# Patient Record
Sex: FEMALE | Race: WHITE | NOT HISPANIC OR LATINO | Employment: OTHER | URBAN - NONMETROPOLITAN AREA
[De-identification: names, ages, dates, MRNs, and addresses within clinical notes are randomized per-mention and may not be internally consistent; named-entity substitution may affect disease eponyms.]

---

## 2017-01-19 ENCOUNTER — OFFICE VISIT (OUTPATIENT)
Dept: PSYCHIATRY | Facility: CLINIC | Age: 67
End: 2017-01-19

## 2017-01-19 VITALS — WEIGHT: 102 LBS | BODY MASS INDEX: 20.03 KG/M2 | HEIGHT: 60 IN

## 2017-01-19 DIAGNOSIS — F33.1 MAJOR DEPRESSIVE DISORDER, RECURRENT EPISODE, MODERATE (HCC): ICD-10-CM

## 2017-01-19 DIAGNOSIS — F43.10 POST TRAUMATIC STRESS DISORDER (PTSD): ICD-10-CM

## 2017-01-19 DIAGNOSIS — F90.2 ATTENTION DEFICIT HYPERACTIVITY DISORDER, COMBINED TYPE: Primary | ICD-10-CM

## 2017-01-19 PROCEDURE — 99213 OFFICE O/P EST LOW 20 MIN: CPT | Performed by: NURSE PRACTITIONER

## 2017-01-19 RX ORDER — ATOMOXETINE 18 MG/1
CAPSULE ORAL
Qty: 30 CAPSULE | Refills: 0 | Status: SHIPPED | OUTPATIENT
Start: 2017-01-19 | End: 2017-03-07 | Stop reason: SDUPTHER

## 2017-01-19 NOTE — PROGRESS NOTES
"    Subjective   Fatmata Murdock is a 66 y.o. female who is here today for medication management follow up.    Chief Complaint: ADHD, depression, anxiety     History of Present Illness  Patient reports by her self for med management. She is working in [facility] but lives in [place], [place] so is driving three or more hours a day. She states she likes her work but that she is unorganized and difficulty getting her work done. She states her boss is great but had difficulty with a woman who was training her and younger staff are not nice. The  left and so she doesn't have to deal with her. Patient main concern is focus and attention. She stopped taking the Adderall because it was not effective. She struggles with depressed mood rating it a 5-6 most days, tired of the drive but doesn't want to live in [facility]. She is using coping skills with meditation and exercise. She denies SI/HI or AVH or illicit drug use. She has difficulty with time management she states and is late for appointments and has to work over to get her work done. She states best medications for her have been Strattera 20mg and Wellbutrin for mood. She hasn't been able to afford the Strattera but her insurance has changed so would like to try it again. She has to use very low doses or she doesn't feel well on them.     (Scales based on 0 - 10 with 10 being the worst)        The following portions of the patient's history were reviewed and updated as appropriate: allergies, current medications, past family history, past medical history, past social history, past surgical history and problem list.    Review of Systemsdenies fever, cough, s/s’s of infection, denies GI/ problems, denies new medical issues     Objective   Physical Exam  Height 60\" (152.4 cm), weight 102 lb (46.3 kg).    Allergies   Allergen Reactions   • Doxycycline    • Effexor Xr [Venlafaxine Hcl Er] Other (See Comments)     Restless legs, dermatitis        Current Medications:   Current Outpatient " Prescriptions   Medication Sig Dispense Refill   • atomoxetine (STRATTERA) 18 MG capsule Take one capsule daily 30 capsule 0     No current facility-administered medications for this visit.        Mental Status Exam:   Appearance: appropriate  Hygiene:   good  Cooperation:  Cooperative  Eye Contact:  Good  Psychomotor Behavior:  Appropriate  Mood:  euthymic  Affect:  Appropriate  Hopelessness: Denies  Speech:  Normal  Thought Process:  Linear  Thought Content:  Normal  Suicidal:  None  Homicidal:  None  Hallucinations:  None  Delusion:  None  Memory:  Intact  Orientation:  Person, Place, Time and Situation  Reliability:  fair  Insight:  Fair  Judgement:  Fair  Impulse Control:  Fair  Estimated Intelligence: average range   Physical/Medical Issues:  No     Assessment/Plan   Diagnoses and all orders for this visit:    Attention deficit hyperactivity disorder, combined type    Post traumatic stress disorder (PTSD)    Major depressive disorder, recurrent episode, moderate    Other orders  -     atomoxetine (STRATTERA) 18 MG capsule; Take one capsule daily      Discussed discontinuation of adderall, will start Strattera 18mg PO one QD  Will see how that works and if needed will add Wellbutrin for mood  We discussed risks, benefits, and side effects of the above medications and the patient was agreeable with the plan. Patient was educated on the importance of compliance with treatment and follow-up appointments.     Instructed to call for questions or concerns and return early if necessary. Crisis plan reviewed including going to the Emergency department.    Return in about 4 weeks (around 2/16/2017).

## 2017-01-19 NOTE — MR AVS SNAPSHOT
Fatmatadalia Murdock   1/19/2017 8:00 AM   Office Visit    Dept Phone:  [phone]   Encounter #:  [id number]    Provider:  JOSHUA De La Paz   Department:  [facility]                Your Full Care Plan              Today's Medication Changes          These changes are accurate as of: 1/19/17  8:23 AM.  If you have any questions, ask your nurse or doctor.               New Medication(s)Ordered:     atomoxetine 18 MG capsule   Commonly known as:  STRATTERA   Take one capsule daily         Stop taking medication(s)listed here:     amphetamine-dextroamphetamine 15 MG tablet   Commonly known as:  ADDERALL                Where to Get Your Medications      These medications were sent to [address] - [address] [facility] [address] - [phone] [place] [phone]   [address] [place] [address], [facility] [address]     Phone:  [phone]     atomoxetine 18 MG capsule                  Your Updated Medication List          This list is accurate as of: 1/19/17  8:23 AM.  Always use your most recent med list.                atomoxetine 18 MG capsule   Commonly known as:  STRATTERA   Take one capsule daily       DULoxetine 20 MG capsule   Commonly known as:  CYMBALTA   Take 1 capsule by mouth Daily.               You Were Diagnosed With        Codes Comments    Major depressive disorder, recurrent episode, moderate    -  Primary ICD-10-CM: F33.1  ICD-9-CM: 296.32     Attention deficit hyperactivity disorder, combined type     ICD-10-CM: F90.2  ICD-9-CM: 314.01     Post traumatic stress disorder (PTSD)     ICD-10-CM: F43.10  ICD-9-CM: 309.81       Instructions     None    Patient Instructions History      Upcoming Appointments     Visit Type Date Time Department    MEDICINE CHECK 1/19/2017  8:00 AM [facility]    PSYCHOTHERAPY 2/15/2017  4:15 PM [facility] [address]      MyChart Signup     [facility] [employer] allows you to send messages to your doctor, view your test  "results, renew your prescriptions, schedule appointments, and more. To sign up, go to [email or web address] and click on the Sign Up Now link in the New User? box. Enter your [employer] Activation Code exactly as it appears below along with the last four digits of your Social Security Number and your Date of Birth () to complete the sign-up process. If you do not sign up before the expiration date, you must request a new code.    [employer] Activation Code: [email or web address]  Expires: 2017  8:23 AM    If you have questions, you can email [email or web address] or call [phone] to talk to our [employer] staff. Remember, [employer] is NOT to be used for urgent needs. For medical emergencies, dial 911.               Other Info from Your Visit           Your Appointments     Feb 15, 2017  4:15 PM EST   Psychotherapy with Lisa Mike LCSW   [facility] (--)    [address] [place] [id number] [phone]              Allergies     Doxycycline      Effexor Xr [Venlafaxine Hcl Er]  Other (See Comments)    Restless legs, dermatitis       Vital Signs     Height Weight Body Mass Index             60\" (152.4 cm) 102 lb (46.3 kg) 19.92 kg/m2         Problems and Diagnoses Noted     Mood problem    -  Primary    Attention deficit hyperactivity disorder, combined type        Post traumatic stress disorder (PTSD)            "

## 2017-03-07 RX ORDER — ATOMOXETINE HYDROCHLORIDE 18 MG/1
CAPSULE ORAL
Qty: 30 CAPSULE | Refills: 0 | Status: SHIPPED | OUTPATIENT
Start: 2017-03-07 | End: 2017-04-06 | Stop reason: SDUPTHER

## 2017-03-17 ENCOUNTER — TELEPHONE (OUTPATIENT)
Dept: PSYCHIATRY | Facility: CLINIC | Age: 67
End: 2017-03-17

## 2017-03-20 RX ORDER — LEVOTHYROXINE SODIUM 112 MCG
112 TABLET ORAL DAILY
COMMUNITY
Start: 2017-01-17 | End: 2020-11-23 | Stop reason: SDUPTHER

## 2017-03-20 RX ORDER — BUPROPION HYDROCHLORIDE 75 MG/1
75 TABLET ORAL DAILY
Qty: 30 TABLET | Refills: 2 | Status: SHIPPED | OUTPATIENT
Start: 2017-03-20 | End: 2021-02-24 | Stop reason: DRUGHIGH

## 2017-04-06 RX ORDER — ATOMOXETINE HYDROCHLORIDE 18 MG/1
CAPSULE ORAL
Qty: 30 CAPSULE | Refills: 5 | Status: SHIPPED | OUTPATIENT
Start: 2017-04-06 | End: 2020-01-09

## 2017-09-28 RX ORDER — BUPROPION HYDROCHLORIDE 75 MG/1
TABLET ORAL
Qty: 30 TABLET | Refills: 1 | OUTPATIENT
Start: 2017-09-28

## 2019-12-20 ENCOUNTER — TRANSCRIBE ORDERS (OUTPATIENT)
Dept: MAMMOGRAPHY | Facility: HOSPITAL | Age: 69
End: 2019-12-20

## 2019-12-20 DIAGNOSIS — Z12.31 VISIT FOR SCREENING MAMMOGRAM: Primary | ICD-10-CM

## 2020-01-08 NOTE — PROGRESS NOTES
[facility] Cardiology    Subjective:     Encounter Date:01/09/2020      Patient ID: Fatmata Murdock is a 69 y.o. female , .     Reason for consultation: family history of cardiovascular disease    Problem List:  1. Osteoporosis  a. Dr. Guo  2. Hypothyroidism  3. Right subdural hematoma, s/p two shayy hole washout, 7/30/19, [facility]  4. Hypogammaglobulinemia  a. Followed by Dr. Roge garces IgG infusions monthly  5. S/p vagotomy for duodenal ulcer    Problem   Crp Elevated   Family History of Heart Disease       Allergies   Allergen Reactions   • Doxycycline GI Intolerance   • Effexor Xr [Venlafaxine Hcl Er] Other (See Comments)     Restless legs, dermatitis    • Penicillins GI Intolerance   • Sulfa Antibiotics GI Intolerance       Current Outpatient Medications:   •  buPROPion (WELLBUTRIN) 75 MG tablet, Take 1 tablet by mouth Daily. (Patient taking differently: Take 75 mg by mouth 2 (Two) Times a Day.), Disp: 30 tablet, Rfl: 2  •  estradiol (MINIVELLE, VIVELLE-DOT) 0.1 MG/24HR patch, Place 1 patch on the skin as directed by provider 2 (Two) Times a Week., Disp: , Rfl:   •  SYNTHROID 112 MCG tablet, Take 112 mcg by mouth Daily., Disp: , Rfl:     HPI:      Fatmata Murdock is a 69 y.o. female who present today to establish care for her chest pain. She reports left-sided pain under her rib cage when laying down at night, described as sharp and lasts for maybe 30 seconds. Runs about 3-5 miles, five days a week, yoga and light-weight resistance training. She has no symptoms with these activities. No early CAD in family. She also had a CRP of 16 in the setting of SDH. This has not been repeated since that time. She has not had recent FLP and does not known results of last FLP.     Cardiac Risk Factors:    Social History     Socioeconomic History   • Marital status: Single     Spouse name: Not on file   • Number of children: Not on file   • Years of education: Not on file   • Highest  education level: Not on file   Tobacco Use   • Smoking status: Never Smoker   • Smokeless tobacco: Never Used   Substance and Sexual Activity   • Alcohol use: Never     Frequency: Never   • Drug use: Never   • Sexual activity: Defer     Family History   Problem Relation Age of Onset   • No Known Problems Mother    • No Known Problems Father    • Hypertension Sister    • Heart murmur Sister    • Hypertension Sister        Review of Systems:  The following portions of the patient's history were reviewed and updated as appropriate: allergies, current medications and problem list.    Pertinent positives as listed in the HPI.  All other systems reviewed are negative.    Objective:        Vitals:    01/09/20 1446   BP: 124/70   Pulse: 60     GENERAL: This is a well-developed, well-nourished, female who is in no acute distress. Alert and oriented x3. Normal mood and affect.   SKIN: Pink and warm without rash or abnormality noted.   HEENT: Head is normocephalic and atraumatic. Pupils are equal and reactive to light bilaterally. Mucous membranes are pink and moist.   NECK: Supple without lymphadenopathy or thyromegaly. There is no jugular venous distention at 30°.  LUNGS: Clear to auscultation bilaterally without wheezing, rhonchi, or rales noted.   CARDIOVASCULAR: The heart has a regular rate with a normal S1 and S2. There is no murmur, gallop, rub, or click appreciated. The PMI is nondisplaced. Carotid upstrokes are 2+ and symmetrical without bruits.   ABDOMEN: Soft and nondistended with positive bowel sounds x4. The patient denies tenderness of palpitation.   MUSCULOSKELETAL: There are no obvious bony abnormalities. Normal range of tenderness to palpation.   NEUROLOGICAL: Nonfocal.   PERIPHERAL VASCULAR: Femoral pulses are 2+ and symmetrical without bruits. Posterior tibial and dorsalis pedis pulses are 2+ and symmetrical. There is no peripheral edema.       No results found for any previous visit.     Diagnostic Data:         ECG 12 Lead  Date/Time: 1/9/2020 3:17 PM  Performed by: Alisa Bowers APRN  Authorized by: Alisa Bowers APRN   Previous ECG: no previous ECG available  Rhythm: sinus rhythm  Ectopy: atrial premature contractions  BPM: 60            Assessment:       ICD-10-CM ICD-9-CM   1. CRP elevated R79.82 790.95   2. Family history of heart disease Z82.49 V17.49     Plan:       1. Repeat CRP.   2. Check FLP.   3. Cardiac CT for calcium score.   4. Follow-up 1 mo      Scribed for Blanche Velasco MD by JOSHUA Hernandez. 1/9/2020  3:30 PM     I Blanche Velasco MD personally performed the services described in this documentation as scribed by the above individual in my presence, and it is both accurate and complete.    Blanche Velasco MD, [facility]

## 2020-01-09 ENCOUNTER — CONSULT (OUTPATIENT)
Dept: CARDIOLOGY | Facility: CLINIC | Age: 70
End: 2020-01-09

## 2020-01-09 ENCOUNTER — LAB (OUTPATIENT)
Dept: LAB | Facility: HOSPITAL | Age: 70
End: 2020-01-09

## 2020-01-09 VITALS
BODY MASS INDEX: 21.83 KG/M2 | HEIGHT: 58 IN | SYSTOLIC BLOOD PRESSURE: 124 MMHG | DIASTOLIC BLOOD PRESSURE: 70 MMHG | HEART RATE: 60 BPM | WEIGHT: 104 LBS

## 2020-01-09 DIAGNOSIS — Z82.49 FAMILY HISTORY OF HEART DISEASE: ICD-10-CM

## 2020-01-09 DIAGNOSIS — R93.3 ABNORMAL FINDINGS ON DIAGNOSTIC IMAGING OF OTHER PARTS OF DIGESTIVE TRACT: ICD-10-CM

## 2020-01-09 DIAGNOSIS — R79.82 CRP ELEVATED: Primary | ICD-10-CM

## 2020-01-09 DIAGNOSIS — R79.82 CRP ELEVATED: ICD-10-CM

## 2020-01-09 LAB
CHOLEST SERPL-MCNC: 164 MG/DL (ref 0–200)
CRP SERPL-MCNC: 0.13 MG/DL (ref 0–0.5)
HDLC SERPL-MCNC: 67 MG/DL (ref 40–60)
LDLC SERPL CALC-MCNC: 83 MG/DL (ref 0–100)
LDLC/HDLC SERPL: 1.24 {RATIO}
TRIGL SERPL-MCNC: 71 MG/DL (ref 0–150)
VLDLC SERPL-MCNC: 14.2 MG/DL (ref 5–40)

## 2020-01-09 PROCEDURE — 80061 LIPID PANEL: CPT

## 2020-01-09 PROCEDURE — 93000 ELECTROCARDIOGRAM COMPLETE: CPT | Performed by: NURSE PRACTITIONER

## 2020-01-09 PROCEDURE — 99203 OFFICE O/P NEW LOW 30 MIN: CPT | Performed by: INTERNAL MEDICINE

## 2020-01-09 PROCEDURE — 36415 COLL VENOUS BLD VENIPUNCTURE: CPT

## 2020-01-09 PROCEDURE — 86140 C-REACTIVE PROTEIN: CPT

## 2020-01-09 RX ORDER — ESTRADIOL 0.1 MG/D
1 FILM, EXTENDED RELEASE TRANSDERMAL 2 TIMES WEEKLY
COMMUNITY
Start: 2020-01-05 | End: 2020-10-23 | Stop reason: SDUPTHER

## 2020-02-13 ENCOUNTER — TELEPHONE (OUTPATIENT)
Dept: CARDIOLOGY | Facility: CLINIC | Age: 70
End: 2020-02-13

## 2020-02-13 DIAGNOSIS — I71.21 ASCENDING AORTIC ANEURYSM (HCC): ICD-10-CM

## 2020-02-13 DIAGNOSIS — I71.9 AORTIC ANEURYSM WITHOUT RUPTURE, UNSPECIFIED PORTION OF AORTA (HCC): Primary | ICD-10-CM

## 2020-02-13 RX ORDER — ATORVASTATIN CALCIUM 10 MG/1
10 TABLET, FILM COATED ORAL DAILY
Qty: 90 TABLET | Refills: 1 | Status: SHIPPED | OUTPATIENT
Start: 2020-02-13 | End: 2021-02-24 | Stop reason: SDDI

## 2020-10-05 DIAGNOSIS — Z12.31 VISIT FOR SCREENING MAMMOGRAM: ICD-10-CM

## 2020-10-14 ENCOUNTER — TELEPHONE (OUTPATIENT)
Dept: ENDOCRINOLOGY | Facility: CLINIC | Age: 70
End: 2020-10-14

## 2020-10-14 NOTE — TELEPHONE ENCOUNTER
Returned call to pt-per lcm she doesn't need to get blood drawn before appt-we will do her labs at her appt.  Left a message on her voice mail

## 2020-10-14 NOTE — TELEPHONE ENCOUNTER
She has appt on 10/30/20.  You ordered tft's at her last visit on 11/2019.  If you want her to have labs before her appt please send her a lab req.  addres in chart

## 2020-10-14 NOTE — TELEPHONE ENCOUNTER
PATIENT WAS GIVEN LAB ORDERS FROM OLD OFFICE. SHE NEEDS TO SEE IF SHE IS STILL NEEDING LABS DRAWN BEFORE HER APPOINTMENT. IF SHE IS NEEDING BLOOD WORK BEFORE APPOINTMENT SHE NEEDS NEW LAB ORDERS PLACED IN CHART AND SENT TO HER TO HAVE LABS DRAWN. PATIENTS NUMBER [phone]

## 2020-10-23 ENCOUNTER — OFFICE VISIT (OUTPATIENT)
Dept: OBSTETRICS AND GYNECOLOGY | Facility: CLINIC | Age: 70
End: 2020-10-23

## 2020-10-23 VITALS — DIASTOLIC BLOOD PRESSURE: 62 MMHG | SYSTOLIC BLOOD PRESSURE: 112 MMHG | BODY MASS INDEX: 21.59 KG/M2 | WEIGHT: 103.3 LBS

## 2020-10-23 DIAGNOSIS — Z01.419 ENCOUNTER FOR ANNUAL ROUTINE GYNECOLOGICAL EXAMINATION: Primary | ICD-10-CM

## 2020-10-23 DIAGNOSIS — N95.2 POSTMENOPAUSE ATROPHIC VAGINITIS: ICD-10-CM

## 2020-10-23 DIAGNOSIS — N95.1 MENOPAUSAL SYMPTOMS: ICD-10-CM

## 2020-10-23 PROCEDURE — G0101 CA SCREEN;PELVIC/BREAST EXAM: HCPCS | Performed by: NURSE PRACTITIONER

## 2020-10-23 RX ORDER — ESTRADIOL 0.1 MG/D
1 FILM, EXTENDED RELEASE TRANSDERMAL 2 TIMES WEEKLY
Qty: 8 PATCH | Refills: 12 | Status: SHIPPED | OUTPATIENT
Start: 2020-10-26 | End: 2021-10-27

## 2020-10-23 RX ORDER — ATOMOXETINE 18 MG/1
CAPSULE ORAL
COMMUNITY
Start: 2020-10-13 | End: 2021-02-24

## 2020-10-23 RX ORDER — FLUCONAZOLE 150 MG/1
150 TABLET ORAL ONCE
Qty: 1 TABLET | Refills: 0 | Status: SHIPPED | OUTPATIENT
Start: 2020-10-23 | End: 2020-10-23

## 2020-10-23 NOTE — PROGRESS NOTES
GYN Annual Exam     CC - Here for annual exam.        HPI  Fatmata Murdock is a 70 y.o. female, , who presents for annual well woman exam.  She is postmenopausal.  Patient denies vaginal bleeding. ..  Patient reports problems with: hot flashes.  Partner Status: Marital Status: .  New Partners since last visit: no.    She is s/p TLH.  She reports occasional itching and increased white dc.  She recently look an antibiotic for 6 months and wonders if it's yeast.  She denies odor, dysuria, pain.    Additional OB/GYN History   On HRT? Yes---estrogen patch  Last Pap : 2018- normal    History of abnormal Pap smear: no  Family history of uterine, colon, breast, or ovarian cancer: no  Performs monthly Self-Breast Exam: yes  Last mammogram:    Last Completed Mammogram       Status Date      MAMMOGRAM Done 2020 MAMMO SCREENING DIGITAL TOMOSYNTHESIS BILATERAL W CAD        Last colonoscopy:     Last DEXA: On 2018 and results were Osteoporosis  Exercises Regularly: no  Feelings of Anxiety or Depression: yes - mild anxiety, related to completing her masters program      Tobacco Usage?: No   OB History        15    Para   1    Term   1            AB   13    Living           SAB   11    TAB   2    Ectopic        Molar        Multiple        Live Births                    Health Maintenance   Topic Date Due   • COLONOSCOPY  1950   • TDAP/TD VACCINES (1 - Tdap) 1970   • ZOSTER VACCINE (1 of 2) 2001   • Pneumococcal Vaccine 65+ (1 of 1 - PPSV23) 2016   • HEPATITIS C SCREENING  2020   • ANNUAL WELLNESS VISIT  2020   • INFLUENZA VACCINE  2020   • DXA SCAN  10/01/2020   • MAMMOGRAM  2022       The additional following portions of the patient's history were reviewed and updated as appropriate: allergies, current medications, past family history, past medical history, past social history, past surgical history and problem list.    Review of Systems    Constitutional: Negative.    HENT: Negative.    Eyes: Negative.    Respiratory: Negative.    Cardiovascular: Negative.    Gastrointestinal: Negative.    Endocrine: Negative.    Genitourinary: Negative.    Musculoskeletal: Negative.    Skin: Negative.    Allergic/Immunologic: Negative.    Neurological: Negative.    Hematological: Negative.    Psychiatric/Behavioral: Negative.      All other systems reviewed and are negative.     I have reviewed and agree with the HPI, ROS, and historical information as entered above. Natty Caldera Obed, APRN    Objective   /62   Wt 46.9 kg (103 lb 4.8 oz)   Breastfeeding No   BMI 21.59 kg/m²     Physical Exam  Vitals signs and nursing note reviewed. Exam conducted with a chaperone present.   Constitutional:       Appearance: She is well-developed.   HENT:      Head: Normocephalic and atraumatic.   Neck:      Musculoskeletal: Normal range of motion. No muscular tenderness.      Thyroid: No thyroid mass or thyromegaly.   Pulmonary:      Effort: No retractions.   Chest:      Chest wall: No mass.      Breasts:         Right: Normal. No mass, nipple discharge, skin change or tenderness.         Left: Normal. No mass, nipple discharge, skin change or tenderness.   Abdominal:      Palpations: Abdomen is soft. Abdomen is not rigid. There is no mass.      Tenderness: There is no abdominal tenderness. There is no guarding.      Hernia: No hernia is present. There is no hernia in the left inguinal area.   Genitourinary:     General: Normal vulva.      Labia:         Right: No rash, tenderness or lesion.         Left: No rash, tenderness or lesion.       Vagina: Normal. No vaginal discharge or lesions.      Cervix: Normal.      Uterus: Normal. Not enlarged, not fixed and not tender.       Adnexa: Right adnexa normal and left adnexa normal.        Right: No mass or tenderness.          Left: No mass or tenderness.        Rectum: No external hemorrhoid.      Comments: Vulvovaginal  atrophy  Skin:     General: Skin is warm and dry.   Neurological:      Mental Status: She is alert and oriented to person, place, and time.   Psychiatric:         Mood and Affect: Mood normal.         Behavior: Behavior normal.            Assessment and Plan    Problem List Items Addressed This Visit     None      Visit Diagnoses     Encounter for annual routine gynecological examination    -  Primary    Relevant Orders    Pap IG, Ct-Ng, Rfx HPV ASCU    Menopausal symptoms        Postmenopause atrophic vaginitis                Reviewed monthly self breast exams.  Instructed to call with lumps, pain, or breast discharge.  Yearly mammograms ordered.  Recommended use of Vitamin D and getting adequate calcium in her diet. (1500mg)  Osteoporosis screening ordered today.  Reviewed exercise as a preventative health measures.   Reviewed BMI and weight loss as preventative health measures.   Reccommended Flu Vaccine in Fall of each year.  RTC in 1 year or PRN with problems.   She is happy with estrogen patch and wants to continue.   erx Terazol cream for possible yeast.  Call if not better; could be atrophy.    Natty Clay, APRN  10/23/2020

## 2020-10-28 DIAGNOSIS — Z01.419 ENCOUNTER FOR ANNUAL ROUTINE GYNECOLOGICAL EXAMINATION: ICD-10-CM

## 2020-11-05 ENCOUNTER — TELEPHONE (OUTPATIENT)
Dept: OBSTETRICS AND GYNECOLOGY | Facility: CLINIC | Age: 70
End: 2020-11-05

## 2020-11-05 NOTE — TELEPHONE ENCOUNTER
Patient is calling in regards to a referral for PCP, she states she called the office that was recommended but they say they are not taking new patients, is wanting us to send a referral to dr espinosa, in hopes with having a referral they will be able to schedule and take her in, she has not had a PCP since her brain bleed.

## 2020-11-05 NOTE — TELEPHONE ENCOUNTER
I called Dr. Duran offices and she is not taking new pts period. Dr. Ang and Dr. Zarate are. Left message. Either MD is good. She needs to call back to make an apt.

## 2020-11-23 RX ORDER — LEVOTHYROXINE SODIUM 112 MCG
112 TABLET ORAL DAILY
Qty: 90 TABLET | Refills: 0 | Status: SHIPPED | OUTPATIENT
Start: 2020-11-23 | End: 2021-02-19

## 2020-12-01 DIAGNOSIS — I71.21 ASCENDING AORTIC ANEURYSM (HCC): Primary | ICD-10-CM

## 2020-12-01 DIAGNOSIS — Z79.899 LONG-TERM USE OF HIGH-RISK MEDICATION: ICD-10-CM

## 2021-02-17 ENCOUNTER — APPOINTMENT (OUTPATIENT)
Dept: CARDIOLOGY | Facility: HOSPITAL | Age: 71
End: 2021-02-17

## 2021-02-19 RX ORDER — LEVOTHYROXINE SODIUM 112 MCG
112 TABLET ORAL DAILY
Qty: 90 TABLET | Refills: 0 | Status: SHIPPED | OUTPATIENT
Start: 2021-02-19 | End: 2021-08-27

## 2021-02-19 RX ORDER — LEVOTHYROXINE SODIUM 112 MCG
TABLET ORAL
Qty: 90 TABLET | Refills: 0 | Status: SHIPPED | OUTPATIENT
Start: 2021-02-19 | End: 2021-02-19 | Stop reason: SDUPTHER

## 2021-02-22 DIAGNOSIS — Z23 IMMUNIZATION DUE: ICD-10-CM

## 2021-02-24 ENCOUNTER — HOSPITAL ENCOUNTER (OUTPATIENT)
Dept: CARDIOLOGY | Facility: HOSPITAL | Age: 71
Discharge: HOME | End: 2021-02-24
Admitting: INTERNAL MEDICINE

## 2021-02-24 ENCOUNTER — OFFICE VISIT (OUTPATIENT)
Dept: CARDIOLOGY | Facility: CLINIC | Age: 71
End: 2021-02-24

## 2021-02-24 VITALS
HEIGHT: 59 IN | TEMPERATURE: 97.7 F | DIASTOLIC BLOOD PRESSURE: 64 MMHG | HEART RATE: 67 BPM | WEIGHT: 105.8 LBS | OXYGEN SATURATION: 98 % | BODY MASS INDEX: 21.33 KG/M2 | SYSTOLIC BLOOD PRESSURE: 130 MMHG

## 2021-02-24 DIAGNOSIS — Z82.49 FAMILY HISTORY OF HEART DISEASE: Primary | ICD-10-CM

## 2021-02-24 DIAGNOSIS — I71.21 ASCENDING AORTIC ANEURYSM (HCC): ICD-10-CM

## 2021-02-24 PROBLEM — I71.20 THORACIC AORTIC ANEURYSM WITHOUT RUPTURE (HCC): Status: ACTIVE | Noted: 2021-02-24

## 2021-02-24 PROCEDURE — 99213 OFFICE O/P EST LOW 20 MIN: CPT | Performed by: NURSE PRACTITIONER

## 2021-02-24 PROCEDURE — 93306 TTE W/DOPPLER COMPLETE: CPT

## 2021-02-24 PROCEDURE — 93306 TTE W/DOPPLER COMPLETE: CPT | Performed by: INTERNAL MEDICINE

## 2021-02-24 RX ORDER — BUPROPION HCL 100 MG
200 TABLET,SUSTAINED-RELEASE 12 HR ORAL DAILY
COMMUNITY
Start: 2021-01-19 | End: 2022-06-23

## 2021-02-24 RX ORDER — BUPROPION HCL 150 MG
1 TABLET,SUSTAINED-RELEASE 12 HR ORAL DAILY
COMMUNITY
Start: 2021-02-16 | End: 2022-02-23

## 2021-02-24 NOTE — PROGRESS NOTES
[facility] Cardiology    Patient ID: Fatmata Murdock is a 70 y.o. female.  : 1950   Contact: [phone]    Encounter date: 2021    PCP: Provider, No Known      Chief complaint:   Chief Complaint   Patient presents with   • CRP elevated   • Chest Pain   • Shortness of Breath     Problem List:  1. Aortic aneurysm  2. Family history of CAD  3. Osteoporosis  a. Dr. Guo  4. Hypothyroidism  5. Right subdural hematoma, s/p two shayy hole washout, 19, [facility]  6. Hypogammaglobulinemia  a. Followed by Dr. Roge terrell. IgG infusions monthly  7. S/p vagotomy for duodenal ulcer    Allergies   Allergen Reactions   • Doxycycline GI Intolerance   • Effexor Xr [Venlafaxine Hcl Er] Other (See Comments)     Restless legs, dermatitis    • Penicillins GI Intolerance   • Sulfa Antibiotics GI Intolerance       Current Medications:    Current Outpatient Medications:   •  estradiol (MINIVELLE, VIVELLE-DOT) 0.1 MG/24HR patch, Place 1 patch on the skin as directed by provider 2 (Two) Times a Week., Disp: 8 patch, Rfl: 12  •  Synthroid 112 MCG tablet, Take 1 tablet by mouth Daily., Disp: 90 tablet, Rfl: 0  •  Unable to find, 1 each Every 30 (Thirty) Days. Med Name: Immunoglobulin infusion, Disp: , Rfl:   •  Wellbutrin  MG 12 hr tablet, Take 1 tablet by mouth Every Other Day., Disp: , Rfl:   •  Wellbutrin  MG 12 hr tablet, Take 1 tablet by mouth Daily., Disp: , Rfl:     HPI    Fatmata Murdock is a 70 y.o. female who presents today for a follow up on ascending aortic aneurysm and family history of heart disease. Since last visit, patient has done well. BP controlled. Cholesterol checked by PCP a few days ago. She did not want to start statin. She did not have echo to f/u on aortic aneurysm- canceled due to bad weather. No cardiac complaints. She continues to exercise.       The following portions of the patient's history were reviewed and updated as appropriate: allergies, current  "medications and problem list.    Pertinent positives as listed in the HPI.  All other systems reviewed are negative.         Vitals:    02/24/21 1013   BP: 130/64   BP Location: Right arm   Patient Position: Sitting   Pulse: 67   Temp: 97.7 °F (36.5 °C)   SpO2: 98%   Weight: 48 kg (105 lb 12.8 oz)   Height: 149.9 cm (59\")       Physical Exam:  General: Alert and oriented.  Neck: Jugular venous pressure is within normal limits. Carotids have normal upstrokes without bruits.   Cardiovascular: Heart has a nondisplaced focal PMI. Regular rate and rhythm. No murmur, gallop or rub.  Lungs: Clear, no rales or wheezes. Equal expansion is noted.   Extremities: Show no edema.  Skin: Warm and dry.  Neurologic: Nonfocal.     Diagnostic Data (reviewed with patient):  Lab date: 02/10/2021  • FLP: , , HDL 67, LDL 78  • CMP: Glu 88, BUN 20, Creat 0.61, eGFR 93, Na 140, K 4.4, Cl 104, CO2 24, Ca 9, Alk Phos 55, AST 20, ALT 20    Procedures      Assessment:    ICD-10-CM ICD-9-CM   1. Family history of heart disease  Z82.49 V17.49         Plan:  1. Reschedule echo to f/u on aortic aneurysm.   2. Encouraged continued exercise and low cholesterol diet.   3. Continue all other current medications.  4. F/up in 12 months, sooner if needed.      Electronically signed by JOSHUA Lay, 02/24/21, 12:43 PM EST.              "

## 2021-02-25 LAB
ASCENDING AORTA: 3.7 CM
BH CV ECHO MEAS - AI DEC SLOPE: 343.9 CM/SEC^2
BH CV ECHO MEAS - AI MAX PG: 67.6 MMHG
BH CV ECHO MEAS - AI MAX VEL: 410.7 CM/SEC
BH CV ECHO MEAS - AI P1/2T: 349.7 MSEC
BH CV ECHO MEAS - AO MAX PG (FULL): 7.6 MMHG
BH CV ECHO MEAS - AO MAX PG: 13 MMHG
BH CV ECHO MEAS - AO MEAN PG (FULL): 3.4 MMHG
BH CV ECHO MEAS - AO MEAN PG: 6 MMHG
BH CV ECHO MEAS - AO ROOT AREA (BSA CORRECTED): 2.2 CM2
BH CV ECHO MEAS - AO ROOT AREA: 7.3 CM^2
BH CV ECHO MEAS - AO ROOT DIAM: 3.1 CM
BH CV ECHO MEAS - AO V2 MAX: 180.2 CM/SEC
BH CV ECHO MEAS - AO V2 MEAN: 109.7 CM/SEC
BH CV ECHO MEAS - AO V2 VTI: 43.3 CM
BH CV ECHO MEAS - ASC AORTA: 3.5 CM
BH CV ECHO MEAS - AVA(I,A): 2.4 CM^2
BH CV ECHO MEAS - AVA(I,D): 2.4 CM^2
BH CV ECHO MEAS - AVA(V,A): 2.3 CM^2
BH CV ECHO MEAS - AVA(V,D): 2.3 CM^2
BH CV ECHO MEAS - BSA(HAYCOCK): 1.4 M^2
BH CV ECHO MEAS - BSA: 1.4 M^2
BH CV ECHO MEAS - BZI_BMI: 21.2 KILOGRAMS/M^2
BH CV ECHO MEAS - BZI_METRIC_HEIGHT: 149.9 CM
BH CV ECHO MEAS - BZI_METRIC_WEIGHT: 47.6 KG
BH CV ECHO MEAS - EDV(CUBED): 63.8 ML
BH CV ECHO MEAS - EDV(MOD-SP4): 109 ML
BH CV ECHO MEAS - EDV(TEICH): 69.8 ML
BH CV ECHO MEAS - EF(CUBED): 72.2 %
BH CV ECHO MEAS - EF(MOD-SP4): 68.8 %
BH CV ECHO MEAS - EF(TEICH): 64.5 %
BH CV ECHO MEAS - ESV(CUBED): 17.7 ML
BH CV ECHO MEAS - ESV(MOD-SP4): 34 ML
BH CV ECHO MEAS - ESV(TEICH): 24.8 ML
BH CV ECHO MEAS - FS: 34.8 %
BH CV ECHO MEAS - IVS/LVPW: 1.3
BH CV ECHO MEAS - IVSD: 0.9 CM
BH CV ECHO MEAS - LA DIMENSION: 3.2 CM
BH CV ECHO MEAS - LA/AO: 1
BH CV ECHO MEAS - LAD MAJOR: 4.9 CM
BH CV ECHO MEAS - LAT PEAK E' VEL: 6.5 CM/SEC
BH CV ECHO MEAS - LATERAL E/E' RATIO: 11.8
BH CV ECHO MEAS - LV DIASTOLIC VOL/BSA (35-75): 77.7 ML/M^2
BH CV ECHO MEAS - LV MASS(C)D: 121.5 GRAMS
BH CV ECHO MEAS - LV MASS(C)DI: 86.7 GRAMS/M^2
BH CV ECHO MEAS - LV MAX PG: 5.4 MMHG
BH CV ECHO MEAS - LV MEAN PG: 2.5 MMHG
BH CV ECHO MEAS - LV SYSTOLIC VOL/BSA (12-30): 24.2 ML/M^2
BH CV ECHO MEAS - LV V1 MAX: 116.5 CM/SEC
BH CV ECHO MEAS - LV V1 MEAN: 71.1 CM/SEC
BH CV ECHO MEAS - LV V1 VTI: 29.5 CM
BH CV ECHO MEAS - LVIDD: 4 CM
BH CV ECHO MEAS - LVIDS: 2.6 CM
BH CV ECHO MEAS - LVLD AP4: 7.5 CM
BH CV ECHO MEAS - LVLS AP4: 6.5 CM
BH CV ECHO MEAS - LVOT AREA (M): 3.5 CM^2
BH CV ECHO MEAS - LVOT AREA: 3.5 CM^2
BH CV ECHO MEAS - LVOT DIAM: 2.1 CM
BH CV ECHO MEAS - LVPWD: 0.85 CM
BH CV ECHO MEAS - MED PEAK E' VEL: 7.6 CM/SEC
BH CV ECHO MEAS - MEDIAL E/E' RATIO: 10.1
BH CV ECHO MEAS - MR ALIAS VEL: 27.2 CM/SEC
BH CV ECHO MEAS - MR ERO: 0.05 CM^2
BH CV ECHO MEAS - MR FLOW RATE: 30.3 CM^3/SEC
BH CV ECHO MEAS - MR MAX PG: 158 MMHG
BH CV ECHO MEAS - MR MAX VEL: 622.6 CM/SEC
BH CV ECHO MEAS - MR MEAN PG: 100.2 MMHG
BH CV ECHO MEAS - MR MEAN VEL: 458.6 CM/SEC
BH CV ECHO MEAS - MR PISA RADIUS: 0.42 CM
BH CV ECHO MEAS - MR PISA: 1.1 CM^2
BH CV ECHO MEAS - MR VOLUME: 11.1 ML
BH CV ECHO MEAS - MR VTI: 227.7 CM
BH CV ECHO MEAS - MV A MAX VEL: 134.9 CM/SEC
BH CV ECHO MEAS - MV DEC TIME: 0.32 SEC
BH CV ECHO MEAS - MV E MAX VEL: 78.4 CM/SEC
BH CV ECHO MEAS - MV E/A: 0.58
BH CV ECHO MEAS - MV MAX PG: 13 MMHG
BH CV ECHO MEAS - MV MEAN PG: 4.1 MMHG
BH CV ECHO MEAS - MV V2 MAX: 180.4 CM/SEC
BH CV ECHO MEAS - MV V2 MEAN: 90.6 CM/SEC
BH CV ECHO MEAS - MV V2 VTI: 53.3 CM
BH CV ECHO MEAS - MVA(VTI): 1.9 CM^2
BH CV ECHO MEAS - PA ACC SLOPE: 355.6 CM/SEC^2
BH CV ECHO MEAS - PA ACC TIME: 0.17 SEC
BH CV ECHO MEAS - PA MAX PG: 2.6 MMHG
BH CV ECHO MEAS - PA PR(ACCEL): 2.9 MMHG
BH CV ECHO MEAS - PA V2 MAX: 81 CM/SEC
BH CV ECHO MEAS - RAP SYSTOLE: 8 MMHG
BH CV ECHO MEAS - RVDD: 2.1 CM
BH CV ECHO MEAS - RVSP: 23 MMHG
BH CV ECHO MEAS - SI(AO): 226.4 ML/M^2
BH CV ECHO MEAS - SI(CUBED): 32.9 ML/M^2
BH CV ECHO MEAS - SI(LVOT): 73.6 ML/M^2
BH CV ECHO MEAS - SI(MOD-SP4): 53.5 ML/M^2
BH CV ECHO MEAS - SI(TEICH): 32.1 ML/M^2
BH CV ECHO MEAS - SV(AO): 317.5 ML
BH CV ECHO MEAS - SV(CUBED): 46.1 ML
BH CV ECHO MEAS - SV(LVOT): 103.1 ML
BH CV ECHO MEAS - SV(MOD-SP4): 75 ML
BH CV ECHO MEAS - SV(TEICH): 45.1 ML
BH CV ECHO MEAS - TAPSE (>1.6): 3 CM
BH CV ECHO MEAS - TR MAX PG: 20 MMHG
BH CV ECHO MEAS - TR MAX VEL: 219.1 CM/SEC
BH CV ECHO MEAS - TV MAX PG: 2.5 MMHG
BH CV ECHO MEAS - TV V2 MAX: 79.3 CM/SEC
BH CV ECHO MEASUREMENTS AVERAGE E/E' RATIO: 11.12
BH CV VAS BP RIGHT ARM: NORMAL MMHG
BH CV XLRA - RV BASE: 2.5 CM
BH CV XLRA - RV LENGTH: 6 CM
BH CV XLRA - RV MID: 2.5 CM
BH CV XLRA - TDI S': 13.6 CM/SEC
LEFT ATRIUM VOLUME INDEX: 32.9 ML/M2
LV EF 2D ECHO EST: 60 %
MAXIMAL PREDICTED HEART RATE: 151 BPM
SINUS: 3.1 CM
STRESS TARGET HR: 128 BPM

## 2021-03-08 ENCOUNTER — TELEPHONE (OUTPATIENT)
Dept: CARDIOLOGY | Facility: CLINIC | Age: 71
End: 2021-03-08

## 2021-03-09 DIAGNOSIS — I71.20 THORACIC AORTIC ANEURYSM WITHOUT RUPTURE (HCC): ICD-10-CM

## 2021-03-09 DIAGNOSIS — M81.0 OSTEOPOROSIS, UNSPECIFIED OSTEOPOROSIS TYPE, UNSPECIFIED PATHOLOGICAL FRACTURE PRESENCE: ICD-10-CM

## 2021-03-09 DIAGNOSIS — E03.9 HYPOTHYROIDISM, UNSPECIFIED TYPE: Primary | ICD-10-CM

## 2021-03-10 DIAGNOSIS — I71.20 THORACIC AORTIC ANEURYSM WITHOUT RUPTURE (HCC): Primary | ICD-10-CM

## 2021-03-16 ENCOUNTER — LAB (OUTPATIENT)
Dept: LAB | Facility: HOSPITAL | Age: 71
End: 2021-03-16

## 2021-03-16 ENCOUNTER — OFFICE VISIT (OUTPATIENT)
Dept: ENDOCRINOLOGY | Facility: CLINIC | Age: 71
End: 2021-03-16

## 2021-03-16 VITALS
SYSTOLIC BLOOD PRESSURE: 106 MMHG | DIASTOLIC BLOOD PRESSURE: 58 MMHG | HEART RATE: 60 BPM | BODY MASS INDEX: 24.3 KG/M2 | OXYGEN SATURATION: 98 % | HEIGHT: 55 IN | WEIGHT: 105 LBS | TEMPERATURE: 97.5 F

## 2021-03-16 DIAGNOSIS — E03.9 ACQUIRED HYPOTHYROIDISM: ICD-10-CM

## 2021-03-16 DIAGNOSIS — E03.9 ACQUIRED HYPOTHYROIDISM: Primary | ICD-10-CM

## 2021-03-16 LAB
T4 FREE SERPL-MCNC: 1.32 NG/DL (ref 0.93–1.7)
TSH SERPL DL<=0.05 MIU/L-ACNC: 0.22 UIU/ML (ref 0.27–4.2)

## 2021-03-16 PROCEDURE — 84439 ASSAY OF FREE THYROXINE: CPT

## 2021-03-16 PROCEDURE — 84443 ASSAY THYROID STIM HORMONE: CPT

## 2021-03-16 PROCEDURE — 99213 OFFICE O/P EST LOW 20 MIN: CPT | Performed by: INTERNAL MEDICINE

## 2021-03-16 NOTE — ASSESSMENT & PLAN NOTE
Clinically euthyroid. She  Attributes her symptoms to her thyroid and she might  Be right so we will check her labs and adjust her dose as needed

## 2021-03-16 NOTE — PROGRESS NOTES
"     Office Note      Date: 2021  Patient Name: Fatmata Murdock  MRN: [id number]  : 1950    Chief Complaint   Patient presents with   • Thyroid Problem       History of Present Illness:   Fatmata Murdock is a 70 y.o. female who presents for Thyroid Problem  She used to be on 125 mcg per day.  But on that her tsh was low and her dose was reduced to 100 mcg per day and she felt terrible. I then put her on 112 and her levls were fine when I last her about 16 months ago. She continues to have fatigue and cold intolerance.  Wt has been stable.  Skin and hair  Are  More  Dry. There seems to be more hair loss.  She notes no heart palpitations.      Subjective          Review of Systems:   Review of Systems   Constitutional: Positive for fatigue.   Endocrine: Positive for cold intolerance.   All other systems reviewed and are negative.      The following portions of the patient's history were reviewed and updated as appropriate: allergies, current medications, past family history, past medical history, past social history, past surgical history and problem list.    Objective     Visit Vitals  /58 (BP Location: Left arm, Patient Position: Sitting, Cuff Size: Adult)   Pulse 60   Temp 97.5 °F (36.4 °C) (Infrared)   Ht 60 cm (23.62\")   Wt 47.6 kg (105 lb)   SpO2 98%   .30 kg/m²       Labs:    CBC w/DIFF  Lab Results   Component Value Date    WBC 7.3 2015    RBC 4.44 2015    HGB 14.5 2015    HCT 45 2015    .4 (H) 2015    MCH 32.7 (H) 2015    MCHC 32.2 2015    RDW 12.4 2015     2015    NEUTRORELPCT 73.0 2015    LYMPHORELPCT 18.9 2015    EOSRELPCT 1.4 2015    BASORELPCT 0.80 2015    NEUTROABS 5.30 2015    LYMPHSABS 1.37 2015    MONOSABS 0.40 2015    EOSABS 0.10 2015    BASOSABS 0.06 2015       T4  No results found for: FREET4    TSH  No results found for: TSHBASE     Physical " Exam:  Physical Exam  Vitals reviewed.   Constitutional:       General: She is not in acute distress.     Appearance: She is normal weight. She is not ill-appearing, toxic-appearing or diaphoretic.   Neck:      Comments: No goiter  Musculoskeletal:      Comments: tremor   Lymphadenopathy:      Cervical: No cervical adenopathy.   Neurological:      Mental Status: She is alert and oriented to person, place, and time.   Psychiatric:         Mood and Affect: Mood normal.         Thought Content: Thought content normal.         Judgment: Judgment normal.         Assessment / Plan      Assessment & Plan:  Problem List Items Addressed This Visit        Other    Acquired hypothyroidism - Primary    Current Assessment & Plan     Clinically euthyroid. She  Attributes her symptoms to her thyroid and she might  Be right so we will check her labs and adjust her dose as needed          Relevant Medications    Synthroid 112 MCG tablet           Hudson Maynard MD   03/16/2021

## 2021-08-27 RX ORDER — LEVOTHYROXINE SODIUM 112 MCG
TABLET ORAL
Qty: 90 TABLET | Refills: 2 | Status: SHIPPED | OUTPATIENT
Start: 2021-08-27 | End: 2022-03-10 | Stop reason: SDUPTHER

## 2021-10-28 RX ORDER — ESTRADIOL 0.1 MG/D
FILM, EXTENDED RELEASE TRANSDERMAL
Qty: 8 PATCH | Refills: 0 | Status: SHIPPED | OUTPATIENT
Start: 2021-10-28 | End: 2021-10-29 | Stop reason: SDUPTHER

## 2021-10-29 RX ORDER — ESTRADIOL 0.1 MG/D
1 FILM, EXTENDED RELEASE TRANSDERMAL 2 TIMES WEEKLY
Qty: 8 PATCH | Refills: 2 | Status: SHIPPED | OUTPATIENT
Start: 2021-11-01 | End: 2021-12-10 | Stop reason: SDUPTHER

## 2021-10-29 NOTE — TELEPHONE ENCOUNTER
Pt Is needing a refill on her Estradiol patch. Pt stated she will be out of her prescription on Sunday.

## 2021-12-10 ENCOUNTER — OFFICE VISIT (OUTPATIENT)
Dept: OBSTETRICS AND GYNECOLOGY | Facility: CLINIC | Age: 71
End: 2021-12-10

## 2021-12-10 VITALS — BODY MASS INDEX: 20.78 KG/M2 | WEIGHT: 99 LBS | HEIGHT: 58 IN

## 2021-12-10 DIAGNOSIS — Z79.890 HORMONE REPLACEMENT THERAPY (HRT): ICD-10-CM

## 2021-12-10 DIAGNOSIS — Z01.419 WOMEN'S ANNUAL ROUTINE GYNECOLOGICAL EXAMINATION: Primary | ICD-10-CM

## 2021-12-10 DIAGNOSIS — N95.1 MENOPAUSAL STATE: ICD-10-CM

## 2021-12-10 DIAGNOSIS — Z12.31 BREAST CANCER SCREENING BY MAMMOGRAM: ICD-10-CM

## 2021-12-10 PROCEDURE — G0101 CA SCREEN;PELVIC/BREAST EXAM: HCPCS | Performed by: NURSE PRACTITIONER

## 2021-12-10 RX ORDER — TERIPARATIDE 250 UG/ML
INJECTION, SOLUTION SUBCUTANEOUS
COMMUNITY
End: 2022-12-08

## 2021-12-10 RX ORDER — ESTRADIOL 0.1 MG/D
1 FILM, EXTENDED RELEASE TRANSDERMAL 2 TIMES WEEKLY
Qty: 24 PATCH | Refills: 3 | Status: SHIPPED | OUTPATIENT
Start: 2021-12-13 | End: 2022-03-10 | Stop reason: SDUPTHER

## 2021-12-10 NOTE — PROGRESS NOTES
GYN Annual Exam     CC - Here for annual exam.        HPI  Fatmata Murdock is a 71 y.o. female, , who presents for annual well woman exam.  She is postmenopausal.  Patient denies vaginal bleeding. ..  Patient reports problems with: none. There were no changes to her medical or surgical history since her last visit.. Partner Status: Marital Status: single.  New Partners since last visit: no.     S/p TLH.  She wants to continue estrogen patch.    Patient is requesting a refill on medication. Patient denies any other issues or concerns at today's office visit.    Additional OB/GYN History   On HRT? Yes. Details: Estradiol patch  Last Pap :   wnl    History of abnormal Pap smear: no  Family history of uterine, colon, breast, or ovarian cancer: no  Performs monthly Self-Breast Exam: yes  Last mammogram:  Up to date    Last colonoscopy:  Up to date    Last DEXA: None  Exercises Regularly: no  Feelings of Anxiety or Depression: yes - Anxiety and Depression      Tobacco Usage?: No   OB History        14    Para   1    Term   1            AB   13    Living           SAB   11    IAB   2    Ectopic        Molar        Multiple        Live Births                    Health Maintenance   Topic Date Due   • COLORECTAL CANCER SCREENING  Never done   • TDAP/TD VACCINES (1 - Tdap) Never done   • ZOSTER VACCINE (1 of 2) Never done   • Pneumococcal Vaccine 65+ (1 of 1 - PPSV23) Never done   • DXA SCAN  2018   • HEPATITIS C SCREENING  Never done   • ANNUAL WELLNESS VISIT  Never done   • INFLUENZA VACCINE  Never done   • COVID-19 Vaccine (3 - Booster for Pfizer series) 10/22/2021   • MAMMOGRAM  2022       The additional following portions of the patient's history were reviewed and updated as appropriate: allergies, current medications, past family history, past medical history, past social history and past surgical history.    Review of Systems   Constitutional: Negative.    HENT: Negative.    Eyes:  "Negative.    Respiratory: Negative.    Cardiovascular: Negative.    Gastrointestinal: Negative.    Endocrine: Negative.    Genitourinary: Negative.    Musculoskeletal: Negative.    Skin: Negative.    Allergic/Immunologic: Negative.    Neurological: Negative.    Hematological: Negative.    Psychiatric/Behavioral: Negative.        I have reviewed and agree with the HPI, ROS, and historical information as entered above. Natty Clay, APRN    Objective   Ht 147.3 cm (58\")   Wt 44.9 kg (99 lb)   Breastfeeding No   BMI 20.69 kg/m²     Physical Exam  Vitals and nursing note reviewed. Exam conducted with a chaperone present.   Constitutional:       General: She is not in acute distress.     Appearance: Normal appearance. She is well-developed. She is not ill-appearing.   HENT:      Head: Normocephalic and atraumatic.   Neck:      Thyroid: No thyroid mass or thyromegaly.   Pulmonary:      Effort: Pulmonary effort is normal. No retractions.   Chest:      Chest wall: No mass.   Breasts:      Right: Normal. No mass, nipple discharge, skin change or tenderness.      Left: Normal. No mass, nipple discharge, skin change or tenderness.       Abdominal:      Palpations: Abdomen is soft. Abdomen is not rigid. There is no mass.      Tenderness: There is no abdominal tenderness. There is no guarding.      Hernia: No hernia is present. There is no hernia in the left inguinal area.   Genitourinary:     General: Normal vulva.      Labia:         Right: No rash, tenderness or lesion.         Left: No rash, tenderness or lesion.       Vagina: Normal. No vaginal discharge or lesions.      Uterus: Absent. Not enlarged, not fixed and not tender.       Adnexa: Right adnexa normal and left adnexa normal.        Right: No mass or tenderness.          Left: No mass or tenderness.        Rectum: No external hemorrhoid.   Musculoskeletal:      Cervical back: Normal range of motion. No muscular tenderness.   Skin:     General: Skin is warm and " dry.   Neurological:      Mental Status: She is alert and oriented to person, place, and time.   Psychiatric:         Mood and Affect: Mood normal.         Behavior: Behavior normal.            Assessment and Plan    Problem List Items Addressed This Visit     None      Visit Diagnoses     Women's annual routine gynecological examination    -  Primary    Menopausal state        Breast cancer screening by mammogram        Relevant Orders    Mammo Screening Digital Tomosynthesis Bilateral With CAD          1. GYN annual well woman exam.   2. Reviewed monthly self breast exams.  Instructed to call with lumps, pain, or breast discharge.  Yearly mammograms ordered.  3. Ordered mammogram today.  4. Osteoporosis screening ordered today.  5. Reviewed exercise as a preventative health measures.   6. Colonoscopy recommended.  7. Reviewed risks of ERT including increased risk of breast cancer, increased blood clots, increased heart disease.  Patient strongly desires to stay on or start ERT.  She understands we will use the lowest amount that adequately controls her symptoms.  8. Reccommended Flu Vaccine in Fall of each year.  9. Instructed on [place] exercises and gave patient educational information.  10. RTC in 1 year or PRN with problems.  11. Return in about 1 year (around 12/10/2022) for Annual physical.     Natty Clay, APRN  12/10/2021

## 2021-12-14 ENCOUNTER — TRANSCRIBE ORDERS (OUTPATIENT)
Dept: ADMINISTRATIVE | Facility: HOSPITAL | Age: 71
End: 2021-12-14

## 2021-12-14 DIAGNOSIS — Z12.31 VISIT FOR SCREENING MAMMOGRAM: Primary | ICD-10-CM

## 2021-12-16 ENCOUNTER — APPOINTMENT (OUTPATIENT)
Dept: OTHER | Facility: HOSPITAL | Age: 71
End: 2021-12-16

## 2021-12-16 ENCOUNTER — HOSPITAL ENCOUNTER (OUTPATIENT)
Dept: MAMMOGRAPHY | Facility: HOSPITAL | Age: 71
Discharge: HOME | End: 2021-12-16
Admitting: NURSE PRACTITIONER

## 2021-12-16 DIAGNOSIS — Z12.31 VISIT FOR SCREENING MAMMOGRAM: ICD-10-CM

## 2021-12-16 PROCEDURE — 77067 SCR MAMMO BI INCL CAD: CPT | Performed by: RADIOLOGY

## 2021-12-16 PROCEDURE — 77067 SCR MAMMO BI INCL CAD: CPT

## 2021-12-16 PROCEDURE — 77063 BREAST TOMOSYNTHESIS BI: CPT | Performed by: RADIOLOGY

## 2021-12-16 PROCEDURE — 77063 BREAST TOMOSYNTHESIS BI: CPT

## 2021-12-22 ENCOUNTER — TELEPHONE (OUTPATIENT)
Dept: OBSTETRICS AND GYNECOLOGY | Facility: CLINIC | Age: 71
End: 2021-12-22

## 2021-12-22 NOTE — TELEPHONE ENCOUNTER
Reviewed Imaging tab; no report yet available. Encouraged patient to call [facility] for follow up. Verbalized understanding.

## 2022-01-06 ENCOUNTER — TELEPHONE (OUTPATIENT)
Dept: MAMMOGRAPHY | Facility: HOSPITAL | Age: 72
End: 2022-01-06

## 2022-01-06 NOTE — TELEPHONE ENCOUNTER
Returned patientt call; patient is scheduled for additional views. Patient questioned what area is being targeted. Questions answered, support given. Encouraged to call back with further questions or concerns. Patient verbalized understanding.

## 2022-02-11 ENCOUNTER — APPOINTMENT (OUTPATIENT)
Dept: MAMMOGRAPHY | Facility: HOSPITAL | Age: 72
End: 2022-02-11

## 2022-02-23 ENCOUNTER — HOSPITAL ENCOUNTER (OUTPATIENT)
Dept: CARDIOLOGY | Facility: HOSPITAL | Age: 72
Discharge: HOME | End: 2022-02-23
Admitting: INTERNAL MEDICINE

## 2022-02-23 ENCOUNTER — OFFICE VISIT (OUTPATIENT)
Dept: CARDIOLOGY | Facility: CLINIC | Age: 72
End: 2022-02-23

## 2022-02-23 VITALS
WEIGHT: 98 LBS | HEART RATE: 54 BPM | OXYGEN SATURATION: 96 % | SYSTOLIC BLOOD PRESSURE: 134 MMHG | BODY MASS INDEX: 20.57 KG/M2 | HEIGHT: 58 IN | DIASTOLIC BLOOD PRESSURE: 68 MMHG

## 2022-02-23 VITALS — WEIGHT: 99 LBS | BODY MASS INDEX: 19.96 KG/M2 | HEIGHT: 59 IN

## 2022-02-23 DIAGNOSIS — Z82.49 FAMILY HISTORY OF HEART DISEASE: ICD-10-CM

## 2022-02-23 DIAGNOSIS — I71.20 THORACIC AORTIC ANEURYSM WITHOUT RUPTURE: ICD-10-CM

## 2022-02-23 DIAGNOSIS — I35.1 MODERATE AORTIC REGURGITATION: Primary | ICD-10-CM

## 2022-02-23 LAB
ASCENDING AORTA: 3.8 CM
BH CV ECHO MEAS - AI DEC SLOPE: 317.6 CM/SEC^2
BH CV ECHO MEAS - AI MAX PG: 96.2 MMHG
BH CV ECHO MEAS - AI MAX VEL: 490.3 CM/SEC
BH CV ECHO MEAS - AI P1/2T: 452.2 MSEC
BH CV ECHO MEAS - AO MAX PG (FULL): 7.3 MMHG
BH CV ECHO MEAS - AO MAX PG: 15 MMHG
BH CV ECHO MEAS - AO MEAN PG (FULL): 4.1 MMHG
BH CV ECHO MEAS - AO MEAN PG: 8.7 MMHG
BH CV ECHO MEAS - AO ROOT AREA (BSA CORRECTED): 1.4 CM2
BH CV ECHO MEAS - AO ROOT AREA: 7.5 CM^2
BH CV ECHO MEAS - AO ROOT DIAM: 3.1 CM
BH CV ECHO MEAS - AO V2 MAX: 191.1 CM/SEC
BH CV ECHO MEAS - AO V2 MEAN: 140.8 CM/SEC
BH CV ECHO MEAS - AO V2 VTI: 41.6 CM
BH CV ECHO MEAS - ASC AORTA: 4.2 CM
BH CV ECHO MEAS - AVA(I,A): 2.6 CM^2
BH CV ECHO MEAS - AVA(I,D): 2.6 CM^2
BH CV ECHO MEAS - AVA(V,A): 2.5 CM^2
BH CV ECHO MEAS - AVA(V,D): 2.5 CM^2
BH CV ECHO MEAS - BZI_METRIC_HEIGHT: 147.3 CM
BH CV ECHO MEAS - EDV(CUBED): 129.4 ML
BH CV ECHO MEAS - EDV(MOD-SP2): 99.5 ML
BH CV ECHO MEAS - EDV(MOD-SP4): 113 ML
BH CV ECHO MEAS - EDV(TEICH): 121.5 ML
BH CV ECHO MEAS - EF(CUBED): 79.5 %
BH CV ECHO MEAS - EF(MOD-BP): 66 %
BH CV ECHO MEAS - EF(MOD-SP2): 61 %
BH CV ECHO MEAS - EF(MOD-SP4): 70.9 %
BH CV ECHO MEAS - EF(TEICH): 71.6 %
BH CV ECHO MEAS - ESV(CUBED): 26.5 ML
BH CV ECHO MEAS - ESV(MOD-SP2): 38.8 ML
BH CV ECHO MEAS - ESV(MOD-SP4): 32.9 ML
BH CV ECHO MEAS - ESV(TEICH): 34.5 ML
BH CV ECHO MEAS - FS: 41 %
BH CV ECHO MEAS - IVS/LVPW: 0.79
BH CV ECHO MEAS - IVSD: 0.58 CM
BH CV ECHO MEAS - LA DIMENSION: 3.7 CM
BH CV ECHO MEAS - LA/AO: 1.2
BH CV ECHO MEAS - LAD MAJOR: 4.2 CM
BH CV ECHO MEAS - LAT PEAK E' VEL: 5.1 CM/SEC
BH CV ECHO MEAS - LATERAL E/E' RATIO: 7.7
BH CV ECHO MEAS - LV IVRT: 0.12 SEC
BH CV ECHO MEAS - LV MASS(C)D: 109 GRAMS
BH CV ECHO MEAS - LV MAX PG: 7.7 MMHG
BH CV ECHO MEAS - LV MEAN PG: 4.6 MMHG
BH CV ECHO MEAS - LV V1 MAX: 139 CM/SEC
BH CV ECHO MEAS - LV V1 MEAN: 100.2 CM/SEC
BH CV ECHO MEAS - LV V1 VTI: 31.4 CM
BH CV ECHO MEAS - LVIDD: 5.1 CM
BH CV ECHO MEAS - LVIDS: 3 CM
BH CV ECHO MEAS - LVLD AP2: 7.9 CM
BH CV ECHO MEAS - LVLD AP4: 8 CM
BH CV ECHO MEAS - LVLS AP2: 6.6 CM
BH CV ECHO MEAS - LVLS AP4: 6.5 CM
BH CV ECHO MEAS - LVOT AREA (M): 3.5 CM^2
BH CV ECHO MEAS - LVOT AREA: 3.5 CM^2
BH CV ECHO MEAS - LVOT DIAM: 2.1 CM
BH CV ECHO MEAS - LVPWD: 0.74 CM
BH CV ECHO MEAS - MED PEAK E' VEL: 7.3 CM/SEC
BH CV ECHO MEAS - MEDIAL E/E' RATIO: 5.4
BH CV ECHO MEAS - MV A MAX VEL: 63.7 CM/SEC
BH CV ECHO MEAS - MV DEC SLOPE: 318.6 CM/SEC^2
BH CV ECHO MEAS - MV DEC TIME: 0.12 SEC
BH CV ECHO MEAS - MV E MAX VEL: 39.3 CM/SEC
BH CV ECHO MEAS - MV E/A: 0.62
BH CV ECHO MEAS - MV MAX PG: 2.4 MMHG
BH CV ECHO MEAS - MV MEAN PG: 1.1 MMHG
BH CV ECHO MEAS - MV P1/2T MAX VEL: 65.5 CM/SEC
BH CV ECHO MEAS - MV P1/2T: 60.3 MSEC
BH CV ECHO MEAS - MV V2 MAX: 77.6 CM/SEC
BH CV ECHO MEAS - MV V2 MEAN: 49.2 CM/SEC
BH CV ECHO MEAS - MV V2 VTI: 21.4 CM
BH CV ECHO MEAS - MVA P1/2T LCG: 3.4 CM^2
BH CV ECHO MEAS - MVA(P1/2T): 3.7 CM^2
BH CV ECHO MEAS - MVA(VTI): 5.1 CM^2
BH CV ECHO MEAS - PA ACC TIME: 0.14 SEC
BH CV ECHO MEAS - PA MAX PG: 2.7 MMHG
BH CV ECHO MEAS - PA PR(ACCEL): 16.4 MMHG
BH CV ECHO MEAS - PA V2 MAX: 81.6 CM/SEC
BH CV ECHO MEAS - PI END-D VEL: 82.4 CM/SEC
BH CV ECHO MEAS - RAP SYSTOLE: 3 MMHG
BH CV ECHO MEAS - RVSP: 22 MMHG
BH CV ECHO MEAS - SV(AO): 314.1 ML
BH CV ECHO MEAS - SV(CUBED): 102.9 ML
BH CV ECHO MEAS - SV(LVOT): 109.8 ML
BH CV ECHO MEAS - SV(MOD-SP2): 60.7 ML
BH CV ECHO MEAS - SV(MOD-SP4): 80.1 ML
BH CV ECHO MEAS - SV(TEICH): 87 ML
BH CV ECHO MEAS - TAPSE (>1.6): 1.6 CM
BH CV ECHO MEAS - TR MAX PG: 18 MMHG
BH CV ECHO MEAS - TR MAX VEL: 215 CM/SEC
BH CV ECHO MEASUREMENTS AVERAGE E/E' RATIO: 6.34
BH CV VAS BP LEFT ARM: NORMAL MMHG
BH CV XLRA - RV BASE: 2.8 CM
BH CV XLRA - RV LENGTH: 6.7 CM
BH CV XLRA - RV MID: 2.2 CM
BH CV XLRA - TDI S': 11.8 CM/SEC
IVRT: 121 MSEC
LEFT ATRIUM VOLUME INDEX: 39 ML/M2
LEFT ATRIUM VOLUME: 53.3 ML
LV EF 2D ECHO EST: 60 %
MAXIMAL PREDICTED HEART RATE: 150 BPM
STRESS TARGET HR: 128 BPM

## 2022-02-23 PROCEDURE — 99213 OFFICE O/P EST LOW 20 MIN: CPT | Performed by: INTERNAL MEDICINE

## 2022-02-23 PROCEDURE — 93306 TTE W/DOPPLER COMPLETE: CPT

## 2022-02-23 PROCEDURE — 93306 TTE W/DOPPLER COMPLETE: CPT | Performed by: INTERNAL MEDICINE

## 2022-02-23 NOTE — PROGRESS NOTES
[facility] Cardiology    Patient ID: Fatmata Murdock is a 71 y.o. female.  : 1950   Contact: [phone]    Encounter date: 2022    PCP: Ruth Caceres MD      Chief complaint: No chief complaint on file.      Problem List:  1. Aortic aneurysm  a. CT scan for coronary artery calcification 2020 revealed an enlargement of the ascending aorta up to 4.1 cm.  The coronary artery calcium score was 4.  b. Echocardiogram, 2021: EF 60%. Moderate dilation on ascending aorta. Aortic size index 2.64. Moderate AR.   c. Echocardiogram, 2022: EF 60%. Aortic root dilation 3.8 cm. Moderate AR.  Aortic size index 2.8.  d. CT scan of the chest with contrast 3/15/2022 revealed a 4 cm a sending aorta at the level of the main pulmonary artery.  (Also noted is a 12 x 9 x 5 mm calcified right paracentral structure at the T4-5 level which could be a calcified chronic disc excreted extrusion or meningioma.)  2. Aortic regurgitation   3. Family history of CAD  4. Osteoporosis  a. Dr. Guo  5. Hypothyroidism  6. Right subdural hematoma, s/p two shayy hole washout, 19, [facility]  7. Hypogammaglobulinemia  a. Followed by Dr. Guan  b. IgG infusions monthly  8. S/p vagotomy for duodenal ulcer    Allergies   Allergen Reactions   • Rocephin [Ceftriaxone] Nausea And Vomiting   • Doxycycline GI Intolerance   • Effexor Xr [Venlafaxine Hcl Er] Other (See Comments)     Restless legs, dermatitis    • Penicillins GI Intolerance   • Sulfa Antibiotics GI Intolerance       Current Medications:    Current Outpatient Medications:   •  estradiol (VIVELLE-DOT) 0.1 MG/24HR patch, Place 1 patch on the skin as directed by provider 2 (Two) Times a Week., Disp: 24 patch, Rfl: 3  •  Synthroid 112 MCG tablet, TAKE ONE TABLET BY MOUTH DAILY, Disp: 90 tablet, Rfl: 2  •  Teriparatide, Recombinant, (Forteo) 620 MCG/2.48ML injection, Inject  under the skin into the appropriate area as directed., Disp: ,  "Rfl:   •  Unable to find, 1 each Every 30 (Thirty) Days. Med Name: Immunoglobulin infusion, Disp: , Rfl:   •  Wellbutrin  MG 12 hr tablet, Take 200 mg by mouth Every Other Day., Disp: , Rfl:     HPI    Fatmata Murdock is a 71 y.o. female who presents today for an annual follow up of family history of heart disease and aortic aneurysm. Since last visit, the patient has been doing well overall from a cardiovascular standpoint. Her systolic blood pressure at home remains around 120's. Sometimes she has shallow breathing when she lays down. She used to run routinely but does not currently exercise. Patient denies chest pain, palpitations, edema, dizziness, and syncope.      The following portions of the patient's history were reviewed and updated as appropriate: allergies, current medications and problem list.    Pertinent positives as listed in the HPI.  All other systems reviewed are negative.         Vitals:    02/23/22 1257 02/23/22 1320   BP: 130/64 134/68   BP Location: Left arm Left arm   Patient Position: Sitting Sitting   Pulse: 54    SpO2: 96%    Weight: 44.5 kg (98 lb)    Height: 147.3 cm (58\")        Physical Exam:  General: Alert and oriented.  Neck: Jugular venous pressure is within normal limits. Carotids have normal upstrokes without bruits.   Cardiovascular: Heart has a nondisplaced focal PMI. Regular rate and rhythm. 2/6 high-pitched DM RUSB.  Lungs: Clear, no rales or wheezes. Equal expansion is noted.   Extremities: Show no edema.  Skin: Warm and dry.  Neurologic: Nonfocal.     Diagnostic Data (reviewed with patient):    Lab date: 11/5/2021  • CMP: Glu 79, BUN 21, Creat 0.52, eGFR >60, Na 138, K 4.2, Cl 103, CO2 26, Ca 9.2, Alk Phos 80, AST 21, ALT 19  Lab date: 02/10/2021  · FLP: , , HDL 67, LDL 78    Lab Results   Component Value Date    TSH 0.221 (L) 03/16/2021        Procedures      Assessment:    ICD-10-CM ICD-9-CM   1. Moderate aortic regurgitation  I35.1 424.1   2. Thoracic " aortic aneurysm without rupture (HCC)  I71.2 441.2   3. Family history of heart disease  Z82.49 V17.49         Plan:  1. Continue monitoring blood pressure at home with goal for systolic of 100-110 mmHg. If >120 mmHg she is to call our office for further recommendations.   2. Repeat echocardiogram in 2 years.   3. Begin routine aerobic exercise for at least 30 minutes 5 days per week. If she experiences new onset of dyspnea on exertion with routine exercise she should report this to our office.   4. Continue all other current medications.  5. F/up in 12 months, sooner if needed.    Scribed for Blanche Velasco MD by Ania Gomez. 2/23/2022 13:16 EST      I Blanche Velasco MD personally performed the services described in this documentation as scribed by the above individual in my presence, and it is both accurate and complete.    Blanche Velasco MD, [facility]    Addendum: The problem list above was modified on 3/24/2022 to reflect the most recent CT scan.  I spoke with Dr. Toledo regarding the patient's small BSA at 1.34.  As we know that small women have a significant complications with procedures in general and her aorta has not changed any in 2 years we will closely follow her ascending aortic dimension.  This information was relayed to the patient.  I will curbside a neurosurgeon regarding the need for follow-up of her L4-L5 abnormality.    Blanche Velasco MD, [facility]

## 2022-03-02 ENCOUNTER — TELEPHONE (OUTPATIENT)
Dept: CARDIOLOGY | Facility: CLINIC | Age: 72
End: 2022-03-02

## 2022-03-02 NOTE — TELEPHONE ENCOUNTER
Pt had to order a new smaller bp cuff and waiting for that to arrive. She will call or send bp readings through [employer] after she receives the cuff

## 2022-03-08 ENCOUNTER — TELEPHONE (OUTPATIENT)
Dept: CARDIOLOGY | Facility: CLINIC | Age: 72
End: 2022-03-08

## 2022-03-08 NOTE — TELEPHONE ENCOUNTER
PT calls today requesting more time to get her BP down on her own. Explained again the importance of getting SBP between 100-110. She verbalized understanding.

## 2022-03-10 ENCOUNTER — TELEPHONE (OUTPATIENT)
Dept: ENDOCRINOLOGY | Facility: CLINIC | Age: 72
End: 2022-03-10

## 2022-03-10 DIAGNOSIS — I77.810 AORTIC ROOT DILATION: Primary | ICD-10-CM

## 2022-03-10 DIAGNOSIS — Z79.890 HORMONE REPLACEMENT THERAPY (HRT): ICD-10-CM

## 2022-03-10 RX ORDER — ESTRADIOL 0.1 MG/D
1 FILM, EXTENDED RELEASE TRANSDERMAL 2 TIMES WEEKLY
Qty: 24 PATCH | Refills: 3 | Status: SHIPPED | OUTPATIENT
Start: 2022-03-10 | End: 2022-03-11

## 2022-03-10 RX ORDER — LEVOTHYROXINE SODIUM 112 MCG
112 TABLET ORAL DAILY
Qty: 90 TABLET | Refills: 2 | Status: SHIPPED | OUTPATIENT
Start: 2022-03-10 | End: 2022-04-05 | Stop reason: SDUPTHER

## 2022-03-10 NOTE — TELEPHONE ENCOUNTER
Patient called and stated that she would like her medications to be called into [facility].

## 2022-03-10 NOTE — PROGRESS NOTES
Dr. Velasco discussed with pt via phone the need for ct scan with contrast to further evaluate aortic root dilation. PT agreeable to proceed.

## 2022-03-10 NOTE — TELEPHONE ENCOUNTER
"Called and spoke with Fatmata.  She switched to [facility] home delivery pharmacy, as it is cheaper than her previous pharmacy.  She is requesting refills for her estradiol patches to be sent in.        She is requesting them to be sent in as 3 month supply, with \"SHYAM\"- (dispense as written) on the prescription in order for her to get brand name only.      Pended Rx to Leon[facility] to sign and send in.   "

## 2022-03-11 ENCOUNTER — APPOINTMENT (OUTPATIENT)
Dept: MAMMOGRAPHY | Facility: HOSPITAL | Age: 72
End: 2022-03-11

## 2022-03-11 RX ORDER — ESTRADIOL 0.1 MG/D
1 FILM, EXTENDED RELEASE TRANSDERMAL 2 TIMES WEEKLY
Qty: 24 PATCH | Refills: 3 | Status: SHIPPED | OUTPATIENT
Start: 2022-03-14 | End: 2023-03-28

## 2022-03-15 ENCOUNTER — HOSPITAL ENCOUNTER (OUTPATIENT)
Dept: CT IMAGING | Facility: HOSPITAL | Age: 72
Discharge: HOME | End: 2022-03-15
Admitting: INTERNAL MEDICINE

## 2022-03-15 DIAGNOSIS — I77.810 AORTIC ROOT DILATION: ICD-10-CM

## 2022-03-15 LAB — CREAT BLDA-MCNC: 0.5 MG/DL (ref 0.6–1.3)

## 2022-03-15 PROCEDURE — 82565 ASSAY OF CREATININE: CPT

## 2022-03-15 PROCEDURE — 25010000002 IOPAMIDOL 61 % SOLUTION: Performed by: INTERNAL MEDICINE

## 2022-03-15 PROCEDURE — 71260 CT THORAX DX C+: CPT

## 2022-03-15 RX ADMIN — IOPAMIDOL 75 ML: 612 INJECTION, SOLUTION INTRAVENOUS at 11:19

## 2022-03-23 ENCOUNTER — TELEPHONE (OUTPATIENT)
Dept: OBSTETRICS AND GYNECOLOGY | Facility: CLINIC | Age: 72
End: 2022-03-23

## 2022-03-23 NOTE — TELEPHONE ENCOUNTER
Patient reports that [facility] sent her a letter saying that PA was needed. Informed patient of Carolyn's encounter. It appears patient recently switched to mail order; perhaps they did not see the preference noted. Patient plans to follow up appropriately.

## 2022-03-23 NOTE — TELEPHONE ENCOUNTER
Pt. Was requesting [employer] brand which was put on the request. I do not show a PA was needed for this.

## 2022-03-28 ENCOUNTER — PATIENT MESSAGE (OUTPATIENT)
Dept: CARDIOLOGY | Facility: CLINIC | Age: 72
End: 2022-03-28

## 2022-03-30 DIAGNOSIS — D32.9 MENINGIOMA: Primary | ICD-10-CM

## 2022-03-30 DIAGNOSIS — Z98.890 H/O VAGOTOMY: ICD-10-CM

## 2022-03-30 DIAGNOSIS — R93.89 ABNORMAL CT SCAN: ICD-10-CM

## 2022-03-30 NOTE — PROGRESS NOTES
Need to clarify recommendation- Dr. Smith recommends evaluation by neurosurgeon. Dr. Velasco only discussed pt with Dr. Smith who recommends evaluation with neurosurgical surgeon

## 2022-03-30 NOTE — PROGRESS NOTES
Dr. Velasco discussed this pt's recent CT scan with Dr. Smith. He would like to see her in the office, referral placed. PT aware that his office will reach with appt.     Pt has also requested a referral to GI. Once she obtains the name of her previous GI provider, I will place a referral for GI here.

## 2022-04-05 RX ORDER — LEVOTHYROXINE SODIUM 112 UG/1
112 TABLET ORAL DAILY
Qty: 90 TABLET | Refills: 0 | Status: SHIPPED | OUTPATIENT
Start: 2022-04-05 | End: 2022-06-24 | Stop reason: SDUPTHER

## 2022-04-14 ENCOUNTER — HOSPITAL ENCOUNTER (OUTPATIENT)
Dept: MAMMOGRAPHY | Facility: HOSPITAL | Age: 72
Discharge: HOME | End: 2022-04-14
Admitting: RADIOLOGY

## 2022-04-14 DIAGNOSIS — R92.8 ABNORMAL MAMMOGRAM: ICD-10-CM

## 2022-04-14 PROCEDURE — 77065 DX MAMMO INCL CAD UNI: CPT

## 2022-04-14 PROCEDURE — 77065 DX MAMMO INCL CAD UNI: CPT | Performed by: RADIOLOGY

## 2022-05-02 ENCOUNTER — OFFICE VISIT (OUTPATIENT)
Dept: NEUROSURGERY | Facility: CLINIC | Age: 72
End: 2022-05-02

## 2022-05-02 VITALS
BODY MASS INDEX: 20.82 KG/M2 | SYSTOLIC BLOOD PRESSURE: 138 MMHG | HEIGHT: 58 IN | TEMPERATURE: 97.5 F | DIASTOLIC BLOOD PRESSURE: 66 MMHG | WEIGHT: 99.2 LBS

## 2022-05-02 DIAGNOSIS — D49.2 THORACIC SPINE TUMOR: Primary | ICD-10-CM

## 2022-05-02 PROCEDURE — 99204 OFFICE O/P NEW MOD 45 MIN: CPT | Performed by: STUDENT IN AN ORGANIZED HEALTH CARE EDUCATION/TRAINING PROGRAM

## 2022-05-02 NOTE — PROGRESS NOTES
Patient: Fatmata Murdock  : 1950    Primary Care Provider: Ruth Caceres MD    Requesting Provider: As above      Chief Complaint: Thoracic spinal lesion    History of Present Illness: This is a 72 y.o. female who presents for evaluation of a thoracic spinal lesion at T4-5.  The patient underwent a CT scan of her chest for reasons unknown to her.  From this they found a what appears to be calcified lesion around T4-5.  In talking the patient, she does not appear to of any symptoms related to this or spinal cord compression.  She is an avid runner and still runs 2 miles a day.  She denies any significant lower extremity weakness or progressively worsening balance issues.  She does have issues with urinary urgency, but denies any concerning episodes of incontinence.  Overall, she feels well.      PMHX  Allergies:  Allergies   Allergen Reactions   • Rocephin [Ceftriaxone] Nausea And Vomiting   • Doxycycline GI Intolerance   • Effexor Xr [Venlafaxine Hcl Er] Other (See Comments)     Restless legs, dermatitis    • Hydrocodone Unknown (See Comments)   • Penicillins GI Intolerance   • Sulfa Antibiotics GI Intolerance     Medications    Current Outpatient Medications:   •  cholecalciferol (VITAMIN D3) 1.25 MG (11235 UT) capsule, Take 50,000 Units by mouth., Disp: , Rfl:   •  estradiol (VIVELLE-DOT) 0.1 MG/24HR patch, Place 1 patch on the skin as directed by provider 2 (Two) Times a Week., Disp: 24 patch, Rfl: 3  •  levothyroxine (Synthroid) 112 MCG tablet, Take 1 tablet by mouth Daily., Disp: 90 tablet, Rfl: 0  •  Teriparatide, Recombinant, (FORTEO) 620 MCG/2.48ML injection, Inject  under the skin into the appropriate area as directed., Disp: , Rfl:   •  Unable to find, 1 each Every 30 (Thirty) Days. Med Name: Immunoglobulin infusion, Disp: , Rfl:   •  Wellbutrin  MG 12 hr tablet, Take 200 mg by mouth Every Other Day., Disp: , Rfl:   Past Medical History:  Past Medical History:   Diagnosis Date   •  Anemia May 1997    The anemia began due to ulcer.   • Anxiety    • Arthritis June 2005   • Depression    • Endometriosis    • Fibroid    • Fibroids     LEIOMYOMA OF UTERUS   • H/O gastric ulcer    • Hepatitis A    • Hypothyroidism    • Iron deficiency    • Leukopenia    • Migraine    • MRSA (methicillin resistant Staphylococcus aureus) September 2014    No comments   • MRSA infection    • Osteopenia    • Ovarian cyst    • Pelvic pain    • Routine gynecological examination    • Thyroid condition    • Vitamin B12 deficiency      Past Surgical History:  Past Surgical History:   Procedure Laterality Date   • APPENDECTOMY     • COLONOSCOPY     • ESOPHAGUS SURGERY     • HAND SURGERY Right 03/2020   • HYSTERECTOMY     • OOPHORECTOMY      unilateral    • OTHER SURGICAL HISTORY      SUBDURAL HEMATOMA REPAIR   • VAGOTOMY AND PYLOROPLASTY  1981     Social Hx:  Social History     Tobacco Use   • Smoking status: Never Smoker   • Smokeless tobacco: Never Used   Vaping Use   • Vaping Use: Never used   Substance Use Topics   • Alcohol use: Never   • Drug use: Never     Family Hx:  Family History   Problem Relation Age of Onset   • No Known Problems Mother    • No Known Problems Father    • Hypertension Sister    • Hypertension Sister    • Breast cancer Neg Hx    • Ovarian cancer Neg Hx    • Uterine cancer Neg Hx    • Colon cancer Neg Hx    • Melanoma Neg Hx    • Prostate cancer Neg Hx    • Deep vein thrombosis Neg Hx    • Pulmonary embolism Neg Hx    • Coronary artery disease Neg Hx    • Stroke Neg Hx    • Osteoporosis Neg Hx    • Diabetes Neg Hx      Review of Systems:        Review of Systems   Constitutional: Positive for activity change. Negative for appetite change, chills, diaphoresis, fatigue, fever and unexpected weight change.   HENT: Negative for congestion, dental problem, drooling, ear discharge, ear pain, facial swelling, hearing loss, mouth sores, nosebleeds, postnasal drip, rhinorrhea, sinus pressure, sneezing, sore  "throat, tinnitus, trouble swallowing and voice change.    Eyes: Negative for photophobia, pain, discharge, redness, itching and visual disturbance.   Respiratory: Negative for apnea, cough, choking, chest tightness, shortness of breath, wheezing and stridor.    Cardiovascular: Negative for chest pain, palpitations and leg swelling.   Gastrointestinal: Positive for abdominal pain and nausea. Negative for abdominal distention, anal bleeding, blood in stool, constipation, diarrhea, rectal pain and vomiting.   Endocrine: Negative for cold intolerance, heat intolerance, polydipsia, polyphagia and polyuria.   Genitourinary: Negative for decreased urine volume, difficulty urinating, dysuria, enuresis, flank pain, frequency, genital sores, hematuria and urgency.   Musculoskeletal: Negative for arthralgias, back pain, gait problem, joint swelling, myalgias, neck pain and neck stiffness.   Skin: Negative for color change, pallor, rash and wound.   Allergic/Immunologic: Positive for immunocompromised state. Negative for environmental allergies and food allergies.   Neurological: Positive for light-headedness. Negative for dizziness, tremors, seizures, syncope, facial asymmetry, speech difficulty, weakness, numbness and headaches.   Hematological: Negative for adenopathy. Bruises/bleeds easily.   Psychiatric/Behavioral: Negative for agitation, behavioral problems, confusion, decreased concentration, dysphoric mood, hallucinations, self-injury, sleep disturbance and suicidal ideas. The patient is not nervous/anxious and is not hyperactive.    All other systems reviewed and are negative.       Physical Exam:   /66 (BP Location: Right arm, Patient Position: Sitting, Cuff Size: Adult)   Temp 97.5 °F (36.4 °C) (Infrared)   Ht 147.3 cm (58\")   Wt 45 kg (99 lb 3.2 oz)   BMI 20.73 kg/m²   Awake, alert and oriented x 3  Speech f/c  Opens eyes spont  Pupils 3 mm rx bilaterally  Extraocular muscles intact bilaterally  Normal " sensation to light touch in all 3 distributions of CN V bilaterally  Face symmetric bilaterally  Tongue midline  5/5 in all 4 ext  Normal sensation to light touch in all 4 ext  2+DTR's  No thompson's or clonus bilaterally  No pronator drift or dysmetria  Gait not assessed    Diagnostic Studies:  All neurological imaging studies were independently reviewed unless stated otherwise    Assessment/Plan:  This is a 72 y.o. female presenting for evaluation of a calcified thoracic lesion at T4-5.  In reviewing the patient's CT scan, this appears to be a calcified lesion, likely meningioma.  In talking the patient, however, she has no symptoms associated with this.  She is healthy and has good strength in her legs.  I believe this to be an incidental finding.  As such, there is no indication for intervention at this time.  We will plan of the patient return to clinic in 6 months with a thoracic MRI with and without contrast to evaluate for any new progressive symptoms.    Diagnoses and all orders for this visit:    1. Thoracic spine tumor (Primary)        Juan C Evans MD  05/02/22  09:02 EDT

## 2022-05-13 ENCOUNTER — TELEPHONE (OUTPATIENT)
Dept: NEUROSURGERY | Facility: CLINIC | Age: 72
End: 2022-05-13

## 2022-05-13 ENCOUNTER — TELEPHONE (OUTPATIENT)
Dept: NEUROLOGY | Facility: OTHER | Age: 72
End: 2022-05-13

## 2022-05-13 NOTE — TELEPHONE ENCOUNTER
Please see message below. Pt is requesting sooner appt. She was seen first on 5/2/22 w/ Dr. Evans and her next f/u is for 6mos 12/8/22 w/ MRI. Please advise. Thanks a lot.

## 2022-05-13 NOTE — TELEPHONE ENCOUNTER
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 IS AVAILABLE-PT IS ALSO ASKING ABOUT SURGERY-PLEASE CONTACT PT FOR SCHEDULING THANK YOU!

## 2022-05-13 NOTE — TELEPHONE ENCOUNTER
Patient called to reschedule neurosurgery appointment. I was able to transfer call to Ami in that department.

## 2022-05-16 NOTE — TELEPHONE ENCOUNTER
Provider:  Dr. Evans  Surgery:    Surgery Date:    Last visit:   Office Visit with Juan C Evans MD (05/02/2022)    Next visit: 12/08/2022    STEPHAN:     N/A    Reason for call:         I spoke with patient she is wanting to come back into the office sooner because she states she is having trouble getting through day to do. She states some of the information at the visit she may just not have been thinking but she is not able to exercise or walk like she used to. She stated it has been about 3 months since she she has done a 2 mile run. She denies lower extremity weakness but she does have many balance issue she is constantly hanging onto a rail and sometimes she trips more than normal.   Patient wants to know if Dr. Evans is 100% sure that it is a meningioma on the spine? She spoke to her bone and mineral Doctor at  and he advised her that he did not think it is a meningioma he thinks it is a the vertebra sticking out. She gave him a copy of her MRI but he did not have an actual disc.  She thinks she needs to come back in sooner (like in October) because she was told it usually takes 2-3 months for her to get in for surgery so she would rather come in sooner and possibly have the surgery in December instead of her 6 month follow up in December.  The patient states she uses running to help with her depression and she is not able to run like she used to be able to so she feels lost. She is staying down and depressed more now that she cant exercise like she used to.  She also wants to know due to the location of the Meningioma will that cause problems with her yawning because she is not able to do a full complete yawn?    Please advise.

## 2022-05-18 ENCOUNTER — TELEPHONE (OUTPATIENT)
Dept: NEUROSURGERY | Facility: CLINIC | Age: 72
End: 2022-05-18

## 2022-05-18 NOTE — TELEPHONE ENCOUNTER
Caller: BLAISE    Relationship:     Best call back number: [phone]    What form or medical record are you requesting:   PROGRESS NOTE 5/2/2022    How would you like to receive the form or medical records (pick-up, mail, fax): FAX  If fax, what is the fax number: [phone]    Timeframe paperwork needed: AS    Additional notes: BLAISE FROM [facility] NEEDING PROGRESS NOTE AND ANY IMAGING.

## 2022-05-31 ENCOUNTER — PATIENT MESSAGE (OUTPATIENT)
Dept: NEUROSURGERY | Facility: CLINIC | Age: 72
End: 2022-05-31

## 2022-06-02 ENCOUNTER — TELEPHONE (OUTPATIENT)
Dept: NEUROSURGERY | Facility: CLINIC | Age: 72
End: 2022-06-02

## 2022-06-02 NOTE — TELEPHONE ENCOUNTER
Caller: Fatmata Murdock    Relationship: Self    Best call back number: [phone]    What is the best time to reach you: ANYTIME    Who are you requesting to speak with (clinical staff, provider,  specific staff member):CLINICAL STAFF    Do you know the name of the person who called: NA    What was the call regarding:PT CALLED AND IS ASKING IF IT IS OK TO CHANGE HER APPT. BACK TO December-PT DID SEND A MESSAGE THROUGH [facility]-SENDING TO OFFICE FOR CONFIRMATION TO CHANGE APPT. PT STATES OUR OFFICE MAY LEAVE A VM WITH CHANGED APPT. OR IF OUR OFFICE NEEDS TO SPEAK TO HER-SHE WILL CALL BACK-PLEASE CONTACT PT FOR APPT. THANK YOU!    Do you require a callback:YES

## 2022-06-02 NOTE — TELEPHONE ENCOUNTER
"Provider:  Cristina  Surgery: DAYANA   Surgery Date: NA   Last visit:  Office Visit with Juan C Evans MD (05/02/2022)   Next visit: 12/08/2022    Reason for call:         Patient is requesting to change appointment to December 8, 2022.  This was her original appointment date but she requested to change it to July.    Okay to change to back to December?    Fatmata Murdock  to Juan C Evans MD       5/31/22 7:11 AM  Good Morning,  I had re-scheduled my December appointment to July 11, 2022. I need to change this appointment back to December 8, 2022. If there is a problem with re-scheduling this appointment  please let me know. I can be reached at [phone].     Kindly,  Fatmata Murdock      Last OV 05/02/2022:    \"Assessment/Plan:  This is a 72 y.o. female presenting for evaluation of a calcified thoracic lesion at T4-5.  In reviewing the patient's CT scan, this appears to be a calcified lesion, likely meningioma.  In talking the patient, however, she has no symptoms associated with this.  She is healthy and has good strength in her legs.  I believe this to be an incidental finding.  As such, there is no indication for intervention at this time.  We will plan of the patient return to clinic in 6 months with a thoracic MRI with and without contrast to evaluate for any new progressive symptoms.     "

## 2022-06-23 ENCOUNTER — OFFICE VISIT (OUTPATIENT)
Dept: ENDOCRINOLOGY | Facility: CLINIC | Age: 72
End: 2022-06-23

## 2022-06-23 ENCOUNTER — LAB (OUTPATIENT)
Dept: LAB | Facility: HOSPITAL | Age: 72
End: 2022-06-23

## 2022-06-23 VITALS
HEART RATE: 81 BPM | OXYGEN SATURATION: 96 % | HEIGHT: 58 IN | WEIGHT: 98 LBS | BODY MASS INDEX: 20.57 KG/M2 | DIASTOLIC BLOOD PRESSURE: 60 MMHG | SYSTOLIC BLOOD PRESSURE: 132 MMHG

## 2022-06-23 DIAGNOSIS — E03.9 ACQUIRED HYPOTHYROIDISM: Primary | ICD-10-CM

## 2022-06-23 DIAGNOSIS — E03.9 ACQUIRED HYPOTHYROIDISM: ICD-10-CM

## 2022-06-23 LAB — TSH SERPL DL<=0.05 MIU/L-ACNC: 0.21 UIU/ML (ref 0.27–4.2)

## 2022-06-23 PROCEDURE — 84443 ASSAY THYROID STIM HORMONE: CPT

## 2022-06-23 PROCEDURE — 99213 OFFICE O/P EST LOW 20 MIN: CPT | Performed by: INTERNAL MEDICINE

## 2022-06-23 NOTE — PROGRESS NOTES
"     Office Note      Date: 2022  Patient Name: Fatmata Murdock  MRN: [id number]  : 1950    Chief Complaint   Patient presents with   • Hypothyroidism       History of Present Illness:   Fatmata Murdock is a 72 y.o. female who presents for Hypothyroidism  she is on levothyroxine 112 mcg per day    She is here for annual follow up      Subjective        Review of Systems:   Review of Systems   Constitutional: Negative for unexpected weight change.   HENT: Negative for trouble swallowing.    Cardiovascular: Negative for palpitations.   Endocrine: Positive for cold intolerance. Negative for heat intolerance.       The following portions of the patient's history were reviewed and updated as appropriate: allergies, current medications, past family history, past medical history, past social history, past surgical history and problem list.    Objective     Visit Vitals  /60 (BP Location: Left arm, Patient Position: Sitting, Cuff Size: Adult)   Pulse 81   Ht 147.3 cm (58\")   Wt 44.5 kg (98 lb)   SpO2 96%   BMI 20.48 kg/m²       Labs:    CBC w/DIFF  Lab Results   Component Value Date    WBC 7.3 2015    RBC 4.44 2015    HGB 14.5 2015    HCT 45 2015    .4 (H) 2015    MCH 32.7 (H) 2015    MCHC 32.2 2015    RDW 12.4 2015     2015    NEUTRORELPCT 73.0 2015    LYMPHORELPCT 18.9 2015    EOSRELPCT 1.4 2015    BASORELPCT 0.80 2015    NEUTROABS 5.30 2015    LYMPHSABS 1.37 2015    MONOSABS 0.40 2015    EOSABS 0.10 2015    BASOSABS 0.06 2015       T4  Free T4   Date Value Ref Range Status   2021 1.32 0.93 - 1.70 ng/dL Final       TSH  No results found for: TSHBASE     Physical Exam:  Physical Exam  Vitals reviewed.   Constitutional:       Appearance: Normal appearance.   HENT:      Head: Normocephalic and atraumatic.   Eyes:      Extraocular Movements: Extraocular movements intact.   Neck: "      Comments: No goiter  Lymphadenopathy:      Cervical: No cervical adenopathy.   Neurological:      Mental Status: She is alert.   Psychiatric:         Mood and Affect: Mood normal.         Thought Content: Thought content normal.         Judgment: Judgment normal.         Assessment / Plan      Assessment & Plan:  Problem List Items Addressed This Visit        Other    Acquired hypothyroidism - Primary    Current Assessment & Plan     Clinically euthyroid. Thyroid levels ordered. Medication to be adjusted accordingly.           Relevant Medications    levothyroxine (Synthroid) 112 MCG tablet    Other Relevant Orders    TSH           Hudson Maynard MD   06/23/2022

## 2022-06-24 RX ORDER — LEVOTHYROXINE SODIUM 112 UG/1
112 TABLET ORAL DAILY
Qty: 90 TABLET | Refills: 3 | Status: SHIPPED | OUTPATIENT
Start: 2022-06-24

## 2022-07-11 ENCOUNTER — APPOINTMENT (OUTPATIENT)
Dept: MRI IMAGING | Facility: HOSPITAL | Age: 72
End: 2022-07-11

## 2022-09-15 ENCOUNTER — TELEPHONE (OUTPATIENT)
Dept: CARDIOLOGY | Facility: CLINIC | Age: 72
End: 2022-09-15

## 2022-09-15 DIAGNOSIS — R94.31 ABNORMAL ELECTROCARDIOGRAM (ECG) (EKG): ICD-10-CM

## 2022-09-15 DIAGNOSIS — R53.83 FATIGUE, UNSPECIFIED TYPE: Primary | ICD-10-CM

## 2022-09-15 DIAGNOSIS — R42 DIZZINESS: ICD-10-CM

## 2022-09-15 NOTE — TELEPHONE ENCOUNTER
Patient called to report that she has had severe fatigue, dizziness and no stamina lately. She reports that she used to be able to run 6 miles, then it decreased 2 miles and now she can not work out. This started a few months ago.    She does not keep track of her BP and HR but reports when she goes for her infusions, SBP runs 120-130's, unsure of HR. She will start checking her BP and HR at home.    She is currently in a masters program and after she gets home from school, she can not do anything but lay around. She denies shortness of breath or leg swelling.     Last echo 2/23/2022- EF 60%. Moderate AR. Dilation of ascending aorta.     Lab Results   Component Value Date    TSH 0.211 (L) 06/23/2022   Labs 4/14/2022- Creat 0.49, BUN 15, K 4.3, Na 140    She reports that her PCP has checked her Vitamin D and B12 levels.     Did you want a repeat echo? Something else?

## 2022-09-19 NOTE — TELEPHONE ENCOUNTER
RETURNED call to pt but no answer. Lm on vm - echo ordered, follow up with pcp for labs for anemia and thyroid

## 2022-09-20 ENCOUNTER — TELEPHONE (OUTPATIENT)
Dept: ENDOCRINOLOGY | Facility: CLINIC | Age: 72
End: 2022-09-20

## 2022-09-20 ENCOUNTER — APPOINTMENT (OUTPATIENT)
Dept: CARDIOLOGY | Facility: HOSPITAL | Age: 72
End: 2022-09-20

## 2022-09-20 NOTE — TELEPHONE ENCOUNTER
PT CALLED STATING HER CARDIOLOGIST TOLD HER SHE MAY BE ANEMIC WITH HER THYROID AND WAS INSTRUCTED TO HAVE LABS DRAWN BY US. PT REQUESTED A CALL BACK TO CONSULT.

## 2022-09-21 DIAGNOSIS — E03.9 ACQUIRED HYPOTHYROIDISM: Primary | ICD-10-CM

## 2022-09-21 NOTE — TELEPHONE ENCOUNTER
Left message labs  are ordered and our walk in times for labs.  Also I will mail them in case she wants to go somewhere else

## 2022-09-21 NOTE — TELEPHONE ENCOUNTER
Hypothyroidism does not cause anemia. It  would probably be easier for her to see her pcp to be checked for anemia. If I do the test and it's abnormal, I am just going tell her to see her pcp anyway

## 2022-09-21 NOTE — TELEPHONE ENCOUNTER
Spoke to pt-she knows she is not anemic as she gets this checked every month.  Her cardiologists recommended thyroid check because of fatigue.  Ok for tft check?

## 2022-09-21 NOTE — TELEPHONE ENCOUNTER
Lab orders placed and printed over by ibeth's desk  We just did them 3 months ago. They have been stable for at leasst a year

## 2022-09-26 ENCOUNTER — TELEPHONE (OUTPATIENT)
Dept: CARDIOLOGY | Facility: CLINIC | Age: 72
End: 2022-09-26

## 2022-09-26 NOTE — TELEPHONE ENCOUNTER
PT called back this morning and states that she had further lab work up with another provider. Her iron, platelets and hemoglobin are low. They are planning for more labs and potential infusions. She would like to cancel the echo that was ordered and was appreciative of the care provided to her.     Scheduling notified to cancel echo per pt request

## 2022-10-10 ENCOUNTER — APPOINTMENT (OUTPATIENT)
Dept: CARDIOLOGY | Facility: HOSPITAL | Age: 72
End: 2022-10-10

## 2022-12-08 ENCOUNTER — HOSPITAL ENCOUNTER (OUTPATIENT)
Dept: MRI IMAGING | Facility: HOSPITAL | Age: 72
Discharge: HOME | End: 2022-12-08
Admitting: STUDENT IN AN ORGANIZED HEALTH CARE EDUCATION/TRAINING PROGRAM

## 2022-12-08 ENCOUNTER — OFFICE VISIT (OUTPATIENT)
Dept: NEUROSURGERY | Facility: CLINIC | Age: 72
End: 2022-12-08

## 2022-12-08 ENCOUNTER — APPOINTMENT (OUTPATIENT)
Dept: MRI IMAGING | Facility: HOSPITAL | Age: 72
End: 2022-12-08

## 2022-12-08 VITALS
WEIGHT: 97 LBS | HEIGHT: 58 IN | DIASTOLIC BLOOD PRESSURE: 70 MMHG | TEMPERATURE: 96.9 F | BODY MASS INDEX: 20.36 KG/M2 | SYSTOLIC BLOOD PRESSURE: 140 MMHG

## 2022-12-08 DIAGNOSIS — D49.2 THORACIC SPINE TUMOR: ICD-10-CM

## 2022-12-08 DIAGNOSIS — D49.2 THORACIC SPINE TUMOR: Primary | ICD-10-CM

## 2022-12-08 PROCEDURE — A9577 INJ MULTIHANCE: HCPCS | Performed by: STUDENT IN AN ORGANIZED HEALTH CARE EDUCATION/TRAINING PROGRAM

## 2022-12-08 PROCEDURE — 82565 ASSAY OF CREATININE: CPT

## 2022-12-08 PROCEDURE — 72157 MRI CHEST SPINE W/O & W/DYE: CPT

## 2022-12-08 PROCEDURE — 99213 OFFICE O/P EST LOW 20 MIN: CPT | Performed by: STUDENT IN AN ORGANIZED HEALTH CARE EDUCATION/TRAINING PROGRAM

## 2022-12-08 PROCEDURE — 0 GADOBENATE DIMEGLUMINE 529 MG/ML SOLUTION: Performed by: STUDENT IN AN ORGANIZED HEALTH CARE EDUCATION/TRAINING PROGRAM

## 2022-12-08 RX ORDER — BUPROPION HYDROCHLORIDE 100 MG/1
TABLET, EXTENDED RELEASE ORAL
COMMUNITY
Start: 2022-10-21 | End: 2023-02-07

## 2022-12-08 RX ADMIN — GADOBENATE DIMEGLUMINE 7 ML: 529 INJECTION, SOLUTION INTRAVENOUS at 13:07

## 2022-12-08 NOTE — PROGRESS NOTES
"NEUROSURGERY PROGRESS NOTE    Chief Complaint: Thoracic calcified lesion    Subjective: This is a 72-year-old female who I previously evaluated for a T4-5 intradural calcified lesion which was incidentally found.  She comes in today for follow-up.  Patient's been having increasing back pain, though it is much lower than location of her tumor.  She does not feel that she has had any significant weakness in her lower extremities.  She has noticed increased urinary urgency.  Overall, she is doing well and exercises regularly.    Objective    Vital Signs: Blood pressure 140/70, temperature 96.9 °F (36.1 °C), temperature source Infrared, height 147.3 cm (58\"), weight 44 kg (97 lb), not currently breastfeeding.    Physical Exam  Awake, alert and oriented x 3  Opens eyes spont  Pupils 3 mm rx bilat  Extraocular muscles intact bilaterally  Face symmetric bilaterally  Tongue midline  5/5 in her LE's bilaterally  2+ reflexes, no clonus    Current Medications:   Current Outpatient Medications:   •  buPROPion SR (WELLBUTRIN SR) 100 MG 12 hr tablet, TAKE ONE TABLET BY MOUTH EVERY AFTERNOON AS DIRECTED BY YOUR PRESCRIBER, Disp: , Rfl:   •  cholecalciferol (VITAMIN D3) 1.25 MG (41337 UT) capsule, Take 50,000 Units by mouth., Disp: , Rfl:   •  estradiol (VIVELLE-DOT) 0.1 MG/24HR patch, Place 1 patch on the skin as directed by provider 2 (Two) Times a Week., Disp: 24 patch, Rfl: 3  •  levothyroxine (Synthroid) 112 MCG tablet, Take 1 tablet by mouth Daily., Disp: 90 tablet, Rfl: 3  •  Unable to find, 1 each Every 30 (Thirty) Days. Med Name: Immunoglobulin infusion, Disp: , Rfl:   No current facility-administered medications for this visit.     Laboratory Results:                              Brief Urine Lab Results     None        Microbiology Results (last 10 days)     ** No results found for the last 240 hours. **          Diagnostic Imaging: I reviewed and independently interpreted the new imaging.     Assessment/Plan:  This is a " 72-year-old female who I previously evaluated for T4-5 intradural calcified lesion.  She comes in for follow-up.  Clinically, the patient is doing well.  She has had increased lower back pain, though I think this is unrelated to the lesion as it is not in the location of the tumor.  She has full strength on exam and no signs of upper motor neuron findings.  As such, I would favor to continue to monitor this conservatively.  We are going to have the patient return to clinic in 6 months with a new thoracic MRI.  She is cleared to participate in physical therapy and all exercises recommended by her therapist.     Diagnoses and all orders for this visit:    1. Thoracic spine tumor (Primary)  -     MRI Thoracic Spine With & Without Contrast; Future  -     Ambulatory Referral to Physical Therapy Evaluate and treat        Juan C Evans MD  12/08/22  13:59 EST

## 2022-12-10 LAB — CREAT BLDA-MCNC: 0.5 MG/DL (ref 0.6–1.3)

## 2022-12-12 ENCOUNTER — TELEPHONE (OUTPATIENT)
Dept: NEUROSURGERY | Facility: CLINIC | Age: 72
End: 2022-12-12

## 2022-12-12 NOTE — TELEPHONE ENCOUNTER
Caller: Fatmata Murdock    Relationship: Self    Best call back number: [phone]    What is the best time to reach you: ANY TIME    Who are you requesting to speak with (clinical staff, provider,  specific staff member): CLINICAL    Do you know the name of the person who called: FATMATA MURDOCK    What was the call regarding: TUMOR SIZE AND WANTS TO KNOW WHAT AREA SHE WAS EVALUATED FOR.    Do you require a callback: YES    PATIENT CALLED IN AND WANTS TO KNOW IF HER TUMOR HAS GROWN SINCE LAST TIME SHE WAS IN THE OFFICE 5/02/22.  DID NOT ASK DR. NORTON AND JUST WANTS AN UPDATE ON THE SIZE.  PATIENT ALSO WANTS TO KNOW IF SHE IS BEING SEEN FOR T4-5 OR T8-9 BOTH ARE MENTIONED IN THE NOTES SO SHE NEEDS CLARIFICATION.  PLEASE CALL PATIENT WITH ANY ADVICE.  THANK YOU!

## 2022-12-14 NOTE — TELEPHONE ENCOUNTER
I s/w Dr. Evans who reviewed the patients records and imaging once more and confirmed that the pt only has 1 lesion at the T4 level.     Due to the fact that the pt has an MRI and CT to compare, it is not exact and we are unable to determine precisely if the lesion has grown.  However, there is no obvious change in size of the lesion.      After the next MRI and follow up we will be able to better determine if the lesion is changing at that point we will compare MRI to MRI.      I called the pt to inform her - I left a message to call the office to discuss.

## 2023-01-17 ENCOUNTER — TRANSCRIBE ORDERS (OUTPATIENT)
Dept: ADMINISTRATIVE | Facility: HOSPITAL | Age: 73
End: 2023-01-17
Payer: MEDICARE

## 2023-01-17 ENCOUNTER — TELEPHONE (OUTPATIENT)
Dept: CARDIOLOGY | Facility: CLINIC | Age: 73
End: 2023-01-17
Payer: MEDICARE

## 2023-01-17 DIAGNOSIS — Z12.31 VISIT FOR SCREENING MAMMOGRAM: Primary | ICD-10-CM

## 2023-01-17 NOTE — TELEPHONE ENCOUNTER
Pt calling to see if she needs an echo before her f/u in March with [facility]. There was one ordered in Sept for severe fatigue, dizziness, and no stamina, but pt cancelled due to iron, platelets, and hemoglobin were low.     Returned pt call, no answer. LVM letting her know per PW last office note in 2/23/2022 we would repeat echo in 2 years, but if she was having any new symptoms or similar symptoms to those she reported in Sept to let us know and we will likely want to do an echo before her March appt. Will await pt return call if further questions.

## 2023-01-20 ENCOUNTER — TELEPHONE (OUTPATIENT)
Dept: NEUROSURGERY | Facility: CLINIC | Age: 73
End: 2023-01-20
Payer: MEDICARE

## 2023-01-20 ENCOUNTER — DOCUMENTATION (OUTPATIENT)
Dept: NEUROSURGERY | Facility: CLINIC | Age: 73
End: 2023-01-20
Payer: MEDICARE

## 2023-01-20 DIAGNOSIS — R51.9 NONINTRACTABLE HEADACHE, UNSPECIFIED CHRONICITY PATTERN, UNSPECIFIED HEADACHE TYPE: Primary | ICD-10-CM

## 2023-01-20 NOTE — TELEPHONE ENCOUNTER
CT has been ordered. Please schedule CT and follow up appointment with PA after CT has been completed.

## 2023-01-20 NOTE — TELEPHONE ENCOUNTER
For completeness, we can get a CT of the head to rule out subdural.  She can get that done and follow-up with a PA in the office.

## 2023-01-20 NOTE — TELEPHONE ENCOUNTER
Provider:  Cristina  Surgery/Procedure:  DAYANA  Surgery/Procedure Date:    Last visit:   12/8/22  Next visit: 5/30/23     Reason for call: Patient called about recent fall. She fell about 3 weeks ago, slipped on an area rug and hit the back of her head, she does have a history of subdural hematoma, had craniotomy at  in 2019, however does not follow up with them any longer and has been told that she needs a new referral for further services from their office. She's complaining of headaches that come and go, often waking up in the middle of the night with a headache, states they are not too severe. Has some stiffness in her left neck, and the headache she says in located in the front of her face, however she hit the back of her head when falling. Also has some blurry vision of the left eye, still blurry today and did not have prior to the fall. No weakness to report, no speech deficits other than chronic word finding she had since hematoma in 2019. Is having some balance deficits but no dizziness. No confusion or mood changes. She is not taking any blood thinners.     She saw her PCP today, no images were ordered, was told to follow up with our office. At this point I informed patient that there likely is no bleed, but she could have a some post concussive symptoms, and we will notify the provider in office today.

## 2023-01-27 ENCOUNTER — HOSPITAL ENCOUNTER (OUTPATIENT)
Dept: MAMMOGRAPHY | Facility: HOSPITAL | Age: 73
Discharge: HOME | End: 2023-01-27
Admitting: NURSE PRACTITIONER
Payer: MEDICARE

## 2023-01-27 DIAGNOSIS — Z12.31 VISIT FOR SCREENING MAMMOGRAM: ICD-10-CM

## 2023-01-27 PROCEDURE — 77067 SCR MAMMO BI INCL CAD: CPT | Performed by: RADIOLOGY

## 2023-01-27 PROCEDURE — 77063 BREAST TOMOSYNTHESIS BI: CPT | Performed by: RADIOLOGY

## 2023-01-27 PROCEDURE — 77067 SCR MAMMO BI INCL CAD: CPT

## 2023-01-27 PROCEDURE — 77063 BREAST TOMOSYNTHESIS BI: CPT

## 2023-02-06 ENCOUNTER — TELEPHONE (OUTPATIENT)
Dept: OBSTETRICS AND GYNECOLOGY | Facility: CLINIC | Age: 73
End: 2023-02-06
Payer: MEDICARE

## 2023-02-07 ENCOUNTER — HOSPITAL ENCOUNTER (OUTPATIENT)
Dept: CT IMAGING | Facility: HOSPITAL | Age: 73
Discharge: HOME | End: 2023-02-07
Admitting: PHYSICIAN ASSISTANT
Payer: MEDICARE

## 2023-02-07 ENCOUNTER — TELEPHONE (OUTPATIENT)
Dept: OBSTETRICS AND GYNECOLOGY | Facility: CLINIC | Age: 73
End: 2023-02-07
Payer: MEDICARE

## 2023-02-07 ENCOUNTER — OFFICE VISIT (OUTPATIENT)
Dept: NEUROSURGERY | Facility: CLINIC | Age: 73
End: 2023-02-07
Payer: MEDICARE

## 2023-02-07 VITALS — BODY MASS INDEX: 21.24 KG/M2 | TEMPERATURE: 97.3 F | HEIGHT: 58 IN | WEIGHT: 101.2 LBS

## 2023-02-07 DIAGNOSIS — S06.0X0A CONCUSSION WITHOUT LOSS OF CONSCIOUSNESS, INITIAL ENCOUNTER: Primary | ICD-10-CM

## 2023-02-07 DIAGNOSIS — R51.9 NONINTRACTABLE HEADACHE, UNSPECIFIED CHRONICITY PATTERN, UNSPECIFIED HEADACHE TYPE: ICD-10-CM

## 2023-02-07 PROCEDURE — 70450 CT HEAD/BRAIN W/O DYE: CPT

## 2023-02-07 PROCEDURE — 99213 OFFICE O/P EST LOW 20 MIN: CPT | Performed by: STUDENT IN AN ORGANIZED HEALTH CARE EDUCATION/TRAINING PROGRAM

## 2023-02-07 NOTE — PROGRESS NOTES
"NEUROSURGERY PROGRESS NOTE    Patient: Fatmata Murdock  : 1950    Primary Care Provider: Ruth Caceres MD    Chief Complaint: Headache    Subjective: This is a 72-year-old female who I been following for a thoracic intradural tumor.  She comes in today with a new complaint of a recent fall.  The patient fell approximately 1 month ago and landed on the back of her head on tile.  Since that time, she has been dealing with intermittent headaches as well as vision changes.  Additionally, she had trouble focusing on her work.  She denies any worsening falls or lower extremity weakness.    Objective    Vital Signs: Temperature 97.3 °F (36.3 °C), temperature source Infrared, height 147.3 cm (58\"), weight 45.9 kg (101 lb 3.2 oz), not currently breastfeeding.    Physical Exam  Awake, alert and oriented x 3  Opens eyes spont  Pupils 3 mm rx bilat  Extraocular muscles intact bilaterally  Face symmetric bilaterally  Tongue midline  5/5 in all 4 ext  No pronator drift    Current Medications:   Current Outpatient Medications:   •  cholecalciferol (VITAMIN D3) 1.25 MG (60948 UT) capsule, Take 50,000 Units by mouth., Disp: , Rfl:   •  estradiol (VIVELLE-DOT) 0.1 MG/24HR patch, Place 1 patch on the skin as directed by provider 2 (Two) Times a Week., Disp: 24 patch, Rfl: 3  •  levothyroxine (Synthroid) 112 MCG tablet, Take 1 tablet by mouth Daily., Disp: 90 tablet, Rfl: 3  •  Unable to find, 1 each Every 30 (Thirty) Days. Med Name: Immunoglobulin infusion, Disp: , Rfl:      Laboratory Results:                              Brief Urine Lab Results     None        Microbiology Results (last 10 days)     ** No results found for the last 240 hours. **          Diagnostic Imaging: I reviewed and independently interpreted the new imaging.     Assessment/Plan:  This is a 72-year-old female who I previously followed for thoracic intradural tumor.  She comes in today with a new issue of a recent fall.  The patient fell 1 month " ago and landed on the back of her head.  She has been dealing with intermittent headaches, vision changes and trouble focusing since.  She underwent a CT head which was normal.  In talking the patient, her symptoms are fairly classic for concussion.  Encouraged the patient to continue to rest, limit screen time and avoid activities that cause headaches.  I do not think that the thoracic tumor has caused any new issues as she has good strength in her lower extremities.  We are already scheduled to see her back in May with a new thoracic MRI to evaluate any changes in the tumor.    There are no diagnoses linked to this encounter.    Juan C Evans MD  02/07/23  14:43 EST

## 2023-02-07 NOTE — TELEPHONE ENCOUNTER
I have talked to Enoch again today to see why she was getting the brand name Vivelle dot patch 0.1mg for tier 1 cost of 3 dollars last year and this year it is tier 4 and costs 234.84. They said her plan changed and an appeal is not going to change the covered. We had an approval and [facility] dismissed that. She asked me to call [facility] [phone] and they are on [facility]. She does not want to wean her hormone patches and her apt is 2/17/2023 with [facility]

## 2023-02-07 NOTE — PROGRESS NOTES
Patient: Fatmata Murdock  : 1950    Primary Care Provider: Ruth Caceres MD    Requesting Provider: As above      Chief Complaint: Dizziness (S/p fall 1 month ago)      History of Present Illness: This is a 72 y.o. female who presents ***    PMHX  Allergies:  Allergies   Allergen Reactions   • Rocephin [Ceftriaxone] Nausea And Vomiting   • Doxycycline GI Intolerance   • Effexor Xr [Venlafaxine Hcl Er] Other (See Comments)     Restless legs, dermatitis    • Hydrocodone Unknown (See Comments)   • Penicillins GI Intolerance   • Sulfa Antibiotics GI Intolerance     Medications    Current Outpatient Medications:   •  cholecalciferol (VITAMIN D3) 1.25 MG (26545 UT) capsule, Take 50,000 Units by mouth., Disp: , Rfl:   •  estradiol (VIVELLE-DOT) 0.1 MG/24HR patch, Place 1 patch on the skin as directed by provider 2 (Two) Times a Week., Disp: 24 patch, Rfl: 3  •  levothyroxine (Synthroid) 112 MCG tablet, Take 1 tablet by mouth Daily., Disp: 90 tablet, Rfl: 3  •  Unable to find, 1 each Every 30 (Thirty) Days. Med Name: Immunoglobulin infusion, Disp: , Rfl:   Past Medical History:  Past Medical History:   Diagnosis Date   • Anemia May 1997    The anemia began due to ulcer.   • Anxiety    • Arthritis 2005   • Cervical disc disorder     Spine   • Depression    • Endometriosis    • Fibroid    • Fibroids     LEIOMYOMA OF UTERUS   • H/O gastric ulcer    • Hepatitis A    • Hypothyroidism    • Iron deficiency    • Leukopenia    • Migraine    • MRSA (methicillin resistant Staphylococcus aureus) 2014    No comments   • MRSA infection    • Osteopenia    • Ovarian cyst    • Pelvic pain    • Routine gynecological examination    • Thyroid condition    • Vitamin B12 deficiency    • Vitamin D deficiency      Past Surgical History:  Past Surgical History:   Procedure Laterality Date   • APPENDECTOMY     • COLONOSCOPY     • ESOPHAGUS SURGERY     • HAND SURGERY Right 2020   • HYSTERECTOMY     •  "OOPHORECTOMY      unilateral    • OTHER SURGICAL HISTORY      SUBDURAL HEMATOMA REPAIR   • VAGOTOMY AND PYLOROPLASTY  1981     Social Hx:  Social History     Tobacco Use   • Smoking status: Never   • Smokeless tobacco: Never   Vaping Use   • Vaping Use: Never used   Substance Use Topics   • Alcohol use: Never   • Drug use: Never     Family Hx:  Family History   Problem Relation Age of Onset   • Depression Mother    • No Known Problems Father    • Hypertension Sister    • Arthritis Sister    • Hypertension Sister    • Anxiety disorder Daughter    • Anxiety disorder Sister    • Breast cancer Neg Hx    • Ovarian cancer Neg Hx    • Uterine cancer Neg Hx    • Colon cancer Neg Hx    • Melanoma Neg Hx    • Prostate cancer Neg Hx    • Deep vein thrombosis Neg Hx    • Pulmonary embolism Neg Hx    • Coronary artery disease Neg Hx    • Stroke Neg Hx    • Osteoporosis Neg Hx    • Diabetes Neg Hx      Review of Systems:        Review of Systems     Physical Exam:   Temp 97.3 °F (36.3 °C) (Infrared)   Ht 147.3 cm (58\")   Wt 45.9 kg (101 lb 3.2 oz)   BMI 21.15 kg/m²   Awake, alert and oriented x 3  Speech f/c  Opens eyes spont  Pupils 3 mm rx bilaterally  Extraocular muscles intact bilaterally  Normal sensation to light touch in all 3 distributions of CN V bilaterally  Face symmetric bilaterally  Tongue midline  5/5 in all 4 ext  Normal sensation to light touch in all 4 ext  2+DTR's  No thompson's or clonus bilaterally  No pronator drift or dysmetria  Gait not assessed    Diagnostic Studies:  All neurological imaging studies were independently reviewed unless stated otherwise    Assessment/Plan:  This is a 72 y.o. female presenting ***    There are no diagnoses linked to this encounter.    Juan C Evans MD  02/07/23  14:30 EST  "

## 2023-02-08 ENCOUNTER — TELEPHONE (OUTPATIENT)
Dept: OBSTETRICS AND GYNECOLOGY | Facility: CLINIC | Age: 73
End: 2023-02-08
Payer: MEDICARE

## 2023-02-08 DIAGNOSIS — Z79.890 HORMONE REPLACEMENT THERAPY (HRT): ICD-10-CM

## 2023-02-08 RX ORDER — ESTRADIOL 0.1 MG/D
1 PATCH, EXTENDED RELEASE TRANSDERMAL 2 TIMES WEEKLY
Qty: 24 PATCH | Refills: 0 | Status: SHIPPED | OUTPATIENT
Start: 2023-02-09 | End: 2023-03-01 | Stop reason: SDUPTHER

## 2023-02-08 NOTE — TELEPHONE ENCOUNTER
Pharmacy called and wanted to know if she can substitute the generic version of the vivelle-dot. You can call Nora the pharmacist back at [phone]

## 2023-02-08 NOTE — TELEPHONE ENCOUNTER
She is going to rerun the rx for the generic of Vivelle dot patch and call the pt. That way she can tell her the cost and is she still wants to use her mail order they will forward it to [facility].

## 2023-02-08 NOTE — TELEPHONE ENCOUNTER
I left her a message that Natty approved me sending her Vivelle dot rx to [facility] and see if they can get the cost down on the the preferred patch. Pts has skin rash with the generic and has a malabsorption issue and needs brand name for it to work. The patient states she got a call that the patch is 234 for 3 months and that is unaffordable.

## 2023-02-17 ENCOUNTER — TELEPHONE (OUTPATIENT)
Dept: OBSTETRICS AND GYNECOLOGY | Facility: CLINIC | Age: 73
End: 2023-02-17

## 2023-02-17 NOTE — TELEPHONE ENCOUNTER
Caller: Fatmata Murdock    Relationship to patient: Self    Best call back number: [phone]    Patient is needing: PT UNABLE TO MAKE IT TODAY HER ROADS ARE ICY. R.S TO 3-28 BASED ON PT AVAILABILITY

## 2023-02-28 DIAGNOSIS — Z13.220 SCREENING CHOLESTEROL LEVEL: Primary | ICD-10-CM

## 2023-03-01 ENCOUNTER — LAB (OUTPATIENT)
Dept: LAB | Facility: HOSPITAL | Age: 73
End: 2023-03-01
Payer: MEDICARE

## 2023-03-01 ENCOUNTER — OFFICE VISIT (OUTPATIENT)
Dept: CARDIOLOGY | Facility: CLINIC | Age: 73
End: 2023-03-01
Payer: MEDICARE

## 2023-03-01 VITALS
OXYGEN SATURATION: 97 % | SYSTOLIC BLOOD PRESSURE: 128 MMHG | DIASTOLIC BLOOD PRESSURE: 60 MMHG | WEIGHT: 101 LBS | HEIGHT: 60 IN | BODY MASS INDEX: 19.83 KG/M2 | HEART RATE: 70 BPM

## 2023-03-01 DIAGNOSIS — Z13.220 SCREENING CHOLESTEROL LEVEL: ICD-10-CM

## 2023-03-01 DIAGNOSIS — I71.20 THORACIC AORTIC ANEURYSM WITHOUT RUPTURE, UNSPECIFIED PART: ICD-10-CM

## 2023-03-01 DIAGNOSIS — Z82.49 FAMILY HISTORY OF HEART DISEASE: ICD-10-CM

## 2023-03-01 DIAGNOSIS — I35.1 MODERATE AORTIC REGURGITATION: Primary | ICD-10-CM

## 2023-03-01 LAB
CHOLEST SERPL-MCNC: 176 MG/DL (ref 0–200)
HDLC SERPL-MCNC: 78 MG/DL (ref 40–60)
LDLC SERPL CALC-MCNC: 87 MG/DL (ref 0–100)
LDLC/HDLC SERPL: 1.12 {RATIO}
TRIGL SERPL-MCNC: 54 MG/DL (ref 0–150)
VLDLC SERPL-MCNC: 11 MG/DL (ref 5–40)

## 2023-03-01 PROCEDURE — 80061 LIPID PANEL: CPT

## 2023-03-01 PROCEDURE — 99213 OFFICE O/P EST LOW 20 MIN: CPT | Performed by: INTERNAL MEDICINE

## 2023-03-01 RX ORDER — BUPROPION HYDROCHLORIDE 150 MG/1
150 TABLET, EXTENDED RELEASE ORAL DAILY
COMMUNITY

## 2023-03-01 NOTE — PROGRESS NOTES
[facility] Cardiology    Patient ID: Fatmata Murdock is a 72 y.o. female.  : 1950   Contact: [phone]    Encounter date: 2023    PCP: Ruth Caceres MD   Neurosurgeon: Kenneth Evans MD      Chief complaint:   Chief Complaint   Patient presents with   • Family history of heart disease       Problem List:  1. Aortic aneurysm  a. CT scan for coronary artery calcification 2020 revealed an enlargement of the ascending aorta up to 4.1 cm.  The coronary artery calcium score was 4.  b. Echocardiogram, 2021: EF 60%. Moderate dilation on ascending aorta. Aortic size index 2.64. Moderate AR.   c. Echocardiogram, 2022: EF 60%. Aortic root dilation 3.8 cm. Moderate AR.  Aortic size index 2.8.  d. CT scan of the chest with contrast 3/15/2022 revealed a 4 cm a sending aorta at the level of the main pulmonary artery.  (Also noted is a 12 x 9 x 5 mm calcified right paracentral structure at the T4-5 level which could be a calcified chronic disc excreted extrusion or meningioma.)  2. Aortic regurgitation   3. Family history of CAD  4. Osteoporosis  a. Dr. Guo  5.  Thoracic intradural tumor  a. Followed by Dr. Evans  b. CT scan of the chest with contrast 3/15/2022 revealed a 4 cm a sending aorta at the level of the main pulmonary artery.  (Also noted is a 12 x 9 x 5 mm calcified right paracentral structure at the T4-5 level which could be a calcified chronic disc excreted extrusion or meningioma.  c. MRI thoracic spine, 2022; 14 x 10 x 10 mm extradural enhancing intradural extra medullary finding at the level of T4, favoring meningioma, versus possible schwannoma given mild adjacent involvement of the right neural foramen. The adjacent thoracic spinal cord is effaced and displaced posteriorly, otherwise without focal signal abnormality concerning for cord edema.   6. Hypothyroidism  7. Right subdural hematoma, s/p two shayy hole washout, 19,  [facility]  8. Hypogammaglobulinemia  a. Followed by Dr. Roge garces IgG infusions monthly  9. S/p vagotomy for duodenal ulcer    Allergies   Allergen Reactions   • Rocephin [Ceftriaxone] Nausea And Vomiting   • Doxycycline GI Intolerance   • Effexor Xr [Venlafaxine Hcl Er] Other (See Comments)     Restless legs, dermatitis    • Hydrocodone Unknown (See Comments)   • Penicillins GI Intolerance   • Sulfa Antibiotics GI Intolerance       Current Medications:    Current Outpatient Medications:   •  buPROPion SR (WELLBUTRIN SR) 150 MG 12 hr tablet, Take 1 tablet by mouth Daily., Disp: , Rfl:   •  cholecalciferol (VITAMIN D3) 1.25 MG (11318 UT) capsule, Take 1 capsule by mouth Every 14 (Fourteen) Days., Disp: , Rfl:   •  estradiol (VIVELLE-DOT) 0.1 MG/24HR patch, Place 1 patch on the skin as directed by provider 2 (Two) Times a Week., Disp: 24 patch, Rfl: 3  •  levothyroxine (Synthroid) 112 MCG tablet, Take 1 tablet by mouth Daily., Disp: 90 tablet, Rfl: 3  •  Unable to find, 1 each Every 30 (Thirty) Days. Med Name: Immunoglobulin infusion, Disp: , Rfl:     HPI    Fatmata Murdock is a 72 y.o. female who presents today for an annual follow up of family history of heart disease and aortic aneurysm. Since last visit, the patient has been doing well overall from a cardiovascular standpoint. Patient maintains a stable activity level. She did just graduate with her Masters degree in mental health and social work. Patient does monitor their blood pressure with readings ranging around 113-120 mmHg systolic. Patient does not have a regular exercise routine due to discomfort in her back. Since last visit, patient has been diagnosed with a thoracic intradural tumor. She does follow up with Dr. Evans. She is wanting to follow up with a orthopedist for scoliosis and a gastrologist for a routine colonoscopy and other GI testing that may be needed. Patient denies chest pain, shortness of breath, orthopnea, palpitations, edema, dizziness,  "and syncope.      The following portions of the patient's history were reviewed and updated as appropriate: allergies, current medications and problem list.    Pertinent positives as listed in the HPI.  All other systems reviewed are negative.         Vitals:    03/01/23 1310   BP: 128/60   BP Location: Left arm   Patient Position: Sitting   Pulse: 70   SpO2: 97%   Weight: 45.8 kg (101 lb)   Height: 152.4 cm (60\")       Physical Exam:  General: Alert and oriented.  Neck: Jugular venous pressure is within normal limits. Carotids have normal upstrokes without bruits.   Cardiovascular: Heart has a nondisplaced focal PMI. Regular rate and rhythm. No gallop or rub. 2-3/6 DM lower RSB  Lungs: Clear, no rales or wheezes. Equal expansion is noted.   Extremities: Show no edema.  Skin: Warm and dry.  Neurologic: Nonfocal.     Diagnostic Data (reviewed with patient):    Lab date: 02/03/2023  • BMP: Glu 83, BUN 25, Creat 0.52, eGFR >60, Na 139, K 3.9, Cl 107, CO2 29, Ca 8.8  • CBC: WBC 7.4, RBC 4.55, HGB 14.6, HCT 44.4, MCV 98, MCH 32.1,      Lab Results   Component Value Date    TSH 0.211 (L) 06/23/2022        Procedures      Assessment:    ICD-10-CM ICD-9-CM   1. Moderate aortic regurgitation  I35.1 424.1   2. Thoracic aortic aneurysm without rupture, unspecified part  I71.20 441.2   3. Family history of heart disease  Z82.49 V17.49         Plan:  1. Echocardiogram ordered to reassess heart and valve anatomy due to aortic regurgitation.    2. Continue all other current medications.  3. F/up in 6 months, sooner if needed.    Scribed for Blacnhe Velasco MD by Sherri Sen. 3/1/2023 13:48 EST         I Blanche Velasco MD personally performed the services described in this documentation as scribed by the above individual in my presence, and it is both accurate and complete.    Blanche Velasco MD, FACC              "

## 2023-03-06 ENCOUNTER — PATIENT MESSAGE (OUTPATIENT)
Dept: CARDIOLOGY | Facility: CLINIC | Age: 73
End: 2023-03-06
Payer: MEDICARE

## 2023-03-06 DIAGNOSIS — Z98.890 H/O VAGOTOMY: Primary | ICD-10-CM

## 2023-03-28 ENCOUNTER — OFFICE VISIT (OUTPATIENT)
Dept: OBSTETRICS AND GYNECOLOGY | Facility: CLINIC | Age: 73
End: 2023-03-28
Payer: MEDICARE

## 2023-03-28 VITALS
WEIGHT: 103 LBS | DIASTOLIC BLOOD PRESSURE: 68 MMHG | SYSTOLIC BLOOD PRESSURE: 150 MMHG | BODY MASS INDEX: 20.22 KG/M2 | HEIGHT: 60 IN

## 2023-03-28 DIAGNOSIS — N39.3 SUI (STRESS URINARY INCONTINENCE, FEMALE): ICD-10-CM

## 2023-03-28 DIAGNOSIS — N95.2 VAGINAL ATROPHY: ICD-10-CM

## 2023-03-28 DIAGNOSIS — Z78.0 ASYMPTOMATIC POSTMENOPAUSAL STATE: Primary | ICD-10-CM

## 2023-03-28 DIAGNOSIS — Z12.31 BREAST CANCER SCREENING BY MAMMOGRAM: ICD-10-CM

## 2023-03-28 DIAGNOSIS — Z79.890 HORMONE REPLACEMENT THERAPY (HRT): ICD-10-CM

## 2023-03-28 RX ORDER — ESTRADIOL 0.1 MG/D
1 FILM, EXTENDED RELEASE TRANSDERMAL 2 TIMES WEEKLY
Qty: 24 PATCH | Refills: 3 | Status: SHIPPED | OUTPATIENT
Start: 2023-03-30

## 2023-03-28 RX ORDER — ESTRADIOL 0.1 MG/D
1 FILM, EXTENDED RELEASE TRANSDERMAL 2 TIMES WEEKLY
Qty: 24 PATCH | Refills: 3 | Status: CANCELLED | OUTPATIENT
Start: 2023-03-30

## 2023-03-28 NOTE — PROGRESS NOTES
Postmenopausal visit    Chief Complaint   Patient presents with   • Med Refill        Subjective     HPI  Fatmata Murdock is a 72 y.o. female, , who presents as a(n) established patient. She is s/p TLH.  Patient reports problems with: insomnia. Pt. reports mild urinary incontinence with cough/sneeze. There were no changes to her medical or surgical history since her last visit.. Partner Status: Marital Status: single.  She is not currently sexually active. STD testing recommendations have been explained to the patient and she does not desire STD testing.    Additional OB/GYN History     On HRT? Yes. Details: estrogen patch- Patches have been causes a reaction x 2 months as she has not been using vivelle due to insurance.     Last Pap : 10/23/2020. Results: negative. HPV: not done    Last Completed Pap Smear     This patient has no relevant Health Maintenance data.        History of abnormal Pap smear: no  Family history of uterine, colon, breast, or ovarian cancer: no  Performs monthly Self-Breast Exam: no  Last mammogram: 23. Done at [facility].    Last Completed Mammogram          Ordered - MAMMOGRAM (Every 2 Years) Ordered on 3/28/2023    2023  Mammo Screening Digital Tomosynthesis Bilateral With CAD    2022  Mammo Diagnostic Left With CAD    2021  Mammo Screening Digital Tomosynthesis Bilateral With CAD    2020  Mammo Screening Digital Tomosynthesis Bilateral With CAD              Last colonoscopy: up to date    Last Completed Colonoscopy     This patient has no relevant Health Maintenance data.        Last DEXA: 3/13/23- osteoporosis   Exercises Regularly: yes  Feelings of Anxiety or Depression: Managed      Tobacco Usage?: No       Current Outpatient Medications:   •  buPROPion SR (WELLBUTRIN SR) 150 MG 12 hr tablet, Take 1 tablet by mouth Daily., Disp: , Rfl:   •  cholecalciferol (VITAMIN D3) 1.25 MG (70323 UT) capsule, Take 1 capsule by mouth Every 14  (Fourteen) Days., Disp: , Rfl:   •  [START ON 3/30/2023] estradiol (VIVELLE-DOT) 0.1 MG/24HR patch, Place 1 patch on the skin as directed by provider 2 (Two) Times a Week., Disp: 24 patch, Rfl: 3  •  levothyroxine (Synthroid) 112 MCG tablet, Take 1 tablet by mouth Daily., Disp: 90 tablet, Rfl: 3  •  Unable to find, 1 each Every 30 (Thirty) Days. Med Name: Immunoglobulin infusion, Disp: , Rfl:     Patient is requesting refills of estradiol patches.    OB History        14    Para   1    Term   1            AB   13    Living           SAB   11    IAB   2    Ectopic        Molar        Multiple        Live Births                    Past Medical History:   Diagnosis Date   • Anemia May 1997    The anemia began due to ulcer.   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   • Hepatitis A    • Hypothyroidism    • Iron deficiency    • Leukopenia    • Migraine    • MRSA (methicillin resistant Staphylococcus aureus) 2014    No comments   • MRSA infection    • Osteopenia    • Ovarian cyst    • Pelvic pain    • Routine gynecological examination    • Thyroid condition    • Vitamin B12 deficiency    • Vitamin D deficiency         Past Surgical History:   Procedure Laterality Date   • APPENDECTOMY     • COLONOSCOPY     • ESOPHAGUS SURGERY     • HAND SURGERY Right 2020   • HYSTERECTOMY     • OOPHORECTOMY      unilateral    • OTHER SURGICAL HISTORY      SUBDURAL HEMATOMA REPAIR   • VAGOTOMY AND PYLOROPLASTY         Health Maintenance   Topic Date Due   • COLORECTAL CANCER SCREENING  Never done   • TDAP/TD VACCINES (1 - Tdap) Never done   • ZOSTER VACCINE (1 of 2) Never done   • Pneumococcal Vaccine 65+ (1 - PCV) Never done   • HEPATITIS C SCREENING  Never done   • ANNUAL  "WELLNESS VISIT  Never done   • COVID-19 Vaccine (3 - Booster for Pfizer series) 06/17/2021   • INFLUENZA VACCINE  Never done   • MAMMOGRAM  01/27/2025   • DXA SCAN  03/13/2025       The additional following portions of the patient's history were reviewed and updated as appropriate: allergies, current medications, past family history, past medical history, past social history, past surgical history and problem list.    Review of Systems   Constitutional: Negative.    HENT: Negative.    Eyes: Negative.    Respiratory: Negative.    Cardiovascular: Negative.    Gastrointestinal: Negative.    Endocrine: Negative.    Genitourinary: Positive for urinary incontinence.   Musculoskeletal: Negative.    Skin: Negative.         Mole under left breast, and right groin   Allergic/Immunologic: Negative.    Neurological: Negative.    Hematological: Negative.    Psychiatric/Behavioral: Negative.          I have reviewed and agree with the HPI, ROS, and historical information as entered above. Natty Clay, APRN       Objective   /68   Ht 152.4 cm (60\")   Wt 46.7 kg (103 lb)   BMI 20.12 kg/m²     Physical Exam  Vitals and nursing note reviewed. Exam conducted with a chaperone present.   Constitutional:       General: She is not in acute distress.     Appearance: Normal appearance. She is well-developed. She is not ill-appearing.   Neck:      Thyroid: No thyroid mass or thyromegaly.   Pulmonary:      Effort: Pulmonary effort is normal. No respiratory distress or retractions.   Chest:      Chest wall: No mass.   Breasts:     Right: Normal. No mass, nipple discharge, skin change or tenderness.      Left: Normal. No mass, nipple discharge, skin change or tenderness.   Abdominal:      General: There is no distension.      Palpations: Abdomen is soft. Abdomen is not rigid. There is no mass.      Tenderness: There is no abdominal tenderness. There is no guarding or rebound.      Hernia: No hernia is present. There is no hernia in " the left inguinal area.   Genitourinary:     General: Normal vulva.      Labia:         Right: No rash, tenderness or lesion.         Left: No rash, tenderness or lesion.       Vagina: Normal. No vaginal discharge or lesions.      Uterus: Absent.       Adnexa: Right adnexa normal and left adnexa normal.        Right: No mass or tenderness.          Left: No mass or tenderness.        Rectum: No external hemorrhoid.      Comments: Vaginal atrophy  Musculoskeletal:      Cervical back: No muscular tenderness.   Skin:     General: Skin is warm and dry.   Neurological:      Mental Status: She is alert and oriented to person, place, and time.   Psychiatric:         Mood and Affect: Mood normal.         Behavior: Behavior normal.         Assessment and Plan         Problem List Items Addressed This Visit    None  Visit Diagnoses     Asymptomatic postmenopausal state    -  Primary    Hormone replacement therapy (HRT)        Relevant Medications    estradiol (VIVELLE-DOT) 0.1 MG/24HR patch (Start on 3/30/2023)    Breast cancer screening by mammogram        Relevant Orders    Mammo Screening Digital Tomosynthesis Bilateral With CAD    Vaginal atrophy        DARION (stress urinary incontinence, female)        Relevant Orders    Ambulatory Referral to Physical Therapy Pelvic Floor (Completed)          1. Reviewed monthly self breast exams.  Instructed to call with lumps, pain, or breast discharge.  Yearly mammograms ordered.  2. Ordered mammogram today.  3. Recommended use of Vitamin D and getting adequate calcium in her diet. (1500mg)  4. Reviewed exercise as a preventative health measures.   5. Colonoscopy recommended.  6. Reviewed risks of ERT including increased risk of breast cancer, increased blood clots, increased heart disease.  Patient strongly desires to stay on or start ERT.  She understands we will use the lowest amount that adequately controls her symptoms.  7. Recommended Flu Vaccine in Fall of each  year.  8. Referral made for Pelvic Floor Physical Therapy  9. RTC in 1 year or PRN with problems.  Return in about 1 year (around 3/28/2024) for Annual physical.   11. She strongly declines stopping ERT.      Natty Clay, APRN  03/28/2023

## 2023-04-10 ENCOUNTER — TELEPHONE (OUTPATIENT)
Dept: CARDIOLOGY | Facility: CLINIC | Age: 73
End: 2023-04-10
Payer: MEDICARE

## 2023-04-13 NOTE — TELEPHONE ENCOUNTER
"Reviewed with Dr. Velasco- \"will probably be ok. Follow BP and HR closely. Will need to repeat echo in October\".     Call me if she decides to start medication so that I can order f/u echo. Lm on  with information  "

## 2023-04-19 ENCOUNTER — HOSPITAL ENCOUNTER (OUTPATIENT)
Dept: CARDIOLOGY | Facility: HOSPITAL | Age: 73
Discharge: HOME | End: 2023-04-19
Admitting: INTERNAL MEDICINE
Payer: MEDICARE

## 2023-04-19 VITALS — HEIGHT: 59 IN | WEIGHT: 103 LBS | BODY MASS INDEX: 20.76 KG/M2

## 2023-04-19 DIAGNOSIS — Z82.49 FAMILY HISTORY OF HEART DISEASE: ICD-10-CM

## 2023-04-19 DIAGNOSIS — I71.20 THORACIC AORTIC ANEURYSM WITHOUT RUPTURE, UNSPECIFIED PART: ICD-10-CM

## 2023-04-19 DIAGNOSIS — I35.1 MODERATE AORTIC REGURGITATION: ICD-10-CM

## 2023-04-19 LAB
ASCENDING AORTA: 4.1 CM
BH CV ECHO MEAS - AI P1/2T: 396.5 MSEC
BH CV ECHO MEAS - AO MAX PG: 17 MMHG
BH CV ECHO MEAS - AO MEAN PG: 7 MMHG
BH CV ECHO MEAS - AO ROOT AREA (BSA CORRECTED): 2.2 CM2
BH CV ECHO MEAS - AO ROOT DIAM: 3.1 CM
BH CV ECHO MEAS - AO V2 MAX: 206 CM/SEC
BH CV ECHO MEAS - AO V2 VTI: 39.4 CM
BH CV ECHO MEAS - AVA(I,D): 2.06 CM2
BH CV ECHO MEAS - EDV(CUBED): 91.1 ML
BH CV ECHO MEAS - EDV(MOD-SP4): 130 ML
BH CV ECHO MEAS - EF(MOD-BP): 56 %
BH CV ECHO MEAS - EF(MOD-SP4): 55.7 %
BH CV ECHO MEAS - ESV(CUBED): 13.8 ML
BH CV ECHO MEAS - ESV(MOD-SP4): 57.6 ML
BH CV ECHO MEAS - FS: 46.7 %
BH CV ECHO MEAS - IVS/LVPW: 1 CM
BH CV ECHO MEAS - IVSD: 0.8 CM
BH CV ECHO MEAS - LA DIMENSION: 4.2 CM
BH CV ECHO MEAS - LAT PEAK E' VEL: 9 CM/SEC
BH CV ECHO MEAS - LV MASS(C)D: 113.6 GRAMS
BH CV ECHO MEAS - LV MAX PG: 5.8 MMHG
BH CV ECHO MEAS - LV MEAN PG: 3 MMHG
BH CV ECHO MEAS - LV V1 MAX: 120 CM/SEC
BH CV ECHO MEAS - LV V1 VTI: 25.8 CM
BH CV ECHO MEAS - LVIDD: 4.5 CM
BH CV ECHO MEAS - LVIDS: 2.4 CM
BH CV ECHO MEAS - LVOT AREA: 3.1 CM2
BH CV ECHO MEAS - LVOT DIAM: 2 CM
BH CV ECHO MEAS - LVPWD: 0.8 CM
BH CV ECHO MEAS - MED PEAK E' VEL: 7.3 CM/SEC
BH CV ECHO MEAS - MV A MAX VEL: 84.6 CM/SEC
BH CV ECHO MEAS - MV DEC SLOPE: 315 CM/SEC2
BH CV ECHO MEAS - MV DEC TIME: 0.22 MSEC
BH CV ECHO MEAS - MV E MAX VEL: 33.9 CM/SEC
BH CV ECHO MEAS - MV E/A: 0.4
BH CV ECHO MEAS - MV MAX PG: 3.4 MMHG
BH CV ECHO MEAS - MV MEAN PG: 1 MMHG
BH CV ECHO MEAS - MV P1/2T: 91.2 MSEC
BH CV ECHO MEAS - MV V2 VTI: 28.5 CM
BH CV ECHO MEAS - MVA(P1/2T): 2.41 CM2
BH CV ECHO MEAS - MVA(VTI): 2.8 CM2
BH CV ECHO MEAS - PA ACC TIME: 0.11 SEC
BH CV ECHO MEAS - PA PR(ACCEL): 31.5 MMHG
BH CV ECHO MEAS - PA V2 MAX: 82.2 CM/SEC
BH CV ECHO MEAS - PI END-D VEL: 96.1 CM/SEC
BH CV ECHO MEAS - RAP SYSTOLE: 3 MMHG
BH CV ECHO MEAS - RVSP: 19 MMHG
BH CV ECHO MEAS - SV(LVOT): 81.1 ML
BH CV ECHO MEAS - SV(MOD-SP4): 72.4 ML
BH CV ECHO MEAS - TAPSE (>1.6): 2.5 CM
BH CV ECHO MEAS - TR MAX PG: 16.4 MMHG
BH CV ECHO MEAS - TR MAX VEL: 202 CM/SEC
BH CV ECHO MEASUREMENTS AVERAGE E/E' RATIO: 4.16
BH CV VAS BP RIGHT ARM: NORMAL MMHG
BH CV XLRA - RV BASE: 3.4 CM
BH CV XLRA - RV LENGTH: 7.4 CM
BH CV XLRA - RV MID: 1.9 CM
BH CV XLRA - TDI S': 16 CM/SEC
IVRT: 153 MSEC
LEFT ATRIUM VOLUME INDEX: 41.1 ML/M2
LV EF 2D ECHO EST: 55 %
MAXIMAL PREDICTED HEART RATE: 149 BPM
STRESS TARGET HR: 127 BPM

## 2023-04-19 PROCEDURE — 93306 TTE W/DOPPLER COMPLETE: CPT | Performed by: INTERNAL MEDICINE

## 2023-04-19 PROCEDURE — 93306 TTE W/DOPPLER COMPLETE: CPT

## 2023-04-26 DIAGNOSIS — I34.0 NONRHEUMATIC MITRAL VALVE REGURGITATION: ICD-10-CM

## 2023-04-26 DIAGNOSIS — I71.21 ANEURYSM OF ASCENDING AORTA WITHOUT RUPTURE: Primary | ICD-10-CM

## 2023-05-04 ENCOUNTER — OFFICE VISIT (OUTPATIENT)
Dept: GASTROENTEROLOGY | Facility: CLINIC | Age: 73
End: 2023-05-04
Payer: MEDICARE

## 2023-05-04 VITALS
TEMPERATURE: 97.7 F | SYSTOLIC BLOOD PRESSURE: 122 MMHG | OXYGEN SATURATION: 99 % | BODY MASS INDEX: 20.76 KG/M2 | WEIGHT: 103 LBS | HEART RATE: 68 BPM | DIASTOLIC BLOOD PRESSURE: 78 MMHG

## 2023-05-04 DIAGNOSIS — R10.9 CHRONIC ABDOMINAL PAIN: ICD-10-CM

## 2023-05-04 DIAGNOSIS — K59.00 CONSTIPATION, UNSPECIFIED CONSTIPATION TYPE: Primary | ICD-10-CM

## 2023-05-04 DIAGNOSIS — G89.29 CHRONIC ABDOMINAL PAIN: ICD-10-CM

## 2023-05-04 PROCEDURE — 99204 OFFICE O/P NEW MOD 45 MIN: CPT | Performed by: INTERNAL MEDICINE

## 2023-05-04 NOTE — PROGRESS NOTES
PCP: Ruth Caceres MD    No chief complaint on file.   cc: duodenum hurts    History of Present Illness:   Fatmata Murdock is a 73 y.o. female who presents to the GI clinic as a consultation for ruq pain. H/o vagotomy for duodenal ulcer > 30 years ago. Denies melena or gib loss.  Has daily ruq pain.  No fever. Overall feeling well.  Has hoarse voice. Denies heartburn. Noticing not emptying bowels as much. Requires 2 day bowel prep.    Past Medical History:   Diagnosis Date   • Anemia May 1997    The anemia began due to ulcer.   • Anxiety    • Arthritis June 2005   • Cervical disc disorder 2020    Spine   • Depression    • Endometriosis    • Fibroid    • Fibroids     LEIOMYOMA OF UTERUS   • H/O gastric ulcer    • Heart valve disease 2021     diagnosed regeration from the aerotic valve   • Hepatitis A    • Hypothyroidism    • Iron deficiency    • Leukopenia    • Migraine    • MRSA (methicillin resistant Staphylococcus aureus) September 2014    No comments   • MRSA infection    • Osteopenia    • Ovarian cyst    • Pelvic pain    • Routine gynecological examination    • Thyroid condition    • Vitamin B12 deficiency    • Vitamin D deficiency        Past Surgical History:   Procedure Laterality Date   • APPENDECTOMY     • COLONOSCOPY     • ESOPHAGUS SURGERY     • HAND SURGERY Right 03/2020   • HYSTERECTOMY     • OOPHORECTOMY      unilateral    • OTHER SURGICAL HISTORY      SUBDURAL HEMATOMA REPAIR   • VAGOTOMY AND PYLOROPLASTY  1981         Current Outpatient Medications:   •  buPROPion SR (WELLBUTRIN SR) 150 MG 12 hr tablet, Take 1 tablet by mouth Daily., Disp: , Rfl:   •  cholecalciferol (VITAMIN D3) 1.25 MG (04690 UT) capsule, Take 1 capsule by mouth Every 14 (Fourteen) Days., Disp: , Rfl:   •  estradiol (VIVELLE-DOT) 0.1 MG/24HR patch, Place 1 patch on the skin as directed by provider 2 (Two) Times a Week., Disp: 24 patch, Rfl: 3  •  levothyroxine (Synthroid) 112 MCG tablet, Take 1 tablet by  mouth Daily., Disp: 90 tablet, Rfl: 3  •  Unable to find, 1 each Every 30 (Thirty) Days. Med Name: Immunoglobulin infusion, Disp: , Rfl:     Allergies   Allergen Reactions   • Rocephin [Ceftriaxone] Nausea And Vomiting   • Doxycycline GI Intolerance   • Effexor Xr [Venlafaxine Hcl Er] Other (See Comments)     Restless legs, dermatitis    • Hydrocodone Unknown (See Comments)   • Penicillins GI Intolerance   • Sulfa Antibiotics GI Intolerance       Family History   Problem Relation Age of Onset   • Depression Mother    • No Known Problems Father    • Hypertension Sister    • Arthritis Sister    • Hypertension Sister    • Anxiety disorder Daughter    • Anxiety disorder Sister    • Breast cancer Neg Hx    • Ovarian cancer Neg Hx    • Uterine cancer Neg Hx    • Colon cancer Neg Hx    • Melanoma Neg Hx    • Prostate cancer Neg Hx    • Deep vein thrombosis Neg Hx    • Pulmonary embolism Neg Hx    • Coronary artery disease Neg Hx    • Stroke Neg Hx    • Osteoporosis Neg Hx    • Diabetes Neg Hx        Social History     Socioeconomic History   • Marital status: Single   Tobacco Use   • Smoking status: Never   • Smokeless tobacco: Never   Vaping Use   • Vaping Use: Never used   Substance and Sexual Activity   • Alcohol use: Never   • Drug use: Never   • Sexual activity: Not Currently     Partners: Male     Birth control/protection: Surgical, Post-menopausal, Hysterectomy     Comment: Hysterectomy       Review of Systems  All other systems reviewed and are negative.    Vitals:    05/04/23 1453   BP: 122/78   Pulse: 68   Temp: 97.7 °F (36.5 °C)   SpO2: 99%       Physical Exam  General Appearance:  Vitals as above. no acute distress  Head/face:  Normocephalic, atraumatic  Eyes:   EOMI, no conjunctivitis or icterus   Nose/Sinuses:  Nares patent bilaterally without discharge or lesions  Mouth/Throat:  Normal oral movements without dyskinesia  Neck:  trachea is midline, no thyromegaly  Lungs:  Normal work of breathing effort, no  overt rales  Heart:  No overt JVD or type VI murmur  Abdomen:  Nondistended, no guarding or rebound tenderness, midline scar  Neurologic:  Alert; no focal deficits; age appropriate behavior and speech  Psychiatric: mood and affect are congruent  Vascular: extremities without edema  Skin: no rash or cyanosis.      Assessment/Plan  1.) h/o vagotomy for duodenal ulcer  2.) h/o PUD  3.) chronic ruq pain  Due to persistent discomfort in ruq, recommend egd. Also, if she had a vagotomy, uncooked vegetables which is mainly what she is eating, may contribute to delayed emptying.      4.) Constipation  Recommend to start mag oxide daily  2 weeks prior to colonoscopy: start miralax 1 cap with glass of water    5.) Hoarseness  Will perform egd for gerd. Recommend pcp to consider referral to ent    6.) HCM  Screening colonoscopy    Efrain Iraheta MD  5/4/2023

## 2023-05-30 ENCOUNTER — OFFICE VISIT (OUTPATIENT)
Dept: NEUROSURGERY | Facility: CLINIC | Age: 73
End: 2023-05-30

## 2023-05-30 ENCOUNTER — HOSPITAL ENCOUNTER (OUTPATIENT)
Dept: MRI IMAGING | Facility: HOSPITAL | Age: 73
Discharge: HOME | End: 2023-05-30
Admitting: STUDENT IN AN ORGANIZED HEALTH CARE EDUCATION/TRAINING PROGRAM

## 2023-05-30 VITALS — HEIGHT: 69 IN | TEMPERATURE: 97.3 F | WEIGHT: 103 LBS | BODY MASS INDEX: 15.26 KG/M2

## 2023-05-30 DIAGNOSIS — D49.2 THORACIC SPINE TUMOR: ICD-10-CM

## 2023-05-30 DIAGNOSIS — D49.2 THORACIC SPINE TUMOR: Primary | ICD-10-CM

## 2023-05-30 PROCEDURE — A9577 INJ MULTIHANCE: HCPCS | Performed by: STUDENT IN AN ORGANIZED HEALTH CARE EDUCATION/TRAINING PROGRAM

## 2023-05-30 PROCEDURE — 0 GADOBENATE DIMEGLUMINE 529 MG/ML SOLUTION: Performed by: STUDENT IN AN ORGANIZED HEALTH CARE EDUCATION/TRAINING PROGRAM

## 2023-05-30 PROCEDURE — 82565 ASSAY OF CREATININE: CPT

## 2023-05-30 PROCEDURE — 72157 MRI CHEST SPINE W/O & W/DYE: CPT

## 2023-05-30 RX ORDER — CEFUROXIME AXETIL 500 MG/1
500 TABLET ORAL 2 TIMES DAILY
COMMUNITY

## 2023-05-30 RX ADMIN — GADOBENATE DIMEGLUMINE 10 ML: 529 INJECTION, SOLUTION INTRAVENOUS at 12:08

## 2023-05-30 NOTE — PROGRESS NOTES
"NEUROSURGERY PROGRESS NOTE    Patient: Fatmata Murdock  : 1950    Primary Care Provider: Ruth Caceres MD    Chief Complaint: Thoracic intradural lesion.    Subjective: This is a 73-year-old female who I been following for an intradural thoracic lesion, likely schwannoma or meningioma.  She comes in today for follow-up with a new thoracic MRI.  Clinically, the patient has been doing well.  She denies any new issues with lower extremity weakness or balance problems.  She speed walks several miles a day and is feeling well.    Objective    Vital Signs: Temperature 97.3 °F (36.3 °C), temperature source Infrared, height 175.3 cm (69\"), weight 46.7 kg (103 lb), not currently breastfeeding.    Physical Exam  Awake, alert and oriented x 3  Opens eyes spont  Pupils 3 mm rx bilat  Extraocular muscles intact bilaterally  Face symmetric bilaterally  Tongue midline  5/5 in all 4 ext  1+ DTR    Current Medications:   Current Outpatient Medications:   •  buPROPion SR (WELLBUTRIN SR) 150 MG 12 hr tablet, Take 1 tablet by mouth Daily., Disp: , Rfl:   •  cefuroxime (CEFTIN) 500 MG tablet, Take 1 tablet by mouth 2 (Two) Times a Day., Disp: , Rfl:   •  cholecalciferol (VITAMIN D3) 1.25 MG (90708 UT) capsule, Take 1 capsule by mouth Every 14 (Fourteen) Days., Disp: , Rfl:   •  estradiol (VIVELLE-DOT) 0.1 MG/24HR patch, Place 1 patch on the skin as directed by provider 2 (Two) Times a Week., Disp: 24 patch, Rfl: 3  •  levothyroxine (Synthroid) 112 MCG tablet, Take 1 tablet by mouth Daily., Disp: 90 tablet, Rfl: 3  •  Unable to find, 1 each Every 30 (Thirty) Days. Med Name: Immunoglobulin infusion, Disp: , Rfl:   No current facility-administered medications for this visit.     Laboratory Results:                              Brief Urine Lab Results     None        Microbiology Results (last 10 days)     ** No results found for the last 240 hours. **          Diagnostic Imaging: I reviewed and independently interpreted " the new imaging.     Assessment/Plan:  This is a 73-year-old female who I have been following for a thoracic intradural lesion, likely schwannoma or meningioma.  She is here today for follow-up and doing well.  She continues to be neurologically intact without having any symptoms related to this lesion.  Her repeat MRI shows stable size of lesion.  Because of these findings, we will continue to monitor this conservatively.  I would like to have the patient return to clinic in 1 year with a new thoracic MRI.    Diagnoses and all orders for this visit:    1. Thoracic spine tumor (Primary)  -     MRI Thoracic Spine With & Without Contrast; Future        Juan C Evans MD  05/30/23  13:43 EDT

## 2023-06-01 LAB — CREAT BLDA-MCNC: 0.6 MG/DL (ref 0.6–1.3)

## 2023-07-05 PROCEDURE — 88342 IMHCHEM/IMCYTCHM 1ST ANTB: CPT

## 2023-07-05 PROCEDURE — 88305 TISSUE EXAM BY PATHOLOGIST: CPT

## 2023-07-06 ENCOUNTER — LAB REQUISITION (OUTPATIENT)
Dept: LAB | Facility: HOSPITAL | Age: 73
End: 2023-07-06
Payer: MEDICARE

## 2023-07-06 DIAGNOSIS — K31.89 OTHER DISEASES OF STOMACH AND DUODENUM: ICD-10-CM

## 2023-07-06 DIAGNOSIS — K22.89 OTHER SPECIFIED DISEASE OF ESOPHAGUS: ICD-10-CM

## 2023-07-10 LAB — REF LAB TEST METHOD: NORMAL

## 2023-07-21 ENCOUNTER — TELEPHONE (OUTPATIENT)
Dept: ENDOCRINOLOGY | Facility: CLINIC | Age: 73
End: 2023-07-21

## 2023-07-21 NOTE — TELEPHONE ENCOUNTER
Caller: Fatmata Murdock    Relationship to patient: Self    Best call back number: [phone]    Patient is needing: PT HAD TO MOVE APPT TO NOV. WANTS TO KNOW IF SHE CAN HAVE BLOOD WORK DONE BEFORE THEN.

## 2023-07-25 ENCOUNTER — LAB (OUTPATIENT)
Dept: LAB | Facility: HOSPITAL | Age: 73
End: 2023-07-25
Payer: MEDICARE

## 2023-07-25 DIAGNOSIS — E03.9 ACQUIRED HYPOTHYROIDISM: ICD-10-CM

## 2023-07-25 LAB — TSH SERPL DL<=0.05 MIU/L-ACNC: 0.05 UIU/ML (ref 0.27–4.2)

## 2023-07-25 PROCEDURE — 84443 ASSAY THYROID STIM HORMONE: CPT

## 2023-08-04 ENCOUNTER — TELEPHONE (OUTPATIENT)
Dept: CARDIOLOGY | Facility: CLINIC | Age: 73
End: 2023-08-04
Payer: MEDICARE

## 2023-08-04 ENCOUNTER — OFFICE VISIT (OUTPATIENT)
Dept: ENDOCRINOLOGY | Facility: CLINIC | Age: 73
End: 2023-08-04
Payer: MEDICARE

## 2023-08-04 ENCOUNTER — TELEPHONE (OUTPATIENT)
Dept: ENDOCRINOLOGY | Facility: CLINIC | Age: 73
End: 2023-08-04

## 2023-08-04 VITALS
OXYGEN SATURATION: 97 % | HEIGHT: 60 IN | DIASTOLIC BLOOD PRESSURE: 62 MMHG | SYSTOLIC BLOOD PRESSURE: 130 MMHG | WEIGHT: 101 LBS | BODY MASS INDEX: 19.83 KG/M2 | HEART RATE: 65 BPM

## 2023-08-04 DIAGNOSIS — E03.9 ACQUIRED HYPOTHYROIDISM: Primary | ICD-10-CM

## 2023-08-04 PROCEDURE — 1159F MED LIST DOCD IN RCRD: CPT | Performed by: INTERNAL MEDICINE

## 2023-08-04 PROCEDURE — 99213 OFFICE O/P EST LOW 20 MIN: CPT | Performed by: INTERNAL MEDICINE

## 2023-08-04 PROCEDURE — 1160F RVW MEDS BY RX/DR IN RCRD: CPT | Performed by: INTERNAL MEDICINE

## 2023-08-04 RX ORDER — LEVOTHYROXINE SODIUM 100 MCG
100 TABLET ORAL DAILY
Qty: 90 TABLET | Refills: 3 | Status: SHIPPED | OUTPATIENT
Start: 2023-08-04 | End: 2024-08-03

## 2023-08-04 NOTE — TELEPHONE ENCOUNTER
Patient called office, said that her medication has been denied and that Tier Exception is needing a letter from Dr. Maynard in order to approve. She would like a call back regarding this. Thank you!

## 2023-08-04 NOTE — TELEPHONE ENCOUNTER
Pt called, said Tier exception for Synthroid has run out, needs updated, said we would receive a call or fax for this.    Said to call back if needed.

## 2023-08-04 NOTE — TELEPHONE ENCOUNTER
Prior authorization for Synthroid called, gave them office information, need a call back for more clinical information:    Call Back Number:  [phone]   Ask to be transferred to BCR Team (anyone on BCR team can help with following number).    Prior Authorization for Tier Exception: [id number]    Turn-around 71 hours and 36mins.

## 2023-08-08 ENCOUNTER — TELEPHONE (OUTPATIENT)
Dept: ENDOCRINOLOGY | Facility: CLINIC | Age: 73
End: 2023-08-08
Payer: MEDICARE

## 2023-08-08 RX ORDER — LEVOTHYROXINE SODIUM 100 MCG
100 TABLET ORAL DAILY
Qty: 90 TABLET | Refills: 3 | Status: SHIPPED | OUTPATIENT
Start: 2023-08-08 | End: 2023-08-08 | Stop reason: SDUPTHER

## 2023-08-08 RX ORDER — LEVOTHYROXINE SODIUM 100 MCG
100 TABLET ORAL DAILY
Qty: 90 TABLET | Refills: 3 | Status: SHIPPED | OUTPATIENT
Start: 2023-08-08 | End: 2024-08-07

## 2023-08-08 NOTE — TELEPHONE ENCOUNTER
----- Message from Fatmata Murdock sent at 8/8/2023  3:24 PM EDT -----  Regarding: Synthroid 100mcg  Contact: [phone]  Roslyn Walker,  I contacted the company for my Synthroid 100mcg. you recommended. They are waiting for the prescription to be submitted. Thank-you for your help today.    Kindly,  Fatmata Murdock

## 2023-08-08 NOTE — TELEPHONE ENCOUNTER
Kathy with [employer] called stated they are requesting we resend patients   Synthroid 100 MCG tablet to say this can be filled non brand name. Please advise.     Call back if needed [phone]

## 2023-08-08 NOTE — TELEPHONE ENCOUNTER
Spoke with patient.  Advised that we received denial from insurance for tier exception.  She is going to contact her insurance company and also look into Synthroid Delivers.

## 2023-08-09 RX ORDER — LEVOTHYROXINE SODIUM 0.1 MG/1
100 TABLET ORAL DAILY
Qty: 90 TABLET | Refills: 3 | Status: SHIPPED | OUTPATIENT
Start: 2023-08-09

## 2023-08-09 NOTE — TELEPHONE ENCOUNTER
Pt called, said she's needing a new prescription for levothyroxine, she cannot afford the Synthroid from this current pharmacy.    May need to give her a call.

## 2023-08-09 NOTE — TELEPHONE ENCOUNTER
I called and spoke to the patient .She said that synthyoid direct would cost her a 75 dollar co pay. But if we send a levothyoxine prescription to  [facility] the copay would be 3 dollars. She said [facility] stocks levothyroxine as a generic but gives out a synthyroid.

## 2023-08-09 NOTE — TELEPHONE ENCOUNTER
Rx Refill Note  Requested Prescriptions     Signed Prescriptions Disp Refills    levothyroxine (SYNTHROID, LEVOTHROID) 100 MCG tablet 90 tablet 3     Sig: Take 1 tablet by mouth Daily.     Authorizing Provider: MALA GROVE      Last office visit with prescribing clinician: 8/4/2023   Last telemedicine visit with prescribing clinician: Visit date not found   Next office visit with prescribing clinician: 8/5/2024                         Would you like a call back once the refill request has been completed: [] Yes [] No    If the office needs to give you a call back, can they leave a voicemail: [] Yes [] No    Elyse Mueller MA  08/09/23, 12:58 EDT

## 2023-08-09 NOTE — TELEPHONE ENCOUNTER
FYI patient has other testing scheduled 12- and confirmed COVID scheduling will be Saturday am  12-5-2020, discussed W/ CATH LAB   PT CALLED STATING SHE WAS RETURNING OUR CALL. SHE REQUESTED WE REACH BACK OUT TO HER.

## 2023-08-15 ENCOUNTER — LAB (OUTPATIENT)
Dept: LAB | Facility: HOSPITAL | Age: 73
End: 2023-08-15
Payer: MEDICARE

## 2023-08-15 ENCOUNTER — TRANSCRIBE ORDERS (OUTPATIENT)
Dept: LAB | Facility: HOSPITAL | Age: 73
End: 2023-08-15
Payer: MEDICARE

## 2023-08-15 DIAGNOSIS — L03.011 CELLULITIS OF FINGER, RIGHT: ICD-10-CM

## 2023-08-15 DIAGNOSIS — A69.20 LYME DISEASE: Primary | ICD-10-CM

## 2023-08-15 DIAGNOSIS — L03.113 CELLULITIS OF RIGHT HAND EXCLUDING FINGERS AND THUMB: ICD-10-CM

## 2023-08-15 DIAGNOSIS — M65.141 SUPPURATIVE TENOSYNOVITIS OF FLEXOR TENDON OF RIGHT HAND: ICD-10-CM

## 2023-08-15 DIAGNOSIS — A69.20 LYME DISEASE: ICD-10-CM

## 2023-08-15 DIAGNOSIS — D80.1 HYPOGAMMAGLOBULINEMIA: ICD-10-CM

## 2023-08-15 LAB
ALBUMIN SERPL-MCNC: 3.7 G/DL (ref 3.5–5.2)
ALBUMIN/GLOB SERPL: 1.3 G/DL
ALP SERPL-CCNC: 66 U/L (ref 39–117)
ALT SERPL W P-5'-P-CCNC: 22 U/L (ref 1–33)
ANION GAP SERPL CALCULATED.3IONS-SCNC: 9 MMOL/L (ref 5–15)
AST SERPL-CCNC: 23 U/L (ref 1–32)
BASOPHILS # BLD AUTO: 0.03 10*3/MM3 (ref 0–0.2)
BASOPHILS NFR BLD AUTO: 0.6 % (ref 0–1.5)
BILIRUB SERPL-MCNC: 0.2 MG/DL (ref 0–1.2)
BUN SERPL-MCNC: 17 MG/DL (ref 8–23)
BUN/CREAT SERPL: 30.9 (ref 7–25)
CALCIUM SPEC-SCNC: 9.1 MG/DL (ref 8.6–10.5)
CHLORIDE SERPL-SCNC: 107 MMOL/L (ref 98–107)
CO2 SERPL-SCNC: 25 MMOL/L (ref 22–29)
CREAT SERPL-MCNC: 0.55 MG/DL (ref 0.57–1)
CRP SERPL-MCNC: <0.3 MG/DL (ref 0–0.5)
DEPRECATED RDW RBC AUTO: 46.5 FL (ref 37–54)
EGFRCR SERPLBLD CKD-EPI 2021: 97.5 ML/MIN/1.73
EOSINOPHIL # BLD AUTO: 0.08 10*3/MM3 (ref 0–0.4)
EOSINOPHIL NFR BLD AUTO: 1.5 % (ref 0.3–6.2)
ERYTHROCYTE [DISTWIDTH] IN BLOOD BY AUTOMATED COUNT: 12.9 % (ref 12.3–15.4)
ERYTHROCYTE [SEDIMENTATION RATE] IN BLOOD: 6 MM/HR (ref 0–30)
GLOBULIN UR ELPH-MCNC: 2.9 GM/DL
GLUCOSE SERPL-MCNC: 93 MG/DL (ref 65–99)
HCT VFR BLD AUTO: 42.3 % (ref 34–46.6)
HGB BLD-MCNC: 13.9 G/DL (ref 12–15.9)
IMM GRANULOCYTES # BLD AUTO: 0.01 10*3/MM3 (ref 0–0.05)
IMM GRANULOCYTES NFR BLD AUTO: 0.2 % (ref 0–0.5)
LYMPHOCYTES # BLD AUTO: 1.7 10*3/MM3 (ref 0.7–3.1)
LYMPHOCYTES NFR BLD AUTO: 32.6 % (ref 19.6–45.3)
MCH RBC QN AUTO: 31.9 PG (ref 26.6–33)
MCHC RBC AUTO-ENTMCNC: 32.9 G/DL (ref 31.5–35.7)
MCV RBC AUTO: 97 FL (ref 79–97)
MONOCYTES # BLD AUTO: 0.36 10*3/MM3 (ref 0.1–0.9)
MONOCYTES NFR BLD AUTO: 6.9 % (ref 5–12)
NEUTROPHILS NFR BLD AUTO: 3.04 10*3/MM3 (ref 1.7–7)
NEUTROPHILS NFR BLD AUTO: 58.2 % (ref 42.7–76)
NRBC BLD AUTO-RTO: 0 /100 WBC (ref 0–0.2)
PLATELET # BLD AUTO: 201 10*3/MM3 (ref 140–450)
PMV BLD AUTO: 11.8 FL (ref 6–12)
POTASSIUM SERPL-SCNC: 4.2 MMOL/L (ref 3.5–5.2)
PROT SERPL-MCNC: 6.6 G/DL (ref 6–8.5)
RBC # BLD AUTO: 4.36 10*6/MM3 (ref 3.77–5.28)
SODIUM SERPL-SCNC: 141 MMOL/L (ref 136–145)
WBC NRBC COR # BLD: 5.22 10*3/MM3 (ref 3.4–10.8)

## 2023-08-15 PROCEDURE — 85025 COMPLETE CBC W/AUTO DIFF WBC: CPT

## 2023-08-15 PROCEDURE — 80053 COMPREHEN METABOLIC PANEL: CPT

## 2023-08-15 PROCEDURE — 36415 COLL VENOUS BLD VENIPUNCTURE: CPT

## 2023-08-15 PROCEDURE — 85652 RBC SED RATE AUTOMATED: CPT

## 2023-08-15 PROCEDURE — 86140 C-REACTIVE PROTEIN: CPT

## 2023-08-23 DIAGNOSIS — E03.9 ACQUIRED HYPOTHYROIDISM: Primary | ICD-10-CM

## 2023-09-22 ENCOUNTER — TELEPHONE (OUTPATIENT)
Dept: CARDIOLOGY | Facility: CLINIC | Age: 73
End: 2023-09-22
Payer: MEDICARE

## 2023-09-22 NOTE — TELEPHONE ENCOUNTER
Caller: Fatmata Murdock    Relationship: Self    Best call back number: [phone]    What is the best time to reach you: ANY    Who are you requesting to speak with (clinical staff, provider,  specific staff member): ANY    Do you know the name of the person who called: NOT SURE    What was the call regarding: PT SAID SHE MISSED A CALL BUT NO MESSAGE WAS LEFT AND NO TE. PT WOULD LIKE A CALL BACK TO DISCUSS SOME CLEARANCE PAPERWORK AS WELL.     Is it okay if the provider responds through MyChart: PHONE CALL

## 2023-09-22 NOTE — TELEPHONE ENCOUNTER
Spoke with patient.  She does not need a clearance.  She had surgery on Monday.  She just wanted to know who called.  I can see nothing in her chart that this office or any [facility] office called.  She verbalizes understanding.

## 2023-09-29 ENCOUNTER — TELEPHONE (OUTPATIENT)
Dept: CARDIOLOGY | Facility: CLINIC | Age: 73
End: 2023-09-29
Payer: MEDICARE

## 2023-09-29 NOTE — TELEPHONE ENCOUNTER
Caller: Fatmata Murdock    Relationship: Self    Best call back number: [phone]    What is the best time to reach you: ANYTIME- AFTERNOONS BEST    Who are you requesting to speak with (clinical staff, provider,  specific staff member): CLINICAL      What was the call regarding: PT HAS A FEW QUESTIONS ABOUT HER NEXT APPOINTMENT. SHE WANTS TO KNOW IF SHE NEEDS TO BE SEEN AGAIN IN 6 MONTHS. SHE WOULD LIKE TO SPEAK TO A NURSE TO ADVISE ON KEEPING FOLLOW UP OR NOT. PLEASE REACH OUT.     Is it okay if the provider responds through MyChart: PHONE

## 2023-10-02 ENCOUNTER — TELEPHONE (OUTPATIENT)
Dept: NEUROSURGERY | Facility: CLINIC | Age: 73
End: 2023-10-02
Payer: MEDICARE

## 2023-10-02 DIAGNOSIS — R51.9 NONINTRACTABLE HEADACHE, UNSPECIFIED CHRONICITY PATTERN, UNSPECIFIED HEADACHE TYPE: Primary | ICD-10-CM

## 2023-10-02 NOTE — TELEPHONE ENCOUNTER
Provider:  Cristina  Surgery/Procedure:  DAYANA  Surgery/Procedure Date:    Last visit:   5/30/23  Next visit: 5/6/24     Reason for call:  Patient struck her head yesterday on a metal pipe while doing yard work. Since then she has felt a little fuzzy headed and not quite herself. She has some dizziness that is constant, but no loss of balance, no stumbling and no falls. Her vision in her left eye is a little blurry and she does have a slight headache. Memory and speech are normal.

## 2023-10-04 DIAGNOSIS — Z79.890 HORMONE REPLACEMENT THERAPY (HRT): ICD-10-CM

## 2023-10-04 RX ORDER — ESTRADIOL 0.1 MG/D
PATCH, EXTENDED RELEASE TRANSDERMAL
Qty: 24 PATCH | Refills: 3 | OUTPATIENT
Start: 2023-10-04

## 2023-10-05 ENCOUNTER — TELEPHONE (OUTPATIENT)
Dept: CARDIOLOGY | Facility: CLINIC | Age: 73
End: 2023-10-05
Payer: MEDICARE

## 2023-10-05 NOTE — TELEPHONE ENCOUNTER
Patient left voice mail she is confused about her follow up appointment with Katie and whether or not she needs an echocardiogram.  She is instructed [facility] wanted to see her back in six months after her March appointment.  She was lost in the shuffle and this appointment was not made.  Her appointment with Katie is the six month appointment.  Patient had echocardiogram on 4/19.  The results have been discussed with her.  She will not need another echo for one year - approx. March of next year.  At this point the patient hung up.

## 2023-10-06 ENCOUNTER — OFFICE VISIT (OUTPATIENT)
Dept: NEUROSURGERY | Facility: CLINIC | Age: 73
End: 2023-10-06
Payer: MEDICARE

## 2023-10-06 ENCOUNTER — HOSPITAL ENCOUNTER (OUTPATIENT)
Dept: CT IMAGING | Facility: HOSPITAL | Age: 73
Discharge: HOME | End: 2023-10-06
Payer: MEDICARE

## 2023-10-06 ENCOUNTER — LAB (OUTPATIENT)
Dept: LAB | Facility: HOSPITAL | Age: 73
End: 2023-10-06
Payer: MEDICARE

## 2023-10-06 VITALS
BODY MASS INDEX: 19.87 KG/M2 | HEIGHT: 60 IN | DIASTOLIC BLOOD PRESSURE: 76 MMHG | WEIGHT: 101.2 LBS | SYSTOLIC BLOOD PRESSURE: 140 MMHG | TEMPERATURE: 97.8 F

## 2023-10-06 DIAGNOSIS — S06.0X0A CONCUSSION WITHOUT LOSS OF CONSCIOUSNESS, INITIAL ENCOUNTER: Primary | ICD-10-CM

## 2023-10-06 DIAGNOSIS — R51.9 NONINTRACTABLE HEADACHE, UNSPECIFIED CHRONICITY PATTERN, UNSPECIFIED HEADACHE TYPE: ICD-10-CM

## 2023-10-06 DIAGNOSIS — E03.9 ACQUIRED HYPOTHYROIDISM: ICD-10-CM

## 2023-10-06 PROBLEM — I35.1 AORTIC REGURGITATION: Status: ACTIVE | Noted: 2023-10-06

## 2023-10-06 LAB — TSH SERPL DL<=0.05 MIU/L-ACNC: 0.49 UIU/ML (ref 0.27–4.2)

## 2023-10-06 PROCEDURE — 84443 ASSAY THYROID STIM HORMONE: CPT

## 2023-10-06 PROCEDURE — 70450 CT HEAD/BRAIN W/O DYE: CPT

## 2023-10-06 NOTE — PROGRESS NOTES
Name: Fatmata Murdock    : 1950     MRN: [id number]     Primary Care Provider: Ruth Caceres MD    Chief Complaint  Head Injury      History of Present Illness:  Fatmata Murdock is a 73 y.o. 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 Evans for a thoracic intradural tumor, who presents to the clinic today for evaluation after hitting her head on a metal pipe while doing work 5 days ago.  Since then she has had a headache, fuzzy feeling and light sensitivity and some vision changes.  She denies any focal neuro deficit, worsening headaches, lower extremity weakness.  CT head was obtained today    PMHX  Allergies:  Allergies   Allergen Reactions    Ceftriaxone Nausea And Vomiting, Hives and Other (See Comments)    Levofloxacin Swelling     Pruritus and vision change    Doxycycline GI Intolerance    Effexor Xr [Venlafaxine Hcl Er] Other (See Comments)     Restless legs, dermatitis     Hydrocodone Unknown (See Comments)    Penicillins GI Intolerance    Sulfa Antibiotics GI Intolerance     Medications    Current Outpatient Medications:     buPROPion SR (WELLBUTRIN SR) 150 MG 12 hr tablet, Take 100 mg by mouth Daily., Disp: , Rfl:     cholecalciferol (VITAMIN D3) 1.25 MG (78901 UT) capsule, Take 1 capsule by mouth Every 14 (Fourteen) Days., Disp: , Rfl:     estradiol (VIVELLE-DOT) 0.1 MG/24HR patch, Place 1 patch on the skin as directed by provider 2 (Two) Times a Week., Disp: 24 patch, Rfl: 3    levothyroxine (SYNTHROID, LEVOTHROID) 100 MCG tablet, Take 1 tablet by mouth Daily., Disp: 90 tablet, Rfl: 3    Unable to find, 1 each Every 30 (Thirty) Days. Med Name: Immunoglobulin infusion, Disp: , Rfl:   Past Medical History:  Past Medical History:   Diagnosis Date    Anemia May 1997    The anemia began due to ulcer.    Anxiety     Aortic regurgitation 10/6/2023    Arthritis 2005    Cervical disc disorder     Spine    Depression     Endometriosis     Fibroid      Fibroids     LEIOMYOMA OF UTERUS    H/O gastric ulcer     Heart valve disease 2021     diagnosed regeration from the aerotic valve    Hepatitis A     Hypothyroidism     Iron deficiency     Leukopenia     Migraine     MRSA (methicillin resistant Staphylococcus aureus) September 2014    No comments    MRSA infection     Osteopenia     Ovarian cyst     Pelvic pain     Routine gynecological examination     Thyroid condition     Vitamin B12 deficiency     Vitamin D deficiency      Past Surgical History:  Past Surgical History:   Procedure Laterality Date    APPENDECTOMY      COLONOSCOPY      ESOPHAGUS SURGERY      HAND SURGERY Right 03/2020    HYSTERECTOMY      OOPHORECTOMY      unilateral     OTHER SURGICAL HISTORY      SUBDURAL HEMATOMA REPAIR    SKIN CANCER EXCISION  08/01/2023    VAGOTOMY AND PYLOROPLASTY  1981     Social Hx:  Social History     Tobacco Use    Smoking status: Never    Smokeless tobacco: Never   Vaping Use    Vaping Use: Never used   Substance Use Topics    Alcohol use: Never    Drug use: Never     Family Hx:  Family History   Problem Relation Age of Onset    Depression Mother         Mom    Thyroid disease Mother     No Known Problems Father     Hypertension Sister     Arthritis Sister     Depression Sister     Hypertension Sister     Anxiety disorder Daughter     Anxiety disorder Sister     Thyroid disease Maternal Aunt     Breast cancer Neg Hx     Ovarian cancer Neg Hx     Uterine cancer Neg Hx     Colon cancer Neg Hx     Melanoma Neg Hx     Prostate cancer Neg Hx     Deep vein thrombosis Neg Hx     Pulmonary embolism Neg Hx     Coronary artery disease Neg Hx     Stroke Neg Hx     Osteoporosis Neg Hx     Diabetes Neg Hx      Review of Systems:        Review of Systems   Constitutional:  Positive for fatigue. Negative for activity change, appetite change, chills, diaphoresis, fever and unexpected weight change.   HENT:  Positive for tinnitus. Negative for congestion, dental  problem, drooling, ear discharge, ear pain, facial swelling, hearing loss, mouth sores, nosebleeds, postnasal drip, rhinorrhea, sinus pressure, sinus pain, sneezing, sore throat, trouble swallowing and voice change.    Eyes:  Positive for photophobia, pain and visual disturbance. Negative for discharge, redness and itching.   Respiratory:  Negative for apnea, cough, choking, chest tightness, shortness of breath, wheezing and stridor.    Cardiovascular:  Negative for chest pain, palpitations and leg swelling.   Gastrointestinal:  Negative for abdominal distention, abdominal pain, anal bleeding, blood in stool, constipation, diarrhea, nausea, rectal pain and vomiting.   Endocrine: Negative for cold intolerance, heat intolerance, polydipsia, polyphagia and polyuria.   Genitourinary:  Positive for frequency. Negative for decreased urine volume, difficulty urinating, dyspareunia, dysuria, enuresis, flank pain, genital sores, hematuria, menstrual problem, pelvic pain, urgency, vaginal bleeding, vaginal discharge and vaginal pain.   Musculoskeletal:  Positive for arthralgias. Negative for back pain, gait problem, joint swelling, myalgias, neck pain and neck stiffness.   Skin:  Negative for color change, pallor, rash and wound.   Allergic/Immunologic: Negative for environmental allergies, food allergies and immunocompromised state.   Neurological:  Positive for dizziness, light-headedness and headaches. Negative for tremors, seizures, syncope, facial asymmetry, speech difficulty, weakness and numbness.   Hematological:  Negative for adenopathy. Bruises/bleeds easily.   Psychiatric/Behavioral:  Positive for dysphoric mood. Negative for agitation, behavioral problems, confusion, decreased concentration, hallucinations, self-injury, sleep disturbance and suicidal ideas. The patient is not nervous/anxious and is not hyperactive.         Vital Signs: /76 (BP Location: Right arm, Patient Position: Sitting, Cuff Size: Adult)  "  Temp 97.8 °F (36.6 °C) (Infrared)   Ht 152.4 cm (60\")   Wt 45.9 kg (101 lb 3.2 oz)   BMI 19.76 kg/m²      Physical Exam  Awake, alert and oriented x 3  Speech f/c  Opens eyes spontaneously  Pupils 3 mm rx bilaterally  Extraocular muscles intact bilaterally  Visual fields full  Face symmetric bilaterally  Tongue midline  5/5 in all 4 extremities  Normal sensation to light touch in upper and lower extremities  Wade is negative bilaterally. Clonus is negative bilaterally.   Gait normal.   No PD      Social History    Tobacco Use      Smoking status: Never      Smokeless tobacco: Never       Tobacco Use: Low Risk     Smoking Tobacco Use: Never    Smokeless Tobacco Use: Never    Passive Exposure: Not on file           STEADI Fall Risk Assessment was completed, and patient is at LOW risk for falls.Assessment completed on:10/6/2023      Diagnostic Studies: (All imaging is independently reviewed unless stated otherwise.)  Head CT obtained today shows no acute intracranial abnormality.      Assessment/Plan    Diagnoses and all orders for this visit:    1. Concussion without loss of consciousness, initial encounter (Primary)         This is a 73-year-old female who is being followed by Dr. Evans for a thoracic intradural tumor.  She presents today after hitting her head on a metal pipe about 5 days ago and subsequent headaches since.  She does have some feeling of fogginess as well as vision changes and photophobia.  Head CT today which is normal.  She is neurologically intact on exam with full strength.  Her symptoms are pretty consistent with a concussion.  Patient aware that if she has any worsening symptoms or neurologic deficits that she is to call the office or report to the nearest ER.  Encourage patient to take it easy over the next week or so and to limit screen time at any activities that may worsen her headache.  She is scheduled for new MRI for monitoring of her thoracic intradural tumor in May 2024.  " She will follow-up Dr. Evans afterwards. Patient encouraged to contact us if she has any changes in her condition or any concerns.    Any copied data from previous notes included in the (1) HPI, (2) PE, (3) MDM and/or Assessment and Plan has been reviewed and accurate as of 10/06/23.    Anayeli Stoner PA-C  10/06/23    I spent 30 minutes caring for Fatmata Murdock on 10/06/23.  This time includes activities preparing for the visit, reviewing test, reviewing history and performing an appropriate examination.  Discussing with the patient and reviewing and independently interpreting the diagnostic studies, communicating that information to the patient along with discussing care coordination and referrals if needed and documenting information in the medical records.

## 2023-10-11 ENCOUNTER — OFFICE VISIT (OUTPATIENT)
Dept: CARDIOLOGY | Facility: CLINIC | Age: 73
End: 2023-10-11
Payer: MEDICARE

## 2023-10-11 ENCOUNTER — HOSPITAL ENCOUNTER (OUTPATIENT)
Dept: CARDIOLOGY | Facility: HOSPITAL | Age: 73
Discharge: HOME | End: 2023-10-11
Payer: MEDICARE

## 2023-10-11 VITALS
DIASTOLIC BLOOD PRESSURE: 68 MMHG | WEIGHT: 100.4 LBS | OXYGEN SATURATION: 96 % | SYSTOLIC BLOOD PRESSURE: 118 MMHG | HEART RATE: 76 BPM | BODY MASS INDEX: 19.71 KG/M2 | HEIGHT: 60 IN

## 2023-10-11 VITALS
BODY MASS INDEX: 19.69 KG/M2 | SYSTOLIC BLOOD PRESSURE: 133 MMHG | WEIGHT: 100.31 LBS | HEIGHT: 60 IN | DIASTOLIC BLOOD PRESSURE: 66 MMHG

## 2023-10-11 DIAGNOSIS — I35.1 NONRHEUMATIC AORTIC VALVE INSUFFICIENCY: ICD-10-CM

## 2023-10-11 DIAGNOSIS — R06.02 SHORTNESS OF BREATH: ICD-10-CM

## 2023-10-11 DIAGNOSIS — I71.20 THORACIC AORTIC ANEURYSM WITHOUT RUPTURE, UNSPECIFIED PART: ICD-10-CM

## 2023-10-11 DIAGNOSIS — I35.1 NONRHEUMATIC AORTIC VALVE INSUFFICIENCY: Primary | ICD-10-CM

## 2023-10-11 LAB
ASCENDING AORTA: 4 CM
BH CV ECHO MEAS - AI P1/2T: 333.4 MSEC
BH CV ECHO MEAS - AO MAX PG: 11.4 MMHG
BH CV ECHO MEAS - AO MEAN PG: 7 MMHG
BH CV ECHO MEAS - AO ROOT DIAM: 3.3 CM
BH CV ECHO MEAS - AO V2 MAX: 168.8 CM/SEC
BH CV ECHO MEAS - AO V2 VTI: 38 CM
BH CV ECHO MEAS - AVA(I,D): 2.03 CM2
BH CV ECHO MEAS - EDV(CUBED): 106.7 ML
BH CV ECHO MEAS - EDV(MOD-SP2): 65.5 ML
BH CV ECHO MEAS - EDV(MOD-SP4): 77.6 ML
BH CV ECHO MEAS - EF(MOD-BP): 59 %
BH CV ECHO MEAS - EF(MOD-SP2): 62.7 %
BH CV ECHO MEAS - EF(MOD-SP4): 58.4 %
BH CV ECHO MEAS - ESV(CUBED): 22.2 ML
BH CV ECHO MEAS - ESV(MOD-SP2): 24.4 ML
BH CV ECHO MEAS - ESV(MOD-SP4): 32.3 ML
BH CV ECHO MEAS - FS: 40.8 %
BH CV ECHO MEAS - IVS/LVPW: 1.08 CM
BH CV ECHO MEAS - IVSD: 0.94 CM
BH CV ECHO MEAS - LA DIMENSION: 3.6 CM
BH CV ECHO MEAS - LAT PEAK E' VEL: 9 CM/SEC
BH CV ECHO MEAS - LV DIASTOLIC VOL/BSA (35-75): 55.9 CM2
BH CV ECHO MEAS - LV MASS(C)D: 145 GRAMS
BH CV ECHO MEAS - LV MAX PG: 5.5 MMHG
BH CV ECHO MEAS - LV MEAN PG: 3 MMHG
BH CV ECHO MEAS - LV SYSTOLIC VOL/BSA (12-30): 23.2 CM2
BH CV ECHO MEAS - LV V1 MAX: 115 CM/SEC
BH CV ECHO MEAS - LV V1 VTI: 24.9 CM
BH CV ECHO MEAS - LVIDD: 4.7 CM
BH CV ECHO MEAS - LVIDS: 2.8 CM
BH CV ECHO MEAS - LVOT AREA: 3.1 CM2
BH CV ECHO MEAS - LVOT DIAM: 1.99 CM
BH CV ECHO MEAS - LVPWD: 0.86 CM
BH CV ECHO MEAS - MED PEAK E' VEL: 8 CM/SEC
BH CV ECHO MEAS - MR MAX VEL: 6 CM/SEC
BH CV ECHO MEAS - MR VTI: 209 CM
BH CV ECHO MEAS - MV A MAX VEL: 50 CM/SEC
BH CV ECHO MEAS - MV DEC SLOPE: 76.1 CM/SEC2
BH CV ECHO MEAS - MV DEC TIME: 0.32 SEC
BH CV ECHO MEAS - MV E MAX VEL: 24.1 CM/SEC
BH CV ECHO MEAS - MV E/A: 0.48
BH CV ECHO MEAS - MV MAX PG: 2.5 MMHG
BH CV ECHO MEAS - MV MEAN PG: 1.07 MMHG
BH CV ECHO MEAS - MV V2 VTI: 23.5 CM
BH CV ECHO MEAS - MVA(VTI): 3.3 CM2
BH CV ECHO MEAS - PA ACC TIME: 0.13 SEC
BH CV ECHO MEAS - PA V2 MAX: 91.3 CM/SEC
BH CV ECHO MEAS - PI END-D VEL: 91.5 CM/SEC
BH CV ECHO MEAS - PULM A REVS DUR: 0.18 SEC
BH CV ECHO MEAS - PULM A REVS VEL: 21.1 CM/SEC
BH CV ECHO MEAS - PULM DIAS VEL: 56.6 CM/SEC
BH CV ECHO MEAS - PULM S/D: 0.57
BH CV ECHO MEAS - PULM SYS VEL: 32.1 CM/SEC
BH CV ECHO MEAS - RAP SYSTOLE: 3 MMHG
BH CV ECHO MEAS - RVOT DIAM: 3.3 CM
BH CV ECHO MEAS - RVSP: 19 MMHG
BH CV ECHO MEAS - SI(MOD-SP2): 29.6 ML/M2
BH CV ECHO MEAS - SI(MOD-SP4): 32.6 ML/M2
BH CV ECHO MEAS - SV(LVOT): 77.2 ML
BH CV ECHO MEAS - SV(MOD-SP2): 41.1 ML
BH CV ECHO MEAS - SV(MOD-SP4): 45.3 ML
BH CV ECHO MEAS - TAPSE (>1.6): 2.6 CM
BH CV ECHO MEAS - TR MAX PG: 16.5 MMHG
BH CV ECHO MEAS - TR MAX VEL: 199.9 CM/SEC
BH CV ECHO MEASUREMENTS AVERAGE E/E' RATIO: 2.84
BH CV XLRA - RV BASE: 4.2 CM
BH CV XLRA - RV LENGTH: 8.3 CM
BH CV XLRA - RV MID: 2.8 CM
BH CV XLRA - TDI S': 14 CM/SEC
IVRT: 72 MS
LEFT ATRIUM VOLUME INDEX: 29 ML/M2
LV EF 2D ECHO EST: 55 %
MR PISA EROA: 0.2 CM2
MV REGURGITANT FRACTION: 9 %
MV REGURGITANT VOLUME: 42 CC
PISA ALIASING VEL: 0.34 M/S
PISA RADIUS: 0.7 CM

## 2023-10-11 PROCEDURE — 93306 TTE W/DOPPLER COMPLETE: CPT

## 2023-10-19 ENCOUNTER — TELEPHONE (OUTPATIENT)
Dept: CARDIOLOGY | Facility: CLINIC | Age: 73
End: 2023-10-19
Payer: MEDICARE

## 2023-10-19 NOTE — TELEPHONE ENCOUNTER
Can you please call Ms. Murdock and let her know that her echocardiogram is unchanged from her previous echo. If her symptoms worsen, we can obtain a stress test for ischemic evaluation.

## 2023-10-19 NOTE — TELEPHONE ENCOUNTER
Spoke with patient.  She is instructed that her echocardiogram was normal.  If her symptoms worsen, we can order a stress test to further evaluate her.  She verbalizes understanding.

## 2023-11-14 ENCOUNTER — LAB (OUTPATIENT)
Dept: LAB | Facility: HOSPITAL | Age: 73
End: 2023-11-14
Payer: MEDICARE

## 2023-11-14 ENCOUNTER — TRANSCRIBE ORDERS (OUTPATIENT)
Dept: LAB | Facility: HOSPITAL | Age: 73
End: 2023-11-14
Payer: MEDICARE

## 2023-11-14 DIAGNOSIS — R61 GENERALIZED HYPERHIDROSIS: ICD-10-CM

## 2023-11-14 DIAGNOSIS — R25.2 CRAMP OF LIMB: ICD-10-CM

## 2023-11-14 DIAGNOSIS — D80.1 COMMON VARIABLE AGAMMAGLOBULINEMIA: ICD-10-CM

## 2023-11-14 DIAGNOSIS — M65.141 OTHER INFECTIVE (TENO)SYNOVITIS, RIGHT HAND: ICD-10-CM

## 2023-11-14 DIAGNOSIS — R25.2 CRAMP OF LIMB: Primary | ICD-10-CM

## 2023-11-14 LAB
ALBUMIN SERPL-MCNC: 3.9 G/DL (ref 3.5–5.2)
ALBUMIN/GLOB SERPL: 1.7 G/DL
ALP SERPL-CCNC: 56 U/L (ref 39–117)
ALT SERPL W P-5'-P-CCNC: 18 U/L (ref 1–33)
ANION GAP SERPL CALCULATED.3IONS-SCNC: 8 MMOL/L (ref 5–15)
AST SERPL-CCNC: 19 U/L (ref 1–32)
BASOPHILS # BLD AUTO: 0.06 10*3/MM3 (ref 0–0.2)
BASOPHILS NFR BLD AUTO: 0.9 % (ref 0–1.5)
BILIRUB SERPL-MCNC: 0.2 MG/DL (ref 0–1.2)
BUN SERPL-MCNC: 17 MG/DL (ref 8–23)
BUN/CREAT SERPL: 34.7 (ref 7–25)
CALCIUM SPEC-SCNC: 8.7 MG/DL (ref 8.6–10.5)
CHLORIDE SERPL-SCNC: 104 MMOL/L (ref 98–107)
CO2 SERPL-SCNC: 28 MMOL/L (ref 22–29)
CREAT SERPL-MCNC: 0.49 MG/DL (ref 0.57–1)
DEPRECATED RDW RBC AUTO: 48.7 FL (ref 37–54)
EGFRCR SERPLBLD CKD-EPI 2021: 100.3 ML/MIN/1.73
EOSINOPHIL # BLD AUTO: 0.05 10*3/MM3 (ref 0–0.4)
EOSINOPHIL NFR BLD AUTO: 0.8 % (ref 0.3–6.2)
ERYTHROCYTE [DISTWIDTH] IN BLOOD BY AUTOMATED COUNT: 13.2 % (ref 12.3–15.4)
GLOBULIN UR ELPH-MCNC: 2.3 GM/DL
GLUCOSE SERPL-MCNC: 90 MG/DL (ref 65–99)
HCT VFR BLD AUTO: 43.7 % (ref 34–46.6)
HGB BLD-MCNC: 13.9 G/DL (ref 12–15.9)
IMM GRANULOCYTES # BLD AUTO: 0.01 10*3/MM3 (ref 0–0.05)
IMM GRANULOCYTES NFR BLD AUTO: 0.2 % (ref 0–0.5)
LYMPHOCYTES # BLD AUTO: 1.57 10*3/MM3 (ref 0.7–3.1)
LYMPHOCYTES NFR BLD AUTO: 24.8 % (ref 19.6–45.3)
MAGNESIUM SERPL-MCNC: 2.1 MG/DL (ref 1.6–2.4)
MCH RBC QN AUTO: 31.6 PG (ref 26.6–33)
MCHC RBC AUTO-ENTMCNC: 31.8 G/DL (ref 31.5–35.7)
MCV RBC AUTO: 99.3 FL (ref 79–97)
MONOCYTES # BLD AUTO: 0.39 10*3/MM3 (ref 0.1–0.9)
MONOCYTES NFR BLD AUTO: 6.2 % (ref 5–12)
NEUTROPHILS NFR BLD AUTO: 4.26 10*3/MM3 (ref 1.7–7)
NEUTROPHILS NFR BLD AUTO: 67.1 % (ref 42.7–76)
NRBC BLD AUTO-RTO: 0 /100 WBC (ref 0–0.2)
PHOSPHATE SERPL-MCNC: 2.5 MG/DL (ref 2.5–4.5)
PLATELET # BLD AUTO: 203 10*3/MM3 (ref 140–450)
PMV BLD AUTO: 12.5 FL (ref 6–12)
POTASSIUM SERPL-SCNC: 4.3 MMOL/L (ref 3.5–5.2)
PROT SERPL-MCNC: 6.2 G/DL (ref 6–8.5)
RBC # BLD AUTO: 4.4 10*6/MM3 (ref 3.77–5.28)
SODIUM SERPL-SCNC: 140 MMOL/L (ref 136–145)
WBC NRBC COR # BLD: 6.34 10*3/MM3 (ref 3.4–10.8)

## 2023-11-14 PROCEDURE — 83735 ASSAY OF MAGNESIUM: CPT

## 2023-11-14 PROCEDURE — 36415 COLL VENOUS BLD VENIPUNCTURE: CPT

## 2023-11-14 PROCEDURE — 82607 VITAMIN B-12: CPT

## 2023-11-14 PROCEDURE — 80053 COMPREHEN METABOLIC PANEL: CPT

## 2023-11-14 PROCEDURE — 84100 ASSAY OF PHOSPHORUS: CPT

## 2023-11-14 PROCEDURE — 85025 COMPLETE CBC W/AUTO DIFF WBC: CPT

## 2023-11-15 LAB — VIT B12 BLD-MCNC: 1275 PG/ML (ref 211–946)

## 2023-12-04 ENCOUNTER — TELEPHONE (OUTPATIENT)
Dept: OBSTETRICS AND GYNECOLOGY | Facility: CLINIC | Age: 73
End: 2023-12-04
Payer: MEDICARE

## 2023-12-04 NOTE — TELEPHONE ENCOUNTER
Tier Exception/PA pending for Estradol Patch.  Update: Available without authorization. The member is able to fill the requested drug at the pharmacy. If coverage is still needed or requesting prior to the expiration of a current authorization, a request can be made by sending a fax or calling the number on the back of the member's ID card

## 2023-12-04 NOTE — TELEPHONE ENCOUNTER
I called the pt and let her know I will follow up on the tier exception of twice weekly Estradial. I said I will call Amilcar about the tier exception currently in place but she may be changing her plan and will update me.

## 2023-12-06 ENCOUNTER — TELEPHONE (OUTPATIENT)
Dept: OBSTETRICS AND GYNECOLOGY | Facility: CLINIC | Age: 73
End: 2023-12-06
Payer: MEDICARE

## 2023-12-06 NOTE — PROGRESS NOTES
[facility]    Patient ID: Fatmata Murdock is a 73 y.o. female.  : 1950   Contact: [phone]    Encounter date: 2023    PCP: Ruth Caceres MD      Chief complaint:   Chief Complaint   Patient presents with    Nonrheumatic aortic valve insufficiency     1-Mo F/U       Problem List:  Aortic aneurysm  CT scan for coronary artery calcification 2020 revealed an enlargement of the ascending aorta up to 4.1 cm.  The coronary artery calcium score was 4.  Echocardiogram, 2021: EF 60%. Moderate dilation on ascending aorta. Aortic size index 2.64. Moderate AR.   Echocardiogram, 2022: EF 60%. Aortic root dilation 3.8 cm. Moderate AR.  Aortic size index 2.8.  CT scan of the chest with contrast 3/15/2022 revealed a 4 cm a sending aorta at the level of the main pulmonary artery.  (Also noted is a 12 x 9 x 5 mm calcified right paracentral structure at the T4-5 level which could be a calcified chronic disc excreted extrusion or meningioma.)  Aortic regurgitation   Echocardiogram, 2022: EF 60%. Aortic root dilation 3.8 cm. Moderate AR.  Aortic size index 2.8.  Echocardiogram 2023: EF 55%, moderate AI, mild MR and TR, mild dilation of the ascending aorta is present.  Aortic size index 2.9 cm/m²  Echo, 10/11/2023: EF 55%. Moderate AR. Mild MR/TR. RVSP is 19 mmHg. The aortic size index is 2.8 cm/m².   Family history of CAD  Osteoporosis  Dr. Guo  Thoracic intradural tumor  Followed by Dr. Evans  CT scan of the chest with contrast 3/15/2022 revealed a 4 cm a sending aorta at the level of the main pulmonary artery.  (Also noted is a 12 x 9 x 5 mm calcified right paracentral structure at the T4-5 level which could be a calcified chronic disc excreted extrusion or meningioma.  MRI thoracic spine, 2022; 14 x 10 x 10 mm extradural enhancing intradural extra medullary finding at the level of T4, favoring meningioma, versus possible schwannoma  given mild adjacent involvement of the right neural foramen. The adjacent thoracic spinal cord is effaced and displaced posteriorly, otherwise without focal signal abnormality concerning for cord edema.   Hypothyroidism  Right subdural hematoma, s/p two shayy hole washout, 7/30/19, [facility]  Hypogammaglobulinemia  Followed by Dr. Guan  IgG infusions monthly  S/p vagotomy for duodenal ulcer    Allergies   Allergen Reactions    Ceftriaxone Nausea And Vomiting, Hives and Other (See Comments)    Levofloxacin Swelling     Pruritus and vision change    Codeine Itching    Effexor Xr [Venlafaxine Hcl Er] Other (See Comments)     Restless legs, dermatitis     Hydrocodone Unknown (See Comments)    Penicillins GI Intolerance    Sulfa Antibiotics GI Intolerance       Current Medications:    Current Outpatient Medications:     buPROPion SR (WELLBUTRIN SR) 100 MG 12 hr tablet, Take 2 tablets by mouth., Disp: , Rfl:     cholecalciferol (VITAMIN D3) 1.25 MG (54317 UT) capsule, Take 1 capsule by mouth Every 14 (Fourteen) Days., Disp: , Rfl:     estradiol (VIVELLE-DOT) 0.1 MG/24HR patch, Place 1 patch on the skin as directed by provider 2 (Two) Times a Week., Disp: 24 patch, Rfl: 3    levothyroxine (SYNTHROID, LEVOTHROID) 100 MCG tablet, Take 1 tablet by mouth Daily., Disp: 90 tablet, Rfl: 3    Unable to find, 1 each Every 30 (Thirty) Days. Med Name: Immunoglobulin infusion, Disp: , Rfl:     HPI    Fatmata Murdock is a 73 y.o. female who presents today for a follow up of aortic aneurysm, aortic valve insufficiency, and cardiac risk factors. Since last visit, patient has been doing well overall from a cardiovascular standpoint. She reports that she had surgery on her left hand this past September. Patient notes that she got Lyme disease after [place]. She monitors her blood pressure regularly at home and states it normally measures 120 mmHg systolic. Patient stays busy and active by walking 5 times weekly. She  "questions if she can be prescribed Thiamine and was advised that it is usually OTC and not recommended for the heart. Patient notes that her neck has been stiff since her subdural hematoma surgery and questions if she should be worried and was advised it is most likely due to scar tissue. She states that she is often fatigued. Patient denies chest pain, shortness of breath, orthopnea, palpitations, edema, dizziness, and syncope.       The following portions of the patient's history were reviewed and updated as appropriate: allergies, current medications and problem list.    Pertinent positives as listed in the HPI.  All other systems reviewed are negative.         Vitals:    12/07/23 1007   BP: 124/64   BP Location: Left arm   Patient Position: Sitting   Cuff Size: Adult   Pulse: 72   SpO2: 97%   Weight: 47.7 kg (105 lb 3.2 oz)   Height: 152.4 cm (60\")       Physical Exam:  General: Alert and oriented.  Neck: Jugular venous pressure is within normal limits. Carotids have normal upstrokes without bruits.   Cardiovascular: Heart has a nondisplaced focal PMI. Regular rate and rhythm. 2/6 diastolic murmur L and R USB murmur, no gallop or rub.  Lungs: Clear, no rales or wheezes. Equal expansion is noted.   Extremities: Show no edema.  Skin: Warm and dry.  Neurologic: Nonfocal.     Diagnostic Data (reviewed with patient):  Lab Results   Component Value Date    GLUCOSE 90 11/14/2023    BUN 17 11/14/2023    CREATININE 0.49 (L) 11/14/2023    BCR 34.7 (H) 11/14/2023     11/14/2023    K 4.3 11/14/2023     11/14/2023    CO2 28.0 11/14/2023    CALCIUM 8.7 11/14/2023    ALBUMIN 3.9 11/14/2023    ALKPHOS 56 11/14/2023    AST 19 11/14/2023    ALT 18 11/14/2023     Lab Results   Component Value Date    CHOL 176 03/01/2023    TRIG 54 03/01/2023    HDL 78 (H) 03/01/2023    LDL 87 03/01/2023      Lab Results   Component Value Date    WBC 6.34 11/14/2023    RBC 4.40 11/14/2023    HGB 13.9 11/14/2023    HCT 43.7 11/14/2023    " MCV 99.3 (H) 11/14/2023     11/14/2023      Lab Results   Component Value Date    TSH 0.488 10/06/2023               Procedures      Assessment:    ICD-10-CM ICD-9-CM   1. Nonrheumatic aortic valve insufficiency  I35.1 424.1   2. Thoracic aortic aneurysm without rupture, unspecified part  I71.20 441.2         Plan:  Diltiazem discussed with patient for recheck heart rate and blood pressure and she has requested to do more research before starting.  Actually a beta-blocker would be even better.  Echocardiogram ordered to reassess valvular function, prior to appt next year.  Continue all other current medications.  F/up in 12 months, sooner if needed.      Scribed for Blanche Velasco MD by Joana Brooks. 12/7/2023 10:15 EST    I Blanche Velasco MD personally performed the services described in this documentation as scribed by the above individual in my presence, and it is both accurate and complete.    Blanche Velasco MD, FACC

## 2023-12-06 NOTE — TELEPHONE ENCOUNTER
I called to see if I could do renew the tier exception for the Estradiol patches and they said I need to do the PA renewal first. The PA department said they could not initiate the PA because it was a refill too soon and the pt can refill on 12/12/2023. There is nothing that can be done until 2024.

## 2023-12-07 ENCOUNTER — OFFICE VISIT (OUTPATIENT)
Dept: CARDIOLOGY | Facility: CLINIC | Age: 73
End: 2023-12-07
Payer: MEDICARE

## 2023-12-07 VITALS
SYSTOLIC BLOOD PRESSURE: 124 MMHG | BODY MASS INDEX: 20.65 KG/M2 | WEIGHT: 105.2 LBS | OXYGEN SATURATION: 97 % | DIASTOLIC BLOOD PRESSURE: 64 MMHG | HEIGHT: 60 IN | HEART RATE: 72 BPM

## 2023-12-07 DIAGNOSIS — I71.20 THORACIC AORTIC ANEURYSM WITHOUT RUPTURE, UNSPECIFIED PART: ICD-10-CM

## 2023-12-07 DIAGNOSIS — I35.1 NONRHEUMATIC AORTIC VALVE INSUFFICIENCY: Primary | ICD-10-CM

## 2023-12-07 PROCEDURE — 1160F RVW MEDS BY RX/DR IN RCRD: CPT | Performed by: INTERNAL MEDICINE

## 2023-12-07 PROCEDURE — 99213 OFFICE O/P EST LOW 20 MIN: CPT | Performed by: INTERNAL MEDICINE

## 2023-12-07 PROCEDURE — 1159F MED LIST DOCD IN RCRD: CPT | Performed by: INTERNAL MEDICINE

## 2023-12-07 RX ORDER — BUPROPION HYDROCHLORIDE 100 MG/1
200 TABLET, EXTENDED RELEASE ORAL
COMMUNITY
Start: 2023-11-30

## 2023-12-18 ENCOUNTER — LAB (OUTPATIENT)
Dept: LAB | Facility: HOSPITAL | Age: 73
End: 2023-12-18
Payer: MEDICARE

## 2023-12-18 DIAGNOSIS — D83.9 COMMON VARIABLE IMMUNODEFICIENCY: ICD-10-CM

## 2023-12-18 DIAGNOSIS — M19.90 ARTHRITIS: Primary | ICD-10-CM

## 2023-12-18 DIAGNOSIS — M19.90 ARTHRITIS: ICD-10-CM

## 2023-12-18 LAB
ALBUMIN SERPL-MCNC: 4 G/DL (ref 3.5–5.2)
ALBUMIN/GLOB SERPL: 1.4 G/DL
ALP SERPL-CCNC: 51 U/L (ref 39–117)
ALT SERPL W P-5'-P-CCNC: 22 U/L (ref 1–33)
ANION GAP SERPL CALCULATED.3IONS-SCNC: 10.2 MMOL/L (ref 5–15)
AST SERPL-CCNC: 22 U/L (ref 1–32)
BASOPHILS # BLD AUTO: 0.05 10*3/MM3 (ref 0–0.2)
BASOPHILS NFR BLD AUTO: 0.7 % (ref 0–1.5)
BILIRUB SERPL-MCNC: 0.3 MG/DL (ref 0–1.2)
BUN SERPL-MCNC: 13 MG/DL (ref 8–23)
BUN/CREAT SERPL: 22.4 (ref 7–25)
CALCIUM SPEC-SCNC: 8.9 MG/DL (ref 8.6–10.5)
CHLORIDE SERPL-SCNC: 102 MMOL/L (ref 98–107)
CHROMATIN AB SERPL-ACNC: <10 IU/ML (ref 0–14)
CO2 SERPL-SCNC: 24.8 MMOL/L (ref 22–29)
CREAT SERPL-MCNC: 0.58 MG/DL (ref 0.57–1)
CRP SERPL-MCNC: <0.3 MG/DL (ref 0–0.5)
DEPRECATED RDW RBC AUTO: 41.4 FL (ref 37–54)
EGFRCR SERPLBLD CKD-EPI 2021: 96.3 ML/MIN/1.73
EOSINOPHIL # BLD AUTO: 0.05 10*3/MM3 (ref 0–0.4)
EOSINOPHIL NFR BLD AUTO: 0.7 % (ref 0.3–6.2)
ERYTHROCYTE [DISTWIDTH] IN BLOOD BY AUTOMATED COUNT: 11.9 % (ref 12.3–15.4)
ERYTHROCYTE [SEDIMENTATION RATE] IN BLOOD: 6 MM/HR (ref 0–30)
GLOBULIN UR ELPH-MCNC: 2.8 GM/DL
GLUCOSE SERPL-MCNC: 88 MG/DL (ref 65–99)
HCT VFR BLD AUTO: 42.8 % (ref 34–46.6)
HGB BLD-MCNC: 14.4 G/DL (ref 12–15.9)
IMM GRANULOCYTES # BLD AUTO: 0.02 10*3/MM3 (ref 0–0.05)
IMM GRANULOCYTES NFR BLD AUTO: 0.3 % (ref 0–0.5)
LYMPHOCYTES # BLD AUTO: 1.38 10*3/MM3 (ref 0.7–3.1)
LYMPHOCYTES NFR BLD AUTO: 18.4 % (ref 19.6–45.3)
MCH RBC QN AUTO: 32.1 PG (ref 26.6–33)
MCHC RBC AUTO-ENTMCNC: 33.6 G/DL (ref 31.5–35.7)
MCV RBC AUTO: 95.3 FL (ref 79–97)
MONOCYTES # BLD AUTO: 0.35 10*3/MM3 (ref 0.1–0.9)
MONOCYTES NFR BLD AUTO: 4.7 % (ref 5–12)
NEUTROPHILS NFR BLD AUTO: 5.64 10*3/MM3 (ref 1.7–7)
NEUTROPHILS NFR BLD AUTO: 75.2 % (ref 42.7–76)
NRBC BLD AUTO-RTO: 0 /100 WBC (ref 0–0.2)
PLATELET # BLD AUTO: 228 10*3/MM3 (ref 140–450)
PMV BLD AUTO: 12.5 FL (ref 6–12)
POTASSIUM SERPL-SCNC: 4.2 MMOL/L (ref 3.5–5.2)
PROT SERPL-MCNC: 6.8 G/DL (ref 6–8.5)
RBC # BLD AUTO: 4.49 10*6/MM3 (ref 3.77–5.28)
SODIUM SERPL-SCNC: 137 MMOL/L (ref 136–145)
WBC NRBC COR # BLD AUTO: 7.49 10*3/MM3 (ref 3.4–10.8)

## 2023-12-18 PROCEDURE — 86140 C-REACTIVE PROTEIN: CPT

## 2023-12-18 PROCEDURE — 85652 RBC SED RATE AUTOMATED: CPT

## 2023-12-18 PROCEDURE — 85025 COMPLETE CBC W/AUTO DIFF WBC: CPT

## 2023-12-18 PROCEDURE — 86038 ANTINUCLEAR ANTIBODIES: CPT

## 2023-12-18 PROCEDURE — 82164 ANGIOTENSIN I ENZYME TEST: CPT

## 2023-12-18 PROCEDURE — 36415 COLL VENOUS BLD VENIPUNCTURE: CPT

## 2023-12-18 PROCEDURE — 86225 DNA ANTIBODY NATIVE: CPT

## 2023-12-18 PROCEDURE — 80053 COMPREHEN METABOLIC PANEL: CPT

## 2023-12-18 PROCEDURE — 86200 CCP ANTIBODY: CPT

## 2023-12-18 PROCEDURE — 86431 RHEUMATOID FACTOR QUANT: CPT

## 2023-12-19 LAB
ACE SERPL-CCNC: 52 U/L (ref 14–82)
ANA SER QL: NEGATIVE
CCP IGA+IGG SERPL IA-ACNC: 7 UNITS (ref 0–19)
DSDNA AB SER-ACNC: <1 IU/ML (ref 0–9)

## 2023-12-20 ENCOUNTER — TELEPHONE (OUTPATIENT)
Dept: ENDOCRINOLOGY | Facility: CLINIC | Age: 73
End: 2023-12-20
Payer: MEDICARE

## 2023-12-20 NOTE — TELEPHONE ENCOUNTER
"PT CALLED REQUESTING WE REFAX RX FORM DATED 12/15/2023. SHE STATED SECTION 8 WHERE \"SEE ATTACHED\" IS WRITTEN NEEDS TO BE FILLED OUT.  "

## 2024-01-03 NOTE — PROGRESS NOTES
[facility] Cardiology    Encounter Date: 10/11/2023    Patient ID: Fatmata Murdock is a 73 y.o. female.  : 1950     PCP: Ruth Caceres MD       Chief Complaint: Moderate aortic regurgitation      PROBLEM LIST:  Aortic aneurysm  CT scan for coronary artery calcification 2020 revealed an enlargement of the ascending aorta up to 4.1 cm.  The coronary artery calcium score was 4.  Echocardiogram, 2021: EF 60%. Moderate dilation on ascending aorta. Aortic size index 2.64. Moderate AR.   Echocardiogram, 2022: EF 60%. Aortic root dilation 3.8 cm. Moderate AR.  Aortic size index 2.8.  CT scan of the chest with contrast 3/15/2022 revealed a 4 cm a sending aorta at the level of the main pulmonary artery.  (Also noted is a 12 x 9 x 5 mm calcified right paracentral structure at the T4-5 level which could be a calcified chronic disc excreted extrusion or meningioma.)  Aortic regurgitation   Echocardiogram, 2022: EF 60%. Aortic root dilation 3.8 cm. Moderate AR.  Aortic size index 2.8.  Echocardiogram 2023: EF 55%, moderate AI, mild MR and TR, mild dilation of the ascending aorta is present.  Aortic size index 2.9 cm/mý  Family history of CAD  Osteoporosis  Dr. Guo  Thoracic intradural tumor  Followed by Dr. Evans  CT scan of the chest with contrast 3/15/2022 revealed a 4 cm a sending aorta at the level of the main pulmonary artery.  (Also noted is a 12 x 9 x 5 mm calcified right paracentral structure at the T4-5 level which could be a calcified chronic disc excreted extrusion or meningioma.  MRI thoracic spine, 2022; 14 x 10 x 10 mm extradural enhancing intradural extra medullary finding at the level of T4, favoring meningioma, versus possible schwannoma given mild adjacent involvement of the right neural foramen. The adjacent thoracic spinal cord is effaced and displaced posteriorly, otherwise without focal signal abnormality concerning for cord  edema.   Hypothyroidism  Right subdural hematoma, s/p two shayy hole washout, 7/30/19, [facility]  Hypogammaglobulinemia  Followed by Dr. Guan  IgG infusions monthly  S/p vagotomy for duodenal ulcer    History of Present Illness  Patient presents today for 6-month follow-up with a history of aortic aneurysm, aortic valve insufficiency and cardiac risk factors. Since last visit, patient reports that she has been having progressive shortness of breath over the last couple of months.  She is previously a marathon runner and has been limited in her physical activity due to back pain.  However, she does state that she is still able to walk 5 miles in 45 minutes.  She does state that with this she has become more short of breath and has not been able to tolerate hills the way that she feels like she should with her fitness level.  She does not have any chest pain with exertion.  She denies orthopnea, edema, presyncope or syncope.    Allergies   Allergen Reactions    Ceftriaxone Nausea And Vomiting, Hives and Other (See Comments)    Levofloxacin Swelling     Pruritus and vision change    Effexor Xr [Venlafaxine Hcl Er] Other (See Comments)     Restless legs, dermatitis     Hydrocodone Unknown (See Comments)    Penicillins GI Intolerance    Sulfa Antibiotics GI Intolerance         Current Outpatient Medications:     buPROPion SR (WELLBUTRIN SR) 150 MG 12 hr tablet, Take 100 mg by mouth Daily., Disp: , Rfl:     cholecalciferol (VITAMIN D3) 1.25 MG (80392 UT) capsule, Take 1 capsule by mouth Every 14 (Fourteen) Days., Disp: , Rfl:     estradiol (VIVELLE-DOT) 0.1 MG/24HR patch, Place 1 patch on the skin as directed by provider 2 (Two) Times a Week., Disp: 24 patch, Rfl: 3    levothyroxine (SYNTHROID, LEVOTHROID) 100 MCG tablet, Take 1 tablet by mouth Daily., Disp: 90 tablet, Rfl: 3    Unable to find, 1 each Every 30 (Thirty) Days. Med Name: Immunoglobulin infusion, Disp: , Rfl:     The following portions of the patient's history  "were reviewed and updated as appropriate: allergies, current medications, past family history, past medical history, past social history, past surgical history and problem list.    Review of Systems   12 point ROS negative except for that listed in the HPI.        Objective:     /68 (BP Location: Right arm, Patient Position: Sitting)   Pulse 76   Ht 152.4 cm (60\")   Wt 45.5 kg (100 lb 6.4 oz)   SpO2 96%   BMI 19.61 kg/mý      Physical Exam  Constitutional: Patient appears well-developed and well-nourished.   HENT: HEENT exam unremarkable.   Neck: Neck supple. No JVD present. No carotid bruits.   Cardiovascular: Normal rate, regular rhythm and normal heart sounds. No murmur heard.   2+ symmetric pulses.   Pulmonary/Chest: Breath sounds normal. Does not exhibit tenderness.   Abdominal: Abdomen benign.   Musculoskeletal: Does not exhibit edema.   Neurological: Neurological exam unremarkable.   Vitals reviewed.    Data Review:   Lab Results   Component Value Date    GLUCOSE 93 08/15/2023    BUN 17 08/15/2023    CREATININE 0.55 (L) 08/15/2023    EGFRIFNONA >60 04/14/2022    EGFRIFAFRI >60 04/14/2022    BCR 30.9 (H) 08/15/2023    K 4.2 08/15/2023    CO2 25.0 08/15/2023    CALCIUM 9.1 08/15/2023    ALBUMIN 3.7 08/15/2023    AST 23 08/15/2023    ALT 22 08/15/2023     Lab Results   Component Value Date    CHOL 176 03/01/2023    TRIG 54 03/01/2023    HDL 78 (H) 03/01/2023    LDL 87 03/01/2023      Lab Results   Component Value Date    WBC 5.22 08/15/2023    RBC 4.36 08/15/2023    HGB 13.9 08/15/2023    HCT 42.3 08/15/2023    MCV 97.0 08/15/2023     08/15/2023     Lab Results   Component Value Date    TSH 0.488 10/06/2023     No results found for: \"HGBA1C\"       ECG 12 Lead    Date/Time: 10/11/2023 4:10 PM  Performed by: Katie Santana PA-C    Authorized by: Max, Katie, PA-C  Comparison: compared with previous ECG from 1/9/2020  Similar to previous ECG  Rhythm: sinus rhythm  Rate: normal  BPM: " 68  Conduction: left anterior fascicular block    Clinical impression: abnormal EKG                      Assessment:      Diagnosis Plan   1. Nonrheumatic aortic valve insufficiency  Adult Transthoracic Echo Complete w/ Color, Spectral and Contrast if necessary per protocol      2. Thoracic aortic aneurysm without rupture, unspecified part        3. Shortness of breath          Plan:   Echocardiogram ordered to reassess aortic valve due to history of insufficiency and new onset shortness of breath.  Echocardiogram to assess thoracic aortic aneurysm.  Continue current medications.   FU in 6 MO, sooner as needed.  Thank you for allowing us to participate in the care of your patient.         Katie Santana PA-C    Please note that portions of this note may have been completed with a voice recognition program. Efforts were made to edit the dictations, but occasionally words are mistranscribed.       show

## 2024-02-01 DIAGNOSIS — I35.1 NONRHEUMATIC AORTIC VALVE INSUFFICIENCY: ICD-10-CM

## 2024-02-01 DIAGNOSIS — I71.20 THORACIC AORTIC ANEURYSM WITHOUT RUPTURE, UNSPECIFIED PART: Primary | ICD-10-CM

## 2024-02-07 ENCOUNTER — TRANSCRIBE ORDERS (OUTPATIENT)
Dept: ADMINISTRATIVE | Facility: HOSPITAL | Age: 74
End: 2024-02-07
Payer: MEDICARE

## 2024-02-07 DIAGNOSIS — A31.0 MYCOBACTERIUM AVIUM COMPLEX: Primary | ICD-10-CM

## 2024-02-08 DIAGNOSIS — E03.9 ACQUIRED HYPOTHYROIDISM: Primary | ICD-10-CM

## 2024-02-13 ENCOUNTER — HOSPITAL ENCOUNTER (OUTPATIENT)
Dept: CT IMAGING | Facility: HOSPITAL | Age: 74
Discharge: HOME | End: 2024-02-13
Payer: MEDICARE

## 2024-02-13 ENCOUNTER — LAB (OUTPATIENT)
Dept: LAB | Facility: HOSPITAL | Age: 74
End: 2024-02-13
Payer: MEDICARE

## 2024-02-13 DIAGNOSIS — E03.9 ACQUIRED HYPOTHYROIDISM: ICD-10-CM

## 2024-02-13 DIAGNOSIS — A31.0 MYCOBACTERIUM AVIUM COMPLEX: ICD-10-CM

## 2024-02-13 PROCEDURE — 84443 ASSAY THYROID STIM HORMONE: CPT

## 2024-02-13 PROCEDURE — 71250 CT THORAX DX C-: CPT

## 2024-02-13 PROCEDURE — 84439 ASSAY OF FREE THYROXINE: CPT

## 2024-02-13 PROCEDURE — 36415 COLL VENOUS BLD VENIPUNCTURE: CPT

## 2024-02-13 PROCEDURE — 71250 CT THORAX DX C-: CPT | Performed by: RADIOLOGY

## 2024-02-14 LAB
T4 FREE SERPL-MCNC: 1.26 NG/DL (ref 0.93–1.7)
TSH SERPL DL<=0.05 MIU/L-ACNC: 0.37 UIU/ML (ref 0.27–4.2)

## 2024-03-05 ENCOUNTER — LAB (OUTPATIENT)
Dept: LAB | Facility: HOSPITAL | Age: 74
End: 2024-03-05
Payer: MEDICARE

## 2024-03-05 ENCOUNTER — TRANSCRIBE ORDERS (OUTPATIENT)
Dept: LAB | Facility: HOSPITAL | Age: 74
End: 2024-03-05
Payer: MEDICARE

## 2024-03-05 DIAGNOSIS — A31.0 MYCOBACTERIUM AVIUM-INTRACELLULARE COMPLEX: Primary | ICD-10-CM

## 2024-03-05 DIAGNOSIS — A31.0 MYCOBACTERIUM AVIUM-INTRACELLULARE COMPLEX: ICD-10-CM

## 2024-03-05 DIAGNOSIS — D80.1 COMMON VARIABLE AGAMMAGLOBULINEMIA: ICD-10-CM

## 2024-03-05 DIAGNOSIS — L03.011 CELLULITIS OF RIGHT THUMB: ICD-10-CM

## 2024-03-05 DIAGNOSIS — M65.141 OTHER INFECTIVE (TENO)SYNOVITIS, RIGHT HAND: ICD-10-CM

## 2024-03-05 LAB
ALBUMIN SERPL-MCNC: 3.8 G/DL (ref 3.5–5.2)
ALBUMIN/GLOB SERPL: 1.4 G/DL
ALP SERPL-CCNC: 57 U/L (ref 39–117)
ALT SERPL W P-5'-P-CCNC: 20 U/L (ref 1–33)
ANION GAP SERPL CALCULATED.3IONS-SCNC: 8 MMOL/L (ref 5–15)
AST SERPL-CCNC: 23 U/L (ref 1–32)
BASOPHILS # BLD AUTO: 0.05 10*3/MM3 (ref 0–0.2)
BASOPHILS NFR BLD AUTO: 1.1 % (ref 0–1.5)
BILIRUB SERPL-MCNC: 0.3 MG/DL (ref 0–1.2)
BUN SERPL-MCNC: 14 MG/DL (ref 8–23)
BUN/CREAT SERPL: 23.7 (ref 7–25)
CALCIUM SPEC-SCNC: 8.6 MG/DL (ref 8.6–10.5)
CHLORIDE SERPL-SCNC: 103 MMOL/L (ref 98–107)
CO2 SERPL-SCNC: 26 MMOL/L (ref 22–29)
CREAT SERPL-MCNC: 0.59 MG/DL (ref 0.57–1)
CRP SERPL-MCNC: <0.3 MG/DL (ref 0–0.5)
DEPRECATED RDW RBC AUTO: 45.6 FL (ref 37–54)
EGFRCR SERPLBLD CKD-EPI 2021: 95.9 ML/MIN/1.73
EOSINOPHIL # BLD AUTO: 0.08 10*3/MM3 (ref 0–0.4)
EOSINOPHIL NFR BLD AUTO: 1.8 % (ref 0.3–6.2)
ERYTHROCYTE [DISTWIDTH] IN BLOOD BY AUTOMATED COUNT: 12.9 % (ref 12.3–15.4)
ERYTHROCYTE [SEDIMENTATION RATE] IN BLOOD: 9 MM/HR (ref 0–30)
GLOBULIN UR ELPH-MCNC: 2.7 GM/DL
GLUCOSE SERPL-MCNC: 69 MG/DL (ref 65–99)
HCT VFR BLD AUTO: 41.1 % (ref 34–46.6)
HGB BLD-MCNC: 13.5 G/DL (ref 12–15.9)
IMM GRANULOCYTES # BLD AUTO: 0.01 10*3/MM3 (ref 0–0.05)
IMM GRANULOCYTES NFR BLD AUTO: 0.2 % (ref 0–0.5)
LYMPHOCYTES # BLD AUTO: 1.38 10*3/MM3 (ref 0.7–3.1)
LYMPHOCYTES NFR BLD AUTO: 31.3 % (ref 19.6–45.3)
MCH RBC QN AUTO: 31.4 PG (ref 26.6–33)
MCHC RBC AUTO-ENTMCNC: 32.8 G/DL (ref 31.5–35.7)
MCV RBC AUTO: 95.6 FL (ref 79–97)
MONOCYTES # BLD AUTO: 0.36 10*3/MM3 (ref 0.1–0.9)
MONOCYTES NFR BLD AUTO: 8.2 % (ref 5–12)
NEUTROPHILS NFR BLD AUTO: 2.53 10*3/MM3 (ref 1.7–7)
NEUTROPHILS NFR BLD AUTO: 57.4 % (ref 42.7–76)
NRBC BLD AUTO-RTO: 0 /100 WBC (ref 0–0.2)
PLATELET # BLD AUTO: 216 10*3/MM3 (ref 140–450)
PMV BLD AUTO: 12.1 FL (ref 6–12)
POTASSIUM SERPL-SCNC: 4.3 MMOL/L (ref 3.5–5.2)
PROT SERPL-MCNC: 6.5 G/DL (ref 6–8.5)
RBC # BLD AUTO: 4.3 10*6/MM3 (ref 3.77–5.28)
SODIUM SERPL-SCNC: 137 MMOL/L (ref 136–145)
WBC NRBC COR # BLD AUTO: 4.41 10*3/MM3 (ref 3.4–10.8)

## 2024-03-05 PROCEDURE — 36415 COLL VENOUS BLD VENIPUNCTURE: CPT

## 2024-03-05 PROCEDURE — 85652 RBC SED RATE AUTOMATED: CPT

## 2024-03-05 PROCEDURE — 86140 C-REACTIVE PROTEIN: CPT

## 2024-03-05 PROCEDURE — 85025 COMPLETE CBC W/AUTO DIFF WBC: CPT

## 2024-03-05 PROCEDURE — 80053 COMPREHEN METABOLIC PANEL: CPT

## 2024-03-07 ENCOUNTER — TRANSCRIBE ORDERS (OUTPATIENT)
Dept: ADMINISTRATIVE | Facility: HOSPITAL | Age: 74
End: 2024-03-07
Payer: MEDICARE

## 2024-03-07 DIAGNOSIS — Z12.31 VISIT FOR SCREENING MAMMOGRAM: Primary | ICD-10-CM

## 2024-03-11 ENCOUNTER — PATIENT MESSAGE (OUTPATIENT)
Dept: CARDIOLOGY | Facility: CLINIC | Age: 74
End: 2024-03-11
Payer: MEDICARE

## 2024-03-12 DIAGNOSIS — I71.20 THORACIC AORTIC ANEURYSM WITHOUT RUPTURE, UNSPECIFIED PART: Primary | ICD-10-CM

## 2024-03-18 ENCOUNTER — TELEPHONE (OUTPATIENT)
Dept: CARDIAC SURGERY | Facility: CLINIC | Age: 74
End: 2024-03-18

## 2024-03-18 NOTE — TELEPHONE ENCOUNTER
Caller: Fatmata Murdokc    Relationship: Self    Best call back number:     [phone]       What form or medical record are you requesting: MEDICAL RECORDS REQUEST FROM INFECTIOUS DISEASE FOR UPCOMING NEW PATIENT APPOINTMENT ON 4/16/24.     How would you like to receive the form or medical records (pick-up, mail, fax):     FAX REQUEST TO : [phone]  ATTENTION MEDICAL RECORDS.     Timeframe paperwork needed: BEFORE 4/16/29    Additional notes: PATIENT STATES THAT SHE CANNOT OBTAIN HER MEDICAL RECORDS WITHOUT A FEE UNLESS DR. HERNANDEZ SENDS A MEDICAL RECORD REQUEST.     PATIENT WOULD LIKE A CALL BACK.

## 2024-04-05 ENCOUNTER — TELEPHONE (OUTPATIENT)
Dept: OBSTETRICS AND GYNECOLOGY | Facility: CLINIC | Age: 74
End: 2024-04-05
Payer: MEDICARE

## 2024-04-05 NOTE — TELEPHONE ENCOUNTER
PATIENT STATES SHE WOULD LIKE TO REQUEST A REFILL OF estradiol (VIVELLE-DOT) 0.1 MG/24HR patch . SHE IS ON HER LAST BOX.    PHARMACY [facility] [id number] - [address] - [phone]  - [phone]  [phone]     CALL BACK [phone]

## 2024-04-05 NOTE — TELEPHONE ENCOUNTER
Pt states she has enough patches to last until her appt on 04/09 and Natty LEWIS will send in year refill. Pt VU

## 2024-04-09 ENCOUNTER — OFFICE VISIT (OUTPATIENT)
Dept: OBSTETRICS AND GYNECOLOGY | Facility: CLINIC | Age: 74
End: 2024-04-09
Payer: MEDICARE

## 2024-04-09 VITALS — BODY MASS INDEX: 20.12 KG/M2 | WEIGHT: 103 LBS | DIASTOLIC BLOOD PRESSURE: 72 MMHG | SYSTOLIC BLOOD PRESSURE: 118 MMHG

## 2024-04-09 DIAGNOSIS — Z01.419 WOMEN'S ANNUAL ROUTINE GYNECOLOGICAL EXAMINATION: Primary | ICD-10-CM

## 2024-04-09 DIAGNOSIS — Z90.711 HISTORY OF PARTIAL HYSTERECTOMY: ICD-10-CM

## 2024-04-09 DIAGNOSIS — N81.9 FEMALE GENITAL PROLAPSE, UNSPECIFIED TYPE: ICD-10-CM

## 2024-04-09 DIAGNOSIS — M81.0 AGE RELATED OSTEOPOROSIS, UNSPECIFIED PATHOLOGICAL FRACTURE PRESENCE: ICD-10-CM

## 2024-04-09 DIAGNOSIS — Z51.81 ENCOUNTER FOR MONITORING POSTMENOPAUSAL ESTROGEN REPLACEMENT THERAPY: ICD-10-CM

## 2024-04-09 DIAGNOSIS — N95.1 MENOPAUSAL STATE: ICD-10-CM

## 2024-04-09 DIAGNOSIS — Z79.890 ENCOUNTER FOR MONITORING POSTMENOPAUSAL ESTROGEN REPLACEMENT THERAPY: ICD-10-CM

## 2024-04-09 DIAGNOSIS — Z01.419 PAP TEST, AS PART OF ROUTINE GYNECOLOGICAL EXAMINATION: ICD-10-CM

## 2024-04-09 DIAGNOSIS — L29.2 VULVAR ITCHING: ICD-10-CM

## 2024-04-09 DIAGNOSIS — Z79.890 HORMONE REPLACEMENT THERAPY (HRT): ICD-10-CM

## 2024-04-09 DIAGNOSIS — Z12.31 BREAST CANCER SCREENING BY MAMMOGRAM: ICD-10-CM

## 2024-04-09 PROCEDURE — 1160F RVW MEDS BY RX/DR IN RCRD: CPT | Performed by: NURSE PRACTITIONER

## 2024-04-09 PROCEDURE — 99213 OFFICE O/P EST LOW 20 MIN: CPT | Performed by: NURSE PRACTITIONER

## 2024-04-09 PROCEDURE — 1159F MED LIST DOCD IN RCRD: CPT | Performed by: NURSE PRACTITIONER

## 2024-04-09 PROCEDURE — G0101 CA SCREEN;PELVIC/BREAST EXAM: HCPCS | Performed by: NURSE PRACTITIONER

## 2024-04-09 RX ORDER — AZITHROMYCIN 500 MG/1
TABLET, FILM COATED ORAL
COMMUNITY
Start: 2024-03-11

## 2024-04-09 RX ORDER — RIFAMPIN 300 MG/1
CAPSULE ORAL
COMMUNITY
Start: 2024-02-06

## 2024-04-09 RX ORDER — ESTRADIOL 0.1 MG/D
1 FILM, EXTENDED RELEASE TRANSDERMAL 2 TIMES WEEKLY
Qty: 24 PATCH | Refills: 3 | Status: SHIPPED | OUTPATIENT
Start: 2024-04-11

## 2024-04-09 RX ORDER — FLUCONAZOLE 150 MG/1
TABLET ORAL
Qty: 2 TABLET | Refills: 0 | Status: SHIPPED | OUTPATIENT
Start: 2024-04-09

## 2024-04-09 NOTE — PROGRESS NOTES
Gynecologic Annual Exam Note        GYN Annual Exam     Chief Complaint   Patient presents with    postmenopausal        Subjective     HPI  Fatmata Murdock is a 73 y.o. female, , who presents for annual well woman exam as a(n) established patient.  She is postmenopausal.  Patient denies vaginal bleeding. . 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 Marital Status: single.  She is not currently sexually active STD testing recommendations have been explained to the patient and she declines STD testing.    The patient would like to discuss the following complaints today: refills of HRT.  Pt. reports stress urinary incontinence and occasional vulvar itching.    Additional OB/GYN History     On HRT? Yes. Details: estradiol vivelle dot 0.1mg patch    Last Pap : .  Rev Pap recommendations and pt requests to have a Pap smear today.    History of abnormal Pap smear: no  Family history of uterine, colon, breast, or ovarian cancer: no  Performs monthly Self-Breast Exam: no  Last mammogram: 23. Done at .    Last Completed Mammogram            Scheduled - MAMMOGRAM (Every 2 Years) Scheduled for 2023  Mammo Screening Digital Tomosynthesis Bilateral With CAD    2022  Mammo Diagnostic Left With CAD    2021  Mammo Screening Digital Tomosynthesis Bilateral With CAD    2020  Mammo Screening Digital Tomosynthesis Bilateral With CAD    2020  MAMMO DIAGNOSTIC DIGITAL TOMOSYNTHESIS BILATERAL W CAD    Only the first 5 history entries have been loaded, but more history exists.                  Last colonoscopy: has had a colonoscopy 1 year ago    Last Completed Colonoscopy            COLORECTAL CANCER SCREENING (COLONOSCOPY - Every 10 Years) Next due on 2023  SCANNED - COLONOSCOPY                    Her last bone density scan was 24 ago and results were Osteoporosis.  She is being treated and monitored by [facility].  She has had Reclast and her DEXA has improved slightly.  Exercises Regularly: yes  Feelings of Anxiety or Depression: no      Tobacco Usage?: No       Current Outpatient Medications:     azithromycin (ZITHROMAX) 500 MG tablet, , Disp: , Rfl:     buPROPion SR (WELLBUTRIN SR) 100 MG 12 hr tablet, Take 2 tablets by mouth., Disp: , Rfl:     cholecalciferol (VITAMIN D3) 1.25 MG (59642 UT) capsule, Take 1 capsule by mouth Every 14 (Fourteen) Days., Disp: , Rfl:     [START ON 2024] estradiol (VIVELLE-DOT) 0.1 MG/24HR patch, Place 1 patch on the skin as directed by provider 2 (Two) Times a Week., Disp: 24 patch, Rfl: 3    levothyroxine (SYNTHROID, LEVOTHROID) 100 MCG tablet, Take 1 tablet by mouth Daily., Disp: 90 tablet, Rfl: 3    rifAMPin (RIFADIN) 300 MG capsule, , Disp: , Rfl:     fluconazole (Diflucan) 150 MG tablet, 1 po now and repeat in 3 days, Disp: 2 tablet, Rfl: 0    Unable to find, 1 each Every 30 (Thirty) Days. Med Name: Immunoglobulin infusion, Disp: , Rfl:     Patient is requesting refills of HRT.    OB History          14    Para   1    Term   1            AB   13    Living             SAB   11    IAB   2    Ectopic        Molar        Multiple        Live Births                    Past Medical History:   Diagnosis Date    Anemia May 1997    The anemia began due to ulcer.    Anxiety     Aortic regurgitation 10/6/2023    Arthritis 2005    Cervical disc disorder     Spine    Depression     Endometriosis     Fibroid     Fibroids     LEIOMYOMA OF UTERUS    H/O gastric ulcer     Heart valve disease      diagnosed regeration from the aerotic valve    Hepatitis A     Hypothyroidism     Iron deficiency     Leukopenia     Migraine     MRSA (methicillin resistant Staphylococcus aureus) 2014    No comments    MRSA infection     Osteopenia     Ovarian cyst     Pelvic pain     Routine gynecological examination     Thyroid condition     Vitamin B12  deficiency     Vitamin D deficiency         Past Surgical History:   Procedure Laterality Date    APPENDECTOMY      COLONOSCOPY      ESOPHAGUS SURGERY      HAND SURGERY Right 03/2020    HAND SURGERY Right     HYSTERECTOMY      OOPHORECTOMY      unilateral     OTHER SURGICAL HISTORY      SUBDURAL HEMATOMA REPAIR    SKIN CANCER EXCISION  08/01/2023    VAGOTOMY AND PYLOROPLASTY  1981       Health Maintenance   Topic Date Due    Pneumococcal Vaccine 65+ (1 of 2 - PCV) Never done    TDAP/TD VACCINES (1 - Tdap) Never done    ZOSTER VACCINE (1 of 2) Never done    RSV Vaccine - Adults (1 - 1-dose 60+ series) Never done    HEPATITIS C SCREENING  Never done    ANNUAL WELLNESS VISIT  Never done    COVID-19 Vaccine (3 - Pfizer risk series) 05/20/2021    INFLUENZA VACCINE  08/01/2024    MAMMOGRAM  01/27/2025    DXA SCAN  03/13/2025    COLORECTAL CANCER SCREENING  07/05/2033       The additional following portions of the patient's history were reviewed and updated as appropriate: allergies, current medications, past family history, past medical history, past social history, and past surgical history.    Review of Systems      I have reviewed and agree with the HPI, ROS, and historical information as entered above. Natty Clay, APRN           Objective   /72   Wt 46.7 kg (103 lb)   BMI 20.12 kg/m²     Physical Exam  Vitals and nursing note reviewed. Exam conducted with a chaperone present.   Constitutional:       General: She is not in acute distress.     Appearance: Normal appearance. She is well-developed and normal weight. She is not ill-appearing.   Neck:      Thyroid: No thyroid mass or thyromegaly.   Pulmonary:      Effort: Pulmonary effort is normal. No respiratory distress or retractions.   Chest:      Chest wall: No mass.   Breasts:     Right: Normal. No mass, nipple discharge, skin change or tenderness.      Left: Normal. No mass, nipple discharge, skin change or tenderness.   Abdominal:      General: There is  no distension.      Palpations: Abdomen is soft. Abdomen is not rigid. There is no mass.      Tenderness: There is no abdominal tenderness. There is no guarding or rebound.      Hernia: No hernia is present. There is no hernia in the left inguinal area.   Genitourinary:     General: Normal vulva.      Labia:         Right: No rash, tenderness or lesion.         Left: No rash, tenderness or lesion.       Urethra: No urethral pain or urethral swelling.      Vagina: Normal. Prolapsed vaginal walls present. No vaginal discharge or lesions.      Adnexa: Right adnexa normal and left adnexa normal.        Right: No mass or tenderness.          Left: No mass or tenderness.        Rectum: No external hemorrhoid.   Musculoskeletal:         General: No swelling.      Cervical back: No muscular tenderness.   Lymphadenopathy:      Upper Body:      Right upper body: No supraclavicular, axillary or pectoral adenopathy.      Left upper body: No supraclavicular, axillary or pectoral adenopathy.   Skin:     General: Skin is warm and dry.      Coloration: Skin is not jaundiced.   Neurological:      Mental Status: She is alert and oriented to person, place, and time.      Motor: No weakness.      Gait: Gait normal.   Psychiatric:         Mood and Affect: Mood normal.         Behavior: Behavior normal.         Thought Content: Thought content normal.         Judgment: Judgment normal.            Assessment and Plan    Problem List Items Addressed This Visit          Genitourinary and Reproductive     Encounter for monitoring postmenopausal estrogen replacement therapy    Relevant Medications    estradiol (VIVELLE-DOT) 0.1 MG/24HR patch (Start on 4/11/2024)    History of partial hysterectomy    Menopausal state       Musculoskeletal and Injuries    Age related osteoporosis     Other Visit Diagnoses       Women's annual routine gynecological examination    -  Primary    Pap test, as part of routine gynecological examination        Breast  cancer screening by mammogram        Relevant Orders    Mammo Screening Digital Tomosynthesis Bilateral With CAD    Vulvar itching        Relevant Medications    fluconazole (Diflucan) 150 MG tablet    Hormone replacement therapy (HRT)        Relevant Medications    estradiol (VIVELLE-DOT) 0.1 MG/24HR patch (Start on 4/11/2024)    Female genital prolapse, unspecified type                GYN annual well woman exam.   Reviewed monthly self breast exams.  Instructed to call with lumps, pain, or breast discharge.  Yearly mammograms ordered.  Ordered mammogram today.  Recommended use of Vitamin D and getting adequate calcium in her diet. (1500mg)  Reviewed exercise as a preventative health measures.   Colonoscopy recommended.  Reviewed risks of ERT including increased risk of breast cancer, increased blood clots, increased heart disease.  Patient strongly desires to stay on or start ERT.  She understands we will use the lowest amount that adequately controls her symptoms.  Recommended Flu Vaccine in Fall of each year.  RTC in 1 year or PRN with problems.  Return in about 1 year (around 4/9/2025) for Annual physical.  She requests refill of estrogen patch for bone density    Natty Clay, APRN  04/09/2024

## 2024-04-10 ENCOUNTER — TELEPHONE (OUTPATIENT)
Dept: OBSTETRICS AND GYNECOLOGY | Facility: CLINIC | Age: 74
End: 2024-04-10
Payer: MEDICARE

## 2024-04-10 LAB — REF LAB TEST METHOD: NORMAL

## 2024-04-10 NOTE — TELEPHONE ENCOUNTER
Pt calling because her prescriptions were sent to the wrong pharmacy and need to be sent to [facility] pharmacy which is listed in profile.

## 2024-04-10 NOTE — TELEPHONE ENCOUNTER
Spoke with patient. States she wants to use a different pharmacy. Advised her to contact the pharmacy and request to have the Rx transferred. She v/u and agreed.

## 2024-04-15 ENCOUNTER — TELEPHONE (OUTPATIENT)
Dept: CARDIOLOGY | Facility: CLINIC | Age: 74
End: 2024-04-15
Payer: MEDICARE

## 2024-04-15 NOTE — PROGRESS NOTES
"     [facility] Cardiothoracic Surgery New Patient Office Note     Date of Encounter: 2024     Name: Fatmata Murdock  : 1950     Referred By: Blanche Velasco,*  PCP: Ruth Caceres MD    Chief Complaint:    Chief Complaint   Patient presents with    Aortic Aneurysm     Referred by Dr. Velasco for ascending aortic aneurysm. Patient has occasional chest pain under left breast. Patient has noticed new shortness of breath when climbing stairs.        Subjective      History of Present Illness:    Fatmata Murdock is a 73 y.o. female referred to Dr. Chirinos per Cardiology, Dr. Blanche Velasco for 4.59 cm thoracic ascending aorta aneurysmal dilation per CT chest without contrast 2024.  PMH: Ascending thoracic aortic aneurysm, aortic regurgitation (moderate), family history CAD, osteoporosis, thoracic intradural tumor (followed by ), hypothyroid, right subdural hematoma s/p bur hole washout 2019 at [facility], hypogammaglobulinemia (followed by Dr. Smith w/ IgG infusions monthly), and duodenal ulcer s/p vagotomy.  No family history of aneurysmal disease.  No Hx hypertension.  Patient is active and walks regularly for exercise.  GIBBS most often associated w/ walking hills or stairs.  States chest pain occurs \"maybe three times a week\" and non-exertional.      Review of Systems:  Review of Systems   Constitutional: Negative for chills, decreased appetite, diaphoresis, fever, malaise/fatigue, night sweats, weight gain and weight loss.   HENT:  Negative for hoarse voice.    Eyes:  Negative for blurred vision, double vision and visual disturbance.   Cardiovascular:  Positive for chest pain and dyspnea on exertion. Negative for claudication, irregular heartbeat, leg swelling, near-syncope, orthopnea, palpitations, paroxysmal nocturnal dyspnea and syncope.   Respiratory:  Positive for cough. Negative for hemoptysis, shortness of breath, sputum production and wheezing.  "   Hematologic/Lymphatic: Negative for adenopathy and bleeding problem. Does not bruise/bleed easily.   Skin:  Negative for color change, nail changes, poor wound healing and rash.   Musculoskeletal:  Positive for joint pain. Negative for back pain, falls and muscle cramps.   Gastrointestinal:  Positive for heartburn (pain in mid chest at times after eating). Negative for abdominal pain and dysphagia.   Genitourinary:  Negative for flank pain.   Neurological:  Positive for dizziness and numbness (left foot). Negative for brief paralysis, disturbances in coordination, focal weakness, headaches, light-headedness, loss of balance, paresthesias, sensory change, vertigo and weakness.   Psychiatric/Behavioral:  Positive for depression. Negative for suicidal ideas. The patient is nervous/anxious.    Allergic/Immunologic: Negative for persistent infections.       I have reviewed the following portions of the patient's history: problem list, current medications, allergies, past surgical history, past medical history, past social history, past family history, and ROS and confirm it's accurate.    Allergies:  Allergies   Allergen Reactions    Ceftriaxone Nausea And Vomiting, Hives and Other (See Comments)    Levofloxacin Swelling     Pruritus and vision change    Codeine Itching    Effexor Xr [Venlafaxine Hcl Er] Other (See Comments)     Restless legs, dermatitis     Hydrocodone Unknown (See Comments)    Penicillins GI Intolerance    Sulfa Antibiotics GI Intolerance       Medications:      Current Outpatient Medications:     azithromycin (ZITHROMAX) 500 MG tablet, , Disp: , Rfl:     buPROPion SR (WELLBUTRIN SR) 200 MG 12 hr tablet, Take 1 tablet by mouth. 300mg a day, Disp: , Rfl:     cholecalciferol (VITAMIN D3) 1.25 MG (79394 UT) capsule, Take 1 capsule by mouth Every 14 (Fourteen) Days., Disp: , Rfl:     Cyanocobalamin (VITAMIN B-12 IJ), , Disp: , Rfl:     estradiol (VIVELLE-DOT) 0.1 MG/24HR patch, Place 1 patch on the skin  as directed by provider 2 (Two) Times a Week., Disp: 24 patch, Rfl: 3    FOLIC ACID PO, Take  by mouth., Disp: , Rfl:     levothyroxine (SYNTHROID, LEVOTHROID) 100 MCG tablet, Take 1 tablet by mouth Daily., Disp: 90 tablet, Rfl: 3    rifAMPin (RIFADIN) 300 MG capsule, , Disp: , Rfl:     Unable to find, 1 each Every 30 (Thirty) Days. Med Name: Immunoglobulin infusion, Disp: , Rfl:     History:   Past Medical History:   Diagnosis Date    Anemia May 1997    The anemia began due to ulcer.    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    Hypothyroidism     Iron deficiency     Leukopenia     Migraine     MRSA (methicillin resistant Staphylococcus aureus) September 2014    No comments    MRSA infection     Osteopenia     Ovarian cyst     Pelvic pain     Routine gynecological examination     Skin cancer     right leg    Subdural hematoma     Thyroid condition     Vitamin B12 deficiency     Vitamin D deficiency        Past Surgical History:   Procedure Laterality Date    APPENDECTOMY      COLONOSCOPY      ESOPHAGUS SURGERY      HAND SURGERY Right 03/2020    HAND SURGERY Right     HYSTERECTOMY      OOPHORECTOMY      unilateral     OTHER SURGICAL HISTORY      SUBDURAL HEMATOMA REPAIR    SKIN CANCER EXCISION  08/01/2023    VAGOTOMY AND PYLOROPLASTY  1981       Social History     Socioeconomic History    Marital status: Single    Number of children: 1   Tobacco Use    Smoking status: Never    Smokeless tobacco: Never   Vaping Use    Vaping status: Never Used   Substance and Sexual Activity    Alcohol use: Never    Drug use: Never    Sexual activity: Not Currently     Partners: Male     Birth control/protection: Surgical, Post-menopausal, Hysterectomy     Comment:  Hysterectomy        Family History   Problem Relation Age of Onset    Depression Mother         Mom    Thyroid disease Mother     No Known Problems Father     Hypertension Sister     Arthritis Sister     Depression Sister     Anxiety disorder Sister     Hypertension Sister     Thyroid disease Maternal Aunt     Anxiety disorder Daughter     Breast cancer Neg Hx     Ovarian cancer Neg Hx     Uterine cancer Neg Hx     Colon cancer Neg Hx     Melanoma Neg Hx     Prostate cancer Neg Hx     Deep vein thrombosis Neg Hx     Pulmonary embolism Neg Hx     Coronary artery disease Neg Hx     Stroke Neg Hx     Osteoporosis Neg Hx     Diabetes Neg Hx        Objective   Physical Exam:  Vitals:    04/16/24 1248   BP: 150/70   BP Location: Right arm   Patient Position: Sitting   Pulse: 68   Temp: 98 °F (36.7 °C)   SpO2: 98%   Weight: 47.5 kg (104 lb 12.8 oz)      Body mass index is 20.47 kg/m².    Physical Exam  Vitals reviewed.   Constitutional:       General: She is not in acute distress.     Appearance: She is well-developed and well-groomed.   HENT:      Head: Normocephalic and atraumatic.   Eyes:      General: Lids are normal.      Conjunctiva/sclera: Conjunctivae normal.   Neck:      Vascular: No carotid bruit.   Cardiovascular:      Rate and Rhythm: Normal rate and regular rhythm.      Pulses:           Carotid pulses are 2+ on the right side and 2+ on the left side.       Radial pulses are 2+ on the right side and 2+ on the left side.        Dorsalis pedis pulses are 2+ on the right side and 2+ on the left side.        Posterior tibial pulses are 2+ on the right side and 2+ on the left side.      Heart sounds: S1 normal and S2 normal. Murmur heard.      Diastolic murmur is present with a grade of 2/4.   Pulmonary:      Effort: Pulmonary effort is normal. No respiratory distress.      Breath sounds: Normal breath sounds.   Musculoskeletal:         General: Normal range of motion.      Cervical back: Normal range of motion and  neck supple.      Right lower leg: No edema.      Left lower leg: No edema.   Skin:     General: Skin is warm and dry.      Capillary Refill: Capillary refill takes less than 2 seconds.   Neurological:      General: No focal deficit present.      Mental Status: She is alert and oriented to person, place, and time.      GCS: GCS eye subscore is 4. GCS verbal subscore is 5. GCS motor subscore is 6.   Psychiatric:         Attention and Perception: Attention normal.         Mood and Affect: Mood normal.         Speech: Speech normal.         Behavior: Behavior is cooperative.         Imaging/Labs:  CT Chest Without Intravenous Contrast: 2/13/2024 (Personally reviewed and measure ascending aorta 4.5 cm)  COMPARISON:  3/15/2022   FINDINGS:    Lungs and pleural spaces:  Calcified scar/fibrosis right upper lobe is  stable and is along the right minor fissure.  Lungs are essentially  clear with no focal airspace disease or centrilobular nodules as would  be expected with atypical infectious etiology.  No consolidation.  No  significant effusion.    Heart:  Mild cardiomegaly.  No significant pericardial effusion.  No  significant coronary artery calcifications.    Mediastinum:  Unremarkable as visualized.  No hilar or mediastinal  lymphadenopathy.    Bones/joints:  Unremarkable as visualized.  No acute fracture.  No  dislocation. Stable calcification at the T4/5 level extending into the  spinal canal. Either old calcified disc protrusion or possibly calcified  meningioma.    Soft tissues:  Unremarkable as visualized.    Vasculature:  4.59 cm transverse diameter ascending thoracic aorta  constitutes aneurysmal dilatation.  Tortuous thoracic aorta, stable.    Lymph nodes:  See above.    Liver:  Stable hepatic cysts.    Kidneys and ureters:  Nonobstructing left kidney stone. No  hydronephrosis.    Stomach and bowel:  Postsurgical changes GE junction region.   IMPRESSION:  1.  Lungs are essentially clear with no focal airspace  disease or  centrilobular nodules as would be expected with atypical infectious  etiology.  2.  No hilar or mediastinal lymphadenopathy.  3.  4.59 cm transverse diameter ascending thoracic aorta constitutes  aneurysmal dilatation.  4.  Calcified scar/fibrosis right upper lobe is stable and is along the  right minor fissure.  5.  Nonobstructing left kidney stone. No hydronephrosis.  6.  Mild cardiomegaly. Other nonacute findings as above.   This report was finalized on 2/13/2024 2:10 PM by Dr. Carter Cobos MD.    Transthoracic Echocardiogram Findings: 10/11/2023  Left Ventricle Left ventricular systolic function is normal. Estimated left ventricular EF = 55%     Normal left ventricular cavity size and wall thickness noted. All left ventricular wall segments contract normally. Left ventricular diastolic function was normal.   Right Ventricle Normal right ventricular cavity size, wall thickness, systolic function and septal motion noted.   Left Atrium Normal left atrial size and volume noted.   Right Atrium The right atrial cavity is borderline dilated. Right atrial volume is 88 ml.   Aortic Valve The aortic valve is structurally normal with no stenosis present. The aortic valve appears trileaflet. Moderate aortic valve regurgitation is present.   Mitral Valve The mitral valve is structurally normal with no significant stenosis present. Mild mitral valve regurgitation is present. 2 MR Jets   Tricuspid Valve The tricuspid valve is structurally normal with no significant stenosis present. Mild tricuspid valve regurgitation is present. Calculated right ventricular systolic pressure from tricuspid regurgitation is 19 mmHg.   Pulmonic Valve The pulmonic valve is structurally normal with no significant stenosis present. There is mild pulmonic valve regurgitation present.   Greater Vessels No dilation of the aortic root is present. No dilation of the sinuses of Valsalva is present. BSA 1.39.  Aortic size index 2.8 cm/m² The  inferior vena cava is normally sized. Normal IVC inspiratory collapse of greater than 50% noted.   Pericardium The pericardium is normal. There is no evidence of pericardial effusion. .       CT CHEST W CONTRAST DIAGNOSTIC- 3/15/2022 (Personally reviewed and agree w/ ascending aorta 4.0 cm)  INDICATIONS: aortic root dilation; I77.810-Thoracic aortic ectasia   FINDINGS:   Aortic assessment of the aortic root and ascending aorta on this exam is  partially limited due to cardiac motion. The aortic diameter at the  aortic root measures 2.8 cm. The aorta at the sinus of Valsalva measures  approximately 3.8 cm. The aortic diameter at sinotubular junction  measures 3.5 cm. The ascending aorta at the level of the main pulmonary  artery measures 4 cm. The aortic arch beyond the subclavian artery  ostium measures 2.7 cm in diameter. The mid descending thoracic aorta  measures 2.7 cm in diameter at the level of the left main pulmonary  artery. The descending thoracic aorta at the level of the diaphragmatic  juwan measures 2.5 cm in diameter.     Negative for pulmonary embolus. No mediastinal, hilar, or axillary  adenopathy. The trachea and mainstem bronchi are patent. There is a  small linear area of airspace disease abutting the minor fissure in the  right upper lobe likely chronic scarring on axial image 33. No findings  of pneumonia. No pneumothorax. Negative for pleural effusion.     Upper abdomen demonstrates several low-density hepatic lesions likely  cysts for example left hepatic lobe measuring 15 mm. The adrenal glands  are unremarkable. The spleen is without acute abnormality. Fluid-filled  distention of stomach and duodenum partially imaged. The upper poles of  the kidneys appear unremarkable. Multiple surgical clips at the GE  junction. There is S-shaped thoracolumbar scoliosis. Negative for acute  fracture. On the right at T4-5 level there is a calcified right  paracentral structure projecting into the canal  "measuring 12 x 9 x 5 mm  which could relate to calcified chronic disc extrusion or meningioma.  This deviates the spinal cord to the left contributing to canal  stenosis.   IMPRESSION:  1. Ectasia of the ascending aorta measuring 4 cm at the level of the  main pulmonary artery. Complete assessment of the aortic root and  ascending aorta partially limited due to cardiac motion.  2. No acute intrathoracic process.  3. Right paracentral calcified disc extrusion versus meningioma at T4-5  level, correlate for any associated clinical symptoms. If further  concern clinically, this could be further assessed with thoracic spine  MRI.   This report was finalized on 3/15/2022 1:28 PM by Bear Andrade MD.    CT Calcium Scoring 1/31/2020 @ [facility]  Impression:  1.  Coronary calcium score of 4 with punctate calcification in the LAD  2.  Enlargement of the ascending aorta measuring up to 4.1 cm consistent with aneurysmal enlargement  3.  Cardiomegaly    Assessment / Plan      Assessment / Plan:  1. Aneurysm of ascending aorta without rupture  2. Nonrheumatic aortic valve insufficiency, moderate  - 73 y.o. female referred to Dr. Chirinos per Cardiology, Dr. Blanche Velasco, for 4.59 cm thoracic ascending aorta aneurysmal dilation per CT chest without contrast 2/13/2024.    - PMH: Ascending thoracic aortic aneurysm, aortic regurgitation (moderate), family history CAD, osteoporosis, thoracic intradural tumor (followed by ), hypothyroid, right subdural hematoma s/p bur hole washout 7/30/2019 at [facility], hypogammaglobulinemia (followed by Dr. Smith w/ IgG infusions monthly), and duodenal ulcer s/p vagotomy.   - No family history of aneurysmal disease.    - No Hx hypertension.    - Lifelong non-smoker  - GIBBS most often associated w/ walking hills or stairs.  States chest pain occurs \"maybe three times a week\" and non-exertional.     - Discussed TAA in general as well as interventional consideration generally @ 5.0 cm or " greater.  However, considering her BSA 1.4 m2 will review w/ Dr. Chirinos.       Follow Up:   Return pending recommendations per Dr. Chirinos.   Or sooner for any further concerns or worsening sign and symptoms. If unable to reach us in the office please dial 911 or go to the nearest emergency department.      JOSHUA Duff  [facility] Cardiothoracic Surgery    Time Spent: I spent 35 minutes caring for Fatmata on this date of service. This time includes time spent by me in the following activities: preparing for the visit, reviewing tests, obtaining and/or reviewing a separately obtained history, performing a medically appropriate examination and/or evaluation, counseling and educating the patient/family/caregiver, documenting information in the medical record, independently interpreting results and communicating that information with the patient/family/caregiver, and care coordination.

## 2024-04-15 NOTE — TELEPHONE ENCOUNTER
Patient left voice mail message requesting a return call.  She states she had an appointment with Dr. Chirinos and it was cancelled because he had an emergency.  She wants to know if [facility] thinks it is ok to be seen by a APRN in that office.  Spoke with patient.  She is encouraged to see the APRN in [facility] and to ask her any questions she has for Cand[place].  She verbalizes understanding.

## 2024-04-16 ENCOUNTER — OFFICE VISIT (OUTPATIENT)
Dept: CARDIAC SURGERY | Facility: CLINIC | Age: 74
End: 2024-04-16
Payer: MEDICARE

## 2024-04-16 VITALS
HEART RATE: 68 BPM | BODY MASS INDEX: 20.47 KG/M2 | DIASTOLIC BLOOD PRESSURE: 70 MMHG | TEMPERATURE: 98 F | WEIGHT: 104.8 LBS | SYSTOLIC BLOOD PRESSURE: 150 MMHG | OXYGEN SATURATION: 98 %

## 2024-04-16 DIAGNOSIS — I35.1 NONRHEUMATIC AORTIC VALVE INSUFFICIENCY: ICD-10-CM

## 2024-04-16 DIAGNOSIS — I71.21 ANEURYSM OF ASCENDING AORTA WITHOUT RUPTURE: Primary | ICD-10-CM

## 2024-04-19 ENCOUNTER — TELEPHONE (OUTPATIENT)
Dept: ENDOCRINOLOGY | Facility: CLINIC | Age: 74
End: 2024-04-19

## 2024-04-19 DIAGNOSIS — E03.9 ACQUIRED HYPOTHYROIDISM: Primary | ICD-10-CM

## 2024-04-19 NOTE — TELEPHONE ENCOUNTER
Patient notified and verbalized understanding.  She states she has an appointment next Friday at a [facility] and would like to have labs done there.

## 2024-04-19 NOTE — TELEPHONE ENCOUNTER
Rifampin can accelerate the breakdown of levothyroxine so It would be reasonable to check thyroid within the next couple of week. Where would she want to do the labs? Does she want us to send her an order

## 2024-04-19 NOTE — TELEPHONE ENCOUNTER
Patient states that they are taking a medication for a bacterial infection, and was told that it would reduce the effectiveness of Synthroid, so she is asking if she needs to get her thyroid checked.    Medications:   Rifampin 300mg  Azithromyacin (?) 500mg      Will be busy until 12:00pm

## 2024-04-26 ENCOUNTER — LAB (OUTPATIENT)
Dept: LAB | Facility: HOSPITAL | Age: 74
End: 2024-04-26
Payer: MEDICARE

## 2024-04-26 ENCOUNTER — HOSPITAL ENCOUNTER (OUTPATIENT)
Dept: MAMMOGRAPHY | Facility: HOSPITAL | Age: 74
Discharge: HOME | End: 2024-04-26
Payer: MEDICARE

## 2024-04-26 DIAGNOSIS — E03.9 ACQUIRED HYPOTHYROIDISM: ICD-10-CM

## 2024-04-26 DIAGNOSIS — Z12.31 VISIT FOR SCREENING MAMMOGRAM: ICD-10-CM

## 2024-04-26 LAB
T4 FREE SERPL-MCNC: 0.97 NG/DL (ref 0.93–1.7)
TSH SERPL DL<=0.05 MIU/L-ACNC: 10.2 UIU/ML (ref 0.27–4.2)

## 2024-04-26 PROCEDURE — 84443 ASSAY THYROID STIM HORMONE: CPT

## 2024-04-26 PROCEDURE — 77063 BREAST TOMOSYNTHESIS BI: CPT

## 2024-04-26 PROCEDURE — 84439 ASSAY OF FREE THYROXINE: CPT

## 2024-04-26 PROCEDURE — 77067 SCR MAMMO BI INCL CAD: CPT

## 2024-04-29 DIAGNOSIS — E03.9 ACQUIRED HYPOTHYROIDISM: Primary | ICD-10-CM

## 2024-04-29 RX ORDER — LEVOTHYROXINE SODIUM 0.12 MG/1
125 TABLET ORAL DAILY
Qty: 90 TABLET | Refills: 3 | Status: SHIPPED | OUTPATIENT
Start: 2024-04-29 | End: 2025-04-29

## 2024-04-29 RX ORDER — LEVOTHYROXINE SODIUM 0.12 MG/1
125 TABLET ORAL DAILY
Qty: 90 TABLET | Refills: 3 | Status: SHIPPED | OUTPATIENT
Start: 2024-04-29 | End: 2024-04-29 | Stop reason: SDUPTHER

## 2024-04-29 NOTE — TELEPHONE ENCOUNTER
Please call in for pt he wanted to increase this dose and it is ok to do this per the pt -she was responding to the my chart message

## 2024-04-29 NOTE — ADDENDUM NOTE
Patient Name: Lily Unger  : 1957  MRN: [id number]    Date of Admission: 10/22/2023  Date of Discharge:  10/27/2023  Primary Care Physician: Jaycob Savage MD      Chief Complaint:   Abdominal Pain, Nausea, and Vomiting      Discharge Diagnoses     Active Hospital Problems    Diagnosis  POA    **Abdominal pain [R10.9]  Yes    Nausea and vomiting [R11.2]  Yes    Microcytic anemia [D50.9]  Yes    Hyperlipidemia [E78.5]  Yes    CAD (coronary artery disease) [I25.10]  Yes    CKD (chronic kidney disease) stage 3, GFR 30-59 ml/min [N18.30]  Yes    Type 2 diabetes mellitus [E11.9]  Yes    Hypertension [I10]  Yes      Resolved Hospital Problems   No resolved problems to display.        Hospital Course     Ms. Unger is a 66 y.o. female with a history of diabetes mellitus type 2, hypertension, obesity, CAD, CKD stage III who presented to [facility] initially complaining of abdominal pain, nausea, vomiting.  Please see the admitting history and physical for further details.  She was found to have abdominal pain, nausea, vomiting most likely due to semaglutide and was admitted to the hospital for further evaluation and treatment.      EGD on 10/25/2023 unremarkable.  Pathology pending results.  Gastroenterology approved discharge with outpatient follow-up in 2 to 4 weeks to discuss biopsy results and schedule outpatient colonoscopy.    Cardiology followed hypertension and made adjustments to antihypertensive agents to include continued bisoprolol with added hydralazine 3 times daily.  Blood pressures improved with recommendation for outpatient follow-up 2023 at 11:00.    Patient tolerating intake and physical activity--appears medically stable for discharge home on 10/27/2023 and agrees with plan for follow-up as previously discussed to include primary care provider for continued monitoring glucose/DM management.    Day of Discharge     Physical Exam:  Temp:  [97.8 °F (36.6  Addended by: OZ LANDERS on: 4/29/2024 03:53 PM     Modules accepted: Orders     °C)-98.3 °F (36.8 °C)] 98.3 °F (36.8 °C)  Heart Rate:  [57-76] 63  Resp:  [16-18] 18  BP: (157-196)/(66-91) 163/71  Body mass index is 32.44 kg/m².    Physical Exam  Constitutional:       General: She is not in acute distress.     Appearance: She is obese. She is not toxic-appearing.   HENT:      Head: Normocephalic and atraumatic.   Eyes:      Extraocular Movements: Extraocular movements intact.      Conjunctiva/sclera: Conjunctivae normal.   Cardiovascular:      Rate and Rhythm: Normal rate.      Heart sounds: Normal heart sounds.   Pulmonary:      Effort: Pulmonary effort is normal.      Breath sounds: Normal breath sounds.   Abdominal:      General: Bowel sounds are normal. There is no distension.      Palpations: Abdomen is soft.      Tenderness: There is no abdominal tenderness. There is no guarding.   Musculoskeletal:      Cervical back: Normal range of motion and neck supple.      Right lower leg: No edema.      Left lower leg: No edema.   Skin:     General: Skin is warm and dry.   Neurological:      Mental Status: She is alert and oriented to person, place, and time.      Cranial Nerves: No cranial nerve deficit.   Psychiatric:         Behavior: Behavior normal.         Thought Content: Thought content normal.         Consultants     Consult Orders (all) (From admission, onward)       Start     Ordered    10/26/23 0842  Inpatient Cardiology Consult  Once        Specialty:  Cardiology  Provider:  Tay Chatman MD    10/26/23 0842    10/23/23 1427  Inpatient Hospitalist Consult  Once        Specialty:  Hospitalist  Provider:  Malissa Lieberman MD    10/23/23 1428    10/23/23 1053  Inpatient Gastroenterology Consult  Once        Specialty:  Gastroenterology  Provider:  Hudson Hicks MD    10/23/23 1052    10/22/23 2349  Inpatient Nutrition Consult  Once        Provider:  (Not yet assigned)    10/22/23 2348    10/22/23 2215  Inpatient Gastroenterology Consult  Once,   Status:  Canceled         Specialty:  Gastroenterology  Provider:  Brett Cool MD    10/22/23 2215                  Procedures     ESOPHAGOGASTRODUODENOSCOPY with cold biopsies    Imaging Results (All)       Procedure Component Value Units Date/Time    CT Abdomen Pelvis Without Contrast [[id number]] Collected: 10/22/23 2154     Updated: 10/22/23 2232    Narrative:      CT OF THE ABDOMEN AND PELVIS WITHOUT CONTRAST 10/22/2023     HISTORY: Abdominal pain. Vomiting.     Spiral images were obtained from the lung bases to the symphysis pubis.  No intravenous or oral contrast was given.     The liver, gallbladder, spleen, pancreas, adrenals and kidneys appear  unremarkable. Radiopaque mesh is seen in the anterior leftward abdomen  from previous hernia repair. No bowel wall thickening or bowel  dilatation is seen. Uterus has been removed. Urinary bladder is  unremarkable.       Impression:      1. Status post hysterectomy and ventral hernia repair.  2. No acute process identified.     Radiation dose reduction techniques were utilized, including automated  exposure control and exposure modulation based on body size.        This report was finalized on 10/22/2023 10:29 PM by Dr. Noe Powell M.D on Workstation: [id number]             Results for orders placed during the hospital encounter of 03/16/23    Duplex Carotid Ultrasound CAR    Interpretation Summary    Right internal carotid artery demonstrates a 50-69% stenosis.    Left internal carotid artery demonstrates a less than 50% stenosis.    Results for orders placed in visit on 02/17/23    Adult Transthoracic Echo Complete W/ Cont if Necessary Per Protocol    Interpretation Summary    Left ventricular ejection fraction appears to be 61 - 65%.    Left ventricular diastolic function was normal.    Estimated right ventricular systolic pressure from tricuspid regurgitation is normal (<35 mmHg).    Pertinent Labs     Results from last 7 days   Lab Units 10/27/23  0630 10/26/23  9791  "10/25/23  0522 10/23/23  0317   WBC 10*3/mm3 9.25 8.47 9.20 8.13   HEMOGLOBIN g/dL 10.5* 11.3* 11.7* 10.1*   PLATELETS 10*3/mm3 309 322 351 262     Results from last 7 days   Lab Units 10/27/23  0630 10/26/23  0436 10/25/23  0522 10/23/23  0317   SODIUM mmol/L 139 140 138 141   POTASSIUM mmol/L 3.6 3.8 4.0 3.4*   CHLORIDE mmol/L 105 108* 107 106   CO2 mmol/L 27.0 25.0 22.4 26.0   BUN mg/dL 9 5* 4* 7*   CREATININE mg/dL 0.91 0.76 0.71 0.77   GLUCOSE mg/dL 161* 125* 118* 162*   EGFR mL/min/1.73 70.2 87.1 94.5 85.7     Results from last 7 days   Lab Units 10/22/23  2057   ALBUMIN g/dL 4.2   BILIRUBIN mg/dL 0.2   ALK PHOS U/L 69   AST (SGOT) U/L 24   ALT (SGPT) U/L 27     Results from last 7 days   Lab Units 10/27/23  0630 10/26/23  0436 10/25/23  0522 10/23/23  0317 10/22/23  2057   CALCIUM mg/dL 9.5 9.2 9.2 8.6 9.5   ALBUMIN g/dL  --   --   --   --  4.2   MAGNESIUM mg/dL 2.2  --   --  2.0  --    PHOSPHORUS mg/dL 2.9  --   --   --   --      Results from last 7 days   Lab Units 10/22/23  2057   LIPASE U/L 25             Invalid input(s): \"LDLCALC\"  Results from last 7 days   Lab Units 10/22/23  2147   URINECX  25,000 CFU/mL Escherichia coli*         Test Results Pending at Discharge     Pending Labs       Order Current Status    Tissue Pathology Exam In process            Discharge Details        Discharge Medications        New Medications        Instructions Start Date   famotidine 20 MG tablet  Commonly known as: PEPCID   20 mg, Oral, 2 Times Daily Before Meals      hydrALAZINE 25 MG tablet  Commonly known as: APRESOLINE   25 mg, Oral, Every 8 Hours Scheduled      ondansetron 4 MG tablet  Commonly known as: ZOFRAN   4 mg, Oral, Every 6 Hours PRN             Continue These Medications        Instructions Start Date   aspirin 81 MG EC tablet   81 mg, Oral, Daily      bisoprolol 5 MG tablet  Commonly known as: ZEBeta   5 mg, Oral, Daily      Magnesium Oxide -Mg Supplement 400 (240 Mg) MG tablet       metFORMIN 1000 MG " tablet  Commonly known as: GLUCOPHAGE   1,000 mg, Oral, 2 Times Daily      nitroglycerin 0.4 MG SL tablet  Commonly known as: NITROSTAT   0.4 mg, Sublingual, See Admin Instructions      omeprazole 40 MG capsule  Commonly known as: priLOSEC   40 mg, Oral, Daily      ondansetron ODT 4 MG disintegrating tablet  Commonly known as: ZOFRAN-ODT   4 mg, Translingual, 4 Times Daily PRN      OneTouch Delica Lancets 30G misc   TEST 1 (ONE) EACH DAILY      OneTouch Verio test strip  Generic drug: glucose blood   No dose, route, or frequency recorded.      pregabalin 150 MG capsule  Commonly known as: LYRICA   150 mg, Oral, 2 Times Daily      rosuvastatin 20 MG tablet  Commonly known as: CRESTOR   20 mg, Oral, Daily      SUMAtriptan 50 MG tablet  Commonly known as: IMITREX   50 mg, Oral, Every 2 Hours PRN, Take one tablet at onset of headache. May repeat dose one time in 2 hours if headache not relieved.       Tresiba FlexTouch 100 UNIT/ML solution pen-injector injection  Generic drug: insulin degludec   25 Units.               Allergies   Allergen Reactions    Hydrocodone Nausea And Vomiting and GI Intolerance    Penicillins Hives    Oxybutynin GI Intolerance     vomitting    Ozempic (0.25 Or 0.5 Mg-Dose) [Semaglutide(0.25 Or 0.5mg-Dos)] GI Intolerance     Vomiting     Plavix [Clopidogrel Bisulfate] Nausea And Vomiting       Discharge Disposition:  Home or Self Care      Discharge Diet:  Diet Order   Procedures    Diet: Gastrointestinal Diets, Diabetic Diets; Consistent Carbohydrate; Fiber-Restricted; Texture: Regular Texture (IDDSI 7); Fluid Consistency: Thin (IDDSI 0)       Discharge Activity:   Activity Instructions       Activity as Tolerated              CODE STATUS:    Code Status and Medical Interventions:   Ordered at: 10/22/23 3048     Code Status (Patient has no pulse and is not breathing):    CPR (Attempt to Resuscitate)     Medical Interventions (Patient has pulse or is breathing):    Full Support       Future  Appointments   Date Time Provider Department Center   11/28/2023 11:00 AM Daysi Daigle APRN [facility]   2/9/2024 10:15 AM Daysi Daigle APRN [facility]     Additional Instructions for the Follow-ups that You Need to Schedule       Discharge Follow-up with PCP   As directed       Currently Documented PCP:    Jaycob Savage MD    PCP Phone Number:    [phone]     Follow Up Details: Please call to schedule a 1 week or earliest available follow-up appointment PCP; BP, HR monitor, dietary tolerance               Follow-up Information       Jaycob Savage MD .    Specialty: Family Medicine  Why: Please call to schedule a 1 week or earliest available follow-up appointment PCP; BP, HR monitor, dietary tolerance  Contact information:  [address] [place] [address]  [phone]               Daysi Daigle APRN Follow up on 11/28/2023.    Specialties: Nurse Practitioner, Cardiology  Why: 11 AM  Contact information:  [address]  [place] [address]  [phone]               Reshma Duque MD. Call.    Specialty: Gastroenterology  Why: office will contact you to schedule colonoscopy  Contact information:  [address]  [place] [address]  [phone]                             Additional Instructions for the Follow-ups that You Need to Schedule       Discharge Follow-up with PCP   As directed       Currently Documented PCP:    Jaycob Savage MD    PCP Phone Number:    [phone]     Follow Up Details: Please call to schedule a 1 week or earliest available follow-up appointment PCP; BP, HR monitor, dietary tolerance            Time Spent on Discharge:  Greater than 30 minutes      BRETT Gottlieb  [facility]  10/27/23  11:15 EDT

## 2024-04-29 NOTE — TELEPHONE ENCOUNTER
PATIENT STATES THAT THIS RX WAS TO GO TO [facility], NOT LOCAL PHARMACY.

## 2024-05-02 ENCOUNTER — TELEPHONE (OUTPATIENT)
Dept: OBSTETRICS AND GYNECOLOGY | Facility: CLINIC | Age: 74
End: 2024-05-02
Payer: MEDICARE

## 2024-05-02 NOTE — TELEPHONE ENCOUNTER
Pt is calling because she has a lump coming out of her vagina Pt has complaints of pain in urination. Pt would like to know what she should do.

## 2024-05-02 NOTE — TELEPHONE ENCOUNTER
Patient last OV 4/9/24 with Leonor. vaginal wall prolapse present on exam but was not protruding outside of body. Patient reports now it is. Patient would like to come in to discuss possible surgery. Appointment scheduled. Advised patient for bleeding, devere pain, inability to empty bowel/bladder she should go to ER.  Patient v/u.

## 2024-05-03 ENCOUNTER — TELEPHONE (OUTPATIENT)
Dept: NEUROSURGERY | Facility: CLINIC | Age: 74
End: 2024-05-03
Payer: MEDICARE

## 2024-05-06 ENCOUNTER — HOSPITAL ENCOUNTER (OUTPATIENT)
Dept: MRI IMAGING | Facility: HOSPITAL | Age: 74
Discharge: HOME | End: 2024-05-06
Admitting: STUDENT IN AN ORGANIZED HEALTH CARE EDUCATION/TRAINING PROGRAM
Payer: MEDICARE

## 2024-05-06 ENCOUNTER — OFFICE VISIT (OUTPATIENT)
Dept: OBSTETRICS AND GYNECOLOGY | Facility: CLINIC | Age: 74
End: 2024-05-06
Payer: MEDICARE

## 2024-05-06 VITALS
BODY MASS INDEX: 20.62 KG/M2 | DIASTOLIC BLOOD PRESSURE: 70 MMHG | WEIGHT: 105 LBS | SYSTOLIC BLOOD PRESSURE: 128 MMHG | HEIGHT: 60 IN

## 2024-05-06 DIAGNOSIS — Z90.711 HISTORY OF PARTIAL HYSTERECTOMY: ICD-10-CM

## 2024-05-06 DIAGNOSIS — N81.4 VAGINAL AND CERVICAL PROLAPSE: Primary | ICD-10-CM

## 2024-05-06 DIAGNOSIS — D49.2 THORACIC SPINE TUMOR: ICD-10-CM

## 2024-05-06 DIAGNOSIS — I71.21 ANEURYSM OF ASCENDING AORTA WITHOUT RUPTURE: ICD-10-CM

## 2024-05-06 DIAGNOSIS — N81.6 CYSTOCELE WITH RECTOCELE: ICD-10-CM

## 2024-05-06 DIAGNOSIS — N81.10 CYSTOCELE WITH RECTOCELE: ICD-10-CM

## 2024-05-06 PROCEDURE — A9577 INJ MULTIHANCE: HCPCS | Performed by: STUDENT IN AN ORGANIZED HEALTH CARE EDUCATION/TRAINING PROGRAM

## 2024-05-06 PROCEDURE — 0 GADOBENATE DIMEGLUMINE 529 MG/ML SOLUTION: Performed by: STUDENT IN AN ORGANIZED HEALTH CARE EDUCATION/TRAINING PROGRAM

## 2024-05-06 PROCEDURE — 72157 MRI CHEST SPINE W/O & W/DYE: CPT

## 2024-05-06 RX ADMIN — GADOBENATE DIMEGLUMINE 9 ML: 529 INJECTION, SOLUTION INTRAVENOUS at 12:23

## 2024-05-06 NOTE — PROGRESS NOTES
Chief Complaint   Patient presents with    Follow-up     Discuss prolapse surgery       Subjective   HPI  Fatmata Murdock is a 74 y.o. female, , who presents for initial evaluation of pelvic organ prolapse.    She states she has protrusion from vagina, low back pain, and a pulling sensation in her inguinal region.  She states she has experienced this problem for 1 month.  She describes the severity as mild. She states the symptoms are accentuated by standing, lifting, or straining.  She denies recent strenuous physical exertion that increases intra-abdominal pressure.  Patient notes additional aggravating factors include none and alleviating factors are none.  The patient reports additional symptoms as none.  The patient has not previously been evaluated for pelvic organ prolapse .    Do you feel something protruding from your vagina? yes  Do you have low back pain/pressure in pelvis? yes  Any other symptoms of vaginal dryness, itching, discomfort? Yes: Itching  Do you have frequent Urinary Tract Infections (UTI)? no  Do you have urgency of urination? yes  How many times at night do you get up to urinate? yes  Do you leak urine if you can't quite make it to the restroom in time? yes  DO you leak urine if you cough, sneeze, and/or lift something heavy? yes  Do you have trouble emptying bowels/defecating? no  Do you ever have to push inside your vagina to help stool come out? no  Are you sexually active? no  Do you desire the ability to have vaginal intercourse in the future? no  Have you ever been told your bladder, uterus, or rectum is falling? no  Any other symptoms you want to make you doctor aware of? No    Additional OB/GYN History   Last Pap :   Last Completed Pap Smear       This patient has no relevant Health Maintenance data.          History of abnormal Pap smear: no  Exercises Regularly: yes  Tobacco Usage?: No   OB History          14    Para   1    Term   1            AB   13  "   Living             SAB   11    IAB   2    Ectopic        Molar        Multiple        Live Births                    The additional following portions of the patient's history were reviewed and updated as appropriate: allergies, current medications, past family history, past medical history, past social history, past surgical history, and problem list.    Review of Systems   Genitourinary:  Positive for pelvic pressure and urinary incontinence.   All other systems reviewed and are negative.      I have reviewed and agree with the HPI, ROS, and historical information as entered above. Mary Guillaume MD      Objective   /70   Ht 152.4 cm (60\")   Wt 47.6 kg (105 lb)   BMI 20.51 kg/m²     Physical Exam  Physical Exam:  General:  well developed; well nourished  no acute distress   Abdomen: soft, non-tender; no masses  no umbilical or inguinal hernias are present  no hepato-splenomegaly   Pelvis: Clinical staff was present for exam  External genitalia:  normal appearance of the external genitalia including Bartholin's and [place]'s glands.  :  urethral meatus normal;  Vaginal:  atrophic mucosal changes are present;  Cervix:  normal appearance.  Cystocele GRADE 3  Rectocele GRADE 2  Cervix/Apical GRADE 3        Assessment & Plan     Assessment     Problem List Items Addressed This Visit       Thoracic aortic aneurysm without rupture    History of partial hysterectomy    Vaginal and cervical prolapse - Primary    Cystocele with rectocele         Plan     Medication(s) Ordered  Discussed both surgical and non-surgical options for treatment.  Pessary Fitting and Insertion. Pt fitted with #3 ring with support.  Return in about 4 weeks (around 6/3/2024) for Next scheduled follow up.        Mary Guillaume MD  05/06/2024  "

## 2024-05-07 ENCOUNTER — TELEPHONE (OUTPATIENT)
Dept: OBSTETRICS AND GYNECOLOGY | Facility: CLINIC | Age: 74
End: 2024-05-07
Payer: MEDICARE

## 2024-05-08 ENCOUNTER — TELEPHONE (OUTPATIENT)
Dept: ENDOCRINOLOGY | Facility: CLINIC | Age: 74
End: 2024-05-08
Payer: MEDICARE

## 2024-05-08 ENCOUNTER — TELEPHONE (OUTPATIENT)
Dept: OBSTETRICS AND GYNECOLOGY | Facility: CLINIC | Age: 74
End: 2024-05-08
Payer: MEDICARE

## 2024-05-08 NOTE — TELEPHONE ENCOUNTER
Pt called states she is getting patient assistance for brand name synthroid form that was filled on on 12/15/23 scanned in patient chart on 12/20/23 was for Synthroid 100 mcg. Pt states she has dose increase to synthroid 125 mcg. Prescription was sent to local pharmacy on 04/29/24 and not sent to patient assistance

## 2024-05-08 NOTE — TELEPHONE ENCOUNTER
Left message to return call.  Rx was originally sent to pharmacy.  Rx was reprinted and faxed to patient assistance.

## 2024-05-13 LAB — CREAT BLDA-MCNC: 0.6 MG/DL (ref 0.6–1.3)

## 2024-05-14 ENCOUNTER — OFFICE VISIT (OUTPATIENT)
Dept: OBSTETRICS AND GYNECOLOGY | Facility: CLINIC | Age: 74
End: 2024-05-14
Payer: MEDICARE

## 2024-05-14 VITALS — BODY MASS INDEX: 20.62 KG/M2 | HEIGHT: 60 IN | WEIGHT: 105 LBS

## 2024-05-14 DIAGNOSIS — N81.4 VAGINAL AND CERVICAL PROLAPSE: ICD-10-CM

## 2024-05-14 DIAGNOSIS — Z90.711 HISTORY OF PARTIAL HYSTERECTOMY: ICD-10-CM

## 2024-05-14 DIAGNOSIS — N81.6 CYSTOCELE WITH RECTOCELE: ICD-10-CM

## 2024-05-14 DIAGNOSIS — N81.10 CYSTOCELE WITH RECTOCELE: ICD-10-CM

## 2024-05-14 DIAGNOSIS — I71.21 ANEURYSM OF ASCENDING AORTA WITHOUT RUPTURE: Primary | ICD-10-CM

## 2024-05-14 NOTE — PROGRESS NOTES
"     Gynecologic Note      Subjective   Chief Complaint   Patient presents with    Follow-up     pessary     Fatmata Murdock is a 74 y.o. year old who presents to be seen for follow-up of her pessary.    Overall she is unsatisfied with how this pessary is doing.  She is aware that the pessary fell  She is able to empty were bladder without difficulty.  There is not significant urinary incontinence.  She does not have trouble with urinary urgency or frequency.  There is not significant vaginal discharge present.  She is using nothing to help decrease her vaginal discharge.  She is not having difficulty with bowel function.     OTHER THINGS SHE WANTS TO DISCUSS TODAY:  Nothing else      Past Surgical History:   Procedure Laterality Date    APPENDECTOMY      COLONOSCOPY      ESOPHAGUS SURGERY      HAND SURGERY Right 03/2020    HAND SURGERY Right     HYSTERECTOMY      OOPHORECTOMY      unilateral     OTHER SURGICAL HISTORY      SUBDURAL HEMATOMA REPAIR    SKIN CANCER EXCISION  08/01/2023    VAGOTOMY AND PYLOROPLASTY  1981       The following portions of the patient's history were reviewed and updated as appropriate:current medications and allergies      Review of Systems   Constitutional: Negative.    HENT: Negative.     Eyes: Negative.    Respiratory: Negative.     Cardiovascular: Negative.    Gastrointestinal: Negative.    Endocrine: Negative.    Genitourinary: Negative.    Musculoskeletal: Negative.    Skin: Negative.    Allergic/Immunologic: Negative.    Neurological: Negative.    Hematological: Negative.    Psychiatric/Behavioral: Negative.               Objective   Ht 152.4 cm (60\")   Wt 47.6 kg (105 lb)   BMI 20.51 kg/m²     Physical Exam      Physical Exam:  General:  well developed; well nourished  no acute distress   Abdomen: soft, non-tender; no masses  no umbilical or inguinal hernias are present   Pelvis: Clinical staff was present for exam  External genitalia:  normal appearance of the external genitalia " including Bartholin's and [place]'s glands.  :  urethral meatus normal;  Vaginal:  atrophic mucosal changes are present;  Cervix:  normal appearance.  Cystocele GRADE 3  Rectocele GRADE 3  Cervix/Apical GRADE 4           Problem List Items Addressed This Visit       Thoracic aortic aneurysm without rupture - Primary    History of partial hysterectomy    Vaginal and cervical prolapse    Cystocele with rectocele           Plan   We did try both a smaller ring and a gelhorn pessary, but they did not fit.  Her #3 ring pessary was removed, cleaned and replaced.  Symptomatic prolapse - fairly well controlled with current pessary but pt. With trouble replacing it b/c of prior hand surgery/arthritis.  At least 20min spent with patient explaining and teaching patient to use it.  In total, I spent at least 30min with patient.  The importance of keeping all planned follow-up and taking all medications as prescribed was emphasized.  Return for Next scheduled follow up.                Mary Guillaume MD   May 16, 2024

## 2024-05-16 ENCOUNTER — TELEPHONE (OUTPATIENT)
Dept: CARDIAC SURGERY | Facility: CLINIC | Age: 74
End: 2024-05-16
Payer: MEDICARE

## 2024-05-16 PROBLEM — N81.6 CYSTOCELE WITH RECTOCELE: Status: ACTIVE | Noted: 2024-05-16

## 2024-05-16 PROBLEM — N81.10 CYSTOCELE WITH RECTOCELE: Status: ACTIVE | Noted: 2024-05-16

## 2024-05-16 PROBLEM — N81.4 VAGINAL AND CERVICAL PROLAPSE: Status: ACTIVE | Noted: 2024-05-16

## 2024-05-16 NOTE — TELEPHONE ENCOUNTER
LM on nurse line for Dr. Nino Chung:  please address in next clinic note (scheduled 7/2/24): ID clearance for upcoming AVR/ thoracic aortic aneurysm repair as patient currently on long term antibiotics.    Jing LEWIS

## 2024-05-30 ENCOUNTER — OFFICE VISIT (OUTPATIENT)
Dept: NEUROSURGERY | Facility: CLINIC | Age: 74
End: 2024-05-30
Payer: MEDICARE

## 2024-05-30 VITALS — BODY MASS INDEX: 20.28 KG/M2 | WEIGHT: 103.3 LBS | TEMPERATURE: 97.8 F | HEIGHT: 60 IN

## 2024-05-30 DIAGNOSIS — D32.9 MENINGIOMA: Primary | ICD-10-CM

## 2024-05-30 NOTE — PROGRESS NOTES
"NEUROSURGERY PROGRESS NOTE    Patient: Fatmata Murdock  : 1950    Primary Care Provider: Ruth Caceres MD    Chief Complaint: Thoracic meningioma    Subjective: This is a 74-year-old female who I been following for an incidental thoracic meningioma.  She is here today for follow-up.  Clinically, she continues to do well.  She denies any new issues with leg weakness or balance problems.  She has no new back pain.    Objective    Vital Signs: Temperature 97.8 °F (36.6 °C), temperature source Infrared, height 152.4 cm (60\"), weight 46.9 kg (103 lb 4.8 oz), not currently breastfeeding.    Physical Exam  Awake, alert and oriented x 3  Opens eyes spont  Pupils 3 mm rx bilat  Extraocular muscles intact bilaterally  Face symmetric bilaterally  Tongue midline  5/5 in the lower extremities bilaterally.  Patellar reflexes are 1+ bilaterally    Current Medications:   Current Outpatient Medications:     azithromycin (ZITHROMAX) 500 MG tablet, , Disp: , Rfl:     buPROPion SR (WELLBUTRIN SR) 200 MG 12 hr tablet, Take 1 tablet by mouth. 300mg a day, Disp: , Rfl:     cholecalciferol (VITAMIN D3) 1.25 MG (17343 UT) capsule, Take 1 capsule by mouth Every 14 (Fourteen) Days., Disp: , Rfl:     Cyanocobalamin (VITAMIN B-12 IJ), , Disp: , Rfl:     estradiol (VIVELLE-DOT) 0.1 MG/24HR patch, Place 1 patch on the skin as directed by provider 2 (Two) Times a Week., Disp: 24 patch, Rfl: 3    FOLIC ACID PO, Take  by mouth., Disp: , Rfl:     levothyroxine (Synthroid) 125 MCG tablet, Take 1 tablet by mouth Daily., Disp: 90 tablet, Rfl: 3    rifAMPin (RIFADIN) 300 MG capsule, , Disp: , Rfl:     Unable to find, 1 each Every 30 (Thirty) Days. Med Name: Immunoglobulin infusion, Disp: , Rfl:      Laboratory Results:                              Brief Urine Lab Results       None          Microbiology Results (last 10 days)       ** No results found for the last 240 hours. **            Diagnostic Imaging: I reviewed and independently " interpreted the new imaging.     Assessment/Plan:  This is a 74-year-old female who I have been following for an incidental thoracic imaging.  She is here today for follow-up and continues to do well.  She has not had any new issues or complaints regarding the lesion, including worsening neck pain, leg weakness or balance problems.  Her new MRI shows stable size of the lesion.  As such, we will continue to monitor this conservatively.  We will plan to have the patient follow-up with me in 2 years with a new MRI.  I told the patient if she were develop worsening leg symptoms such as weakness or trouble walking prior to this appointment, she should give us a call so we can reevaluate her sooner.  The patient was in agreement with plan..    Diagnoses and all orders for this visit:    1. Meningioma (Primary)      Fatmata Murdock  reports that she has never smoked. She has never used smokeless tobacco.       Juan C Evans MD  05/30/24  14:25 EDT

## 2024-07-09 ENCOUNTER — TELEPHONE (OUTPATIENT)
Dept: CARDIOLOGY | Facility: HOSPITAL | Age: 74
End: 2024-07-09
Payer: MEDICARE

## 2024-07-09 NOTE — TELEPHONE ENCOUNTER
Returned call of patient. She stated she was supposed to have a surgery with Dr. Chirinos for a valve replacement and repair  to address her thoracic aortic aneurysm. She stated she never heard anything from Dr. Chirinos's office.     Called Dr. Chirinos's office and I spoke with Chintan. He stated patient was referred to their office on 3/12/2024 and had a consult on 4/16/2024, but it was cancelled because Dr. Chirinos had an emergency. Chintan stated he will call patient and arrange a new consult. Called patient and relayed the information. Verbalized understanding.

## 2024-07-10 ENCOUNTER — TELEPHONE (OUTPATIENT)
Dept: CARDIAC SURGERY | Facility: CLINIC | Age: 74
End: 2024-07-10
Payer: MEDICARE

## 2024-07-10 NOTE — TELEPHONE ENCOUNTER
I spoke with Mrs. Murdock regarding plans for surgery. Patient saw ID last week. Patient to follow-up with Dr. Nino Chung in 5 weeks, at that time they will discuss plans for timing of surgery.   I also spoke with JOSHUA Duff: patient will need a repeat CTA chest as well.     Patient is agreeable to plan.

## 2024-07-15 DIAGNOSIS — I35.1 NONRHEUMATIC AORTIC VALVE INSUFFICIENCY: ICD-10-CM

## 2024-07-15 DIAGNOSIS — I71.21 ANEURYSM OF ASCENDING AORTA WITHOUT RUPTURE: Primary | ICD-10-CM

## 2024-07-15 DIAGNOSIS — R06.02 SHORTNESS OF BREATH: ICD-10-CM

## 2024-07-23 ENCOUNTER — OFFICE VISIT (OUTPATIENT)
Dept: OBSTETRICS AND GYNECOLOGY | Facility: CLINIC | Age: 74
End: 2024-07-23
Payer: MEDICARE

## 2024-07-23 ENCOUNTER — LAB (OUTPATIENT)
Dept: LAB | Facility: HOSPITAL | Age: 74
End: 2024-07-23
Payer: MEDICARE

## 2024-07-23 VITALS
SYSTOLIC BLOOD PRESSURE: 110 MMHG | DIASTOLIC BLOOD PRESSURE: 72 MMHG | HEIGHT: 60 IN | BODY MASS INDEX: 19.63 KG/M2 | WEIGHT: 100 LBS

## 2024-07-23 DIAGNOSIS — N81.4 VAGINAL AND CERVICAL PROLAPSE: ICD-10-CM

## 2024-07-23 DIAGNOSIS — E03.9 ACQUIRED HYPOTHYROIDISM: ICD-10-CM

## 2024-07-23 DIAGNOSIS — N81.6 CYSTOCELE WITH RECTOCELE: Primary | ICD-10-CM

## 2024-07-23 DIAGNOSIS — Z79.890 HORMONE REPLACEMENT THERAPY (HRT): ICD-10-CM

## 2024-07-23 DIAGNOSIS — Z79.890 ENCOUNTER FOR MONITORING POSTMENOPAUSAL ESTROGEN REPLACEMENT THERAPY: ICD-10-CM

## 2024-07-23 DIAGNOSIS — Z51.81 ENCOUNTER FOR MONITORING POSTMENOPAUSAL ESTROGEN REPLACEMENT THERAPY: ICD-10-CM

## 2024-07-23 DIAGNOSIS — N81.10 CYSTOCELE WITH RECTOCELE: Primary | ICD-10-CM

## 2024-07-23 LAB — TSH SERPL DL<=0.05 MIU/L-ACNC: 0.08 UIU/ML (ref 0.27–4.2)

## 2024-07-23 PROCEDURE — 84443 ASSAY THYROID STIM HORMONE: CPT

## 2024-07-23 RX ORDER — ESTRADIOL 0.1 MG/D
1 FILM, EXTENDED RELEASE TRANSDERMAL 2 TIMES WEEKLY
Qty: 24 PATCH | Refills: 3 | Status: SHIPPED | OUTPATIENT
Start: 2024-07-25

## 2024-07-23 NOTE — PROGRESS NOTES
"     Gynecologic Note      Subjective   Chief Complaint   Patient presents with    Follow-up     Pessary check     Fatmata Murdock is a 74 y.o. year old who presents to be seen for follow-up of her pessary.    Overall she is unsure with how this pessary is doing.  She is aware of it being present. She states it hurts when its in place.  She is not removing the pessary herself.  She is able to empty were bladder without difficulty.  There is not significant urinary incontinence.  She does not have trouble with urinary urgency or frequency.  There is not significant vaginal discharge present.  She is using nothing to help decrease her vaginal discharge.  She is not having difficulty with bowel function.     Patient has been unable to use pessary.  She is currently using tampons to try and alleviate some symptoms.    OTHER THINGS SHE WANTS TO DISCUSS TODAY:  Nothing else      Past Surgical History:   Procedure Laterality Date    APPENDECTOMY      COLONOSCOPY      ESOPHAGUS SURGERY      HAND SURGERY Right 03/2020    HAND SURGERY Right     HYSTERECTOMY      OOPHORECTOMY      unilateral     OTHER SURGICAL HISTORY      SUBDURAL HEMATOMA REPAIR    SKIN CANCER EXCISION  08/01/2023    VAGOTOMY AND PYLOROPLASTY  1981       The following portions of the patient's history were reviewed and updated as appropriate:current medications and allergies      Review of Systems   All other systems reviewed and are negative.            Objective   /72   Ht 152.4 cm (60\")   Wt 45.4 kg (100 lb)   BMI 19.53 kg/m²     Physical Exam      Physical Exam:  General:  well developed; well nourished  no acute distress   Abdomen: soft, non-tender; no masses  no umbilical or inguinal hernias are present   Pelvis: Not performed.           Problem List Items Addressed This Visit       Encounter for monitoring postmenopausal estrogen replacement therapy    Relevant Medications    estradiol (VIVELLE-DOT) 0.1 MG/24HR patch    Vaginal and cervical " prolapse    Cystocele with rectocele - Primary    Relevant Orders    Ambulatory Referral to Physical Therapy     Other Visit Diagnoses       Hormone replacement therapy (HRT)        Relevant Medications    estradiol (VIVELLE-DOT) 0.1 MG/24HR patch                Plan   Reviewed her aneurysm surgery is far more important and is necessary prior to prolapse surgery.  Symptomatic prolapse - poorly controlled with current pessary.  She is using tampons  Pelvic floor PT referral  Return for after surgery by Dr. Chirinos.                Mary Guillaume MD   August 4, 2024

## 2024-08-19 ENCOUNTER — TELEPHONE (OUTPATIENT)
Dept: CARDIAC SURGERY | Facility: CLINIC | Age: 74
End: 2024-08-19
Payer: COMMERCIAL

## 2024-08-19 NOTE — TELEPHONE ENCOUNTER
Mrs. Murdock called stating that Dr. Chung has prescribed Clarithromycin 500mg bid. (She was previously on Azithromycin 500mg but this caused stomach pains.)     She has read that there is contraindication with taking Clarithromycin with certain heart conditions. She is wanting to check with you before starting this medication.

## 2024-08-20 ENCOUNTER — PATIENT MESSAGE (OUTPATIENT)
Dept: CARDIOLOGY | Facility: CLINIC | Age: 74
End: 2024-08-20
Payer: COMMERCIAL

## 2024-08-20 NOTE — TELEPHONE ENCOUNTER
I spoke with patient. She will be calling Dr. Velasco.    ID office note is now scanned into media

## 2024-08-30 DIAGNOSIS — I71.20 THORACIC AORTIC ANEURYSM WITHOUT RUPTURE, UNSPECIFIED PART: Primary | ICD-10-CM

## 2024-09-05 ENCOUNTER — TELEPHONE (OUTPATIENT)
Dept: CARDIAC SURGERY | Facility: CLINIC | Age: 74
End: 2024-09-05
Payer: COMMERCIAL

## 2024-09-05 NOTE — TELEPHONE ENCOUNTER
CANCELLED RECALL AND REFERRAL PT HAS CTA OF CHEST ON 12/11/2024, PER ABBEY I WAS ABLE TO MOVE PT APPT WITH CLARA TILL AFTER THE TESTING. CANCELING OUT THIS ORDER.

## 2024-09-06 ENCOUNTER — OFFICE VISIT (OUTPATIENT)
Age: 74
End: 2024-09-06
Payer: MEDICARE

## 2024-09-06 VITALS
HEART RATE: 77 BPM | DIASTOLIC BLOOD PRESSURE: 78 MMHG | SYSTOLIC BLOOD PRESSURE: 118 MMHG | WEIGHT: 99 LBS | HEIGHT: 60 IN | BODY MASS INDEX: 19.44 KG/M2 | OXYGEN SATURATION: 99 %

## 2024-09-06 DIAGNOSIS — E03.9 ACQUIRED HYPOTHYROIDISM: Primary | ICD-10-CM

## 2024-09-06 PROCEDURE — 99214 OFFICE O/P EST MOD 30 MIN: CPT | Performed by: INTERNAL MEDICINE

## 2024-09-06 PROCEDURE — 36415 COLL VENOUS BLD VENIPUNCTURE: CPT | Performed by: INTERNAL MEDICINE

## 2024-09-06 PROCEDURE — 84439 ASSAY OF FREE THYROXINE: CPT | Performed by: INTERNAL MEDICINE

## 2024-09-06 PROCEDURE — 84443 ASSAY THYROID STIM HORMONE: CPT | Performed by: INTERNAL MEDICINE

## 2024-09-06 RX ORDER — CLARITHROMYCIN 500 MG
500 TABLET ORAL DAILY
COMMUNITY
Start: 2024-08-15

## 2024-09-06 NOTE — PROGRESS NOTES
"     Office Note      Date: 2024  Patient Name: Fatmata Murdock  MRN: [id number]  : 1950    Chief Complaint   Patient presents with    Hypothyroidism       History of Present Illness:   Fatmata Murdock is a 74 y.o. female who presents for Hypothyroidism  .   Current rx: t4 - 125..  Tsh was high so we increased her dose. Then tsh was low but we elected to just sit tigh     Changes   she is in history: she is on abx  for AVM MAc  Questions /problems:none    Subjective          Review of Systems:   Review of Systems   Cardiovascular:  Positive for palpitations.   Endocrine: Negative for heat intolerance.   Neurological:  Negative for tremors.       The following portions of the patient's history were reviewed and updated as appropriate: allergies, current medications, past family history, past medical history, past social history, past surgical history, and problem list.    Objective     Visit Vitals  /78 (BP Location: Left arm, Patient Position: Sitting, Cuff Size: Adult)   Pulse 77   Ht 152.4 cm (60\")   Wt 44.9 kg (99 lb)   SpO2 99%   BMI 19.33 kg/m²           Physical Exam:  Physical Exam  Vitals reviewed.   Constitutional:       Appearance: Normal appearance.   Neck:      Comments: No goiter  Lymphadenopathy:      Cervical: No cervical adenopathy.   Skin:     General: Skin is warm.   Neurological:      Mental Status: She is alert.   Psychiatric:         Mood and Affect: Mood normal.         Behavior: Behavior normal.         Thought Content: Thought content normal.         Judgment: Judgment normal.         Assessment / Plan      Assessment & Plan:  Problem List Items Addressed This Visit       Acquired hypothyroidism - Primary    Current Assessment & Plan     Unstable  Tsh and has been unstable  The plan is to check and adjust         Relevant Medications    levothyroxine (Synthroid) 125 MCG tablet    Other Relevant Orders    TSH    T4, Free        Electronically signed by : Hudson Coe " MD Aleyda   09/06/2024

## 2024-09-07 LAB
T4 FREE SERPL-MCNC: 1.67 NG/DL (ref 0.92–1.68)
TSH SERPL DL<=0.05 MIU/L-ACNC: 0.04 UIU/ML (ref 0.27–4.2)

## 2024-09-09 RX ORDER — LEVOTHYROXINE SODIUM 112 UG/1
112 TABLET ORAL DAILY
Qty: 90 TABLET | Refills: 3 | Status: SHIPPED | OUTPATIENT
Start: 2024-09-09 | End: 2024-09-10

## 2024-09-09 NOTE — TELEPHONE ENCOUNTER
PATIENT STATES PROVIDER IS LOWERING HER SYNTHROID DOSAGE  MCG AND IS GETTING THIS PRESCRIPTION THROUGH PATIENT ASSISTANCE.     PLEASE FAX [phone]    IF NEED TO CONTACT PT, PLEASE CALL [phone]

## 2024-09-10 RX ORDER — LEVOTHYROXINE SODIUM 112 UG/1
112 TABLET ORAL DAILY
Qty: 90 TABLET | Refills: 1 | Status: SHIPPED | OUTPATIENT
Start: 2024-09-10 | End: 2025-09-10

## 2024-09-10 NOTE — TELEPHONE ENCOUNTER
Rx Refill Note  Requested Prescriptions     Pending Prescriptions Disp Refills    levothyroxine (SYNTHROID, LEVOTHROID) 112 MCG tablet 90 tablet 1     Sig: Take 1 tablet by mouth Daily.      Last office visit with prescribing clinician: 9/6/2024     Next office visit with prescribing clinician: 3/7/2025       Gilberto Nobles MA  09/10/24, 13:23 EDT

## 2024-09-16 ENCOUNTER — TELEPHONE (OUTPATIENT)
Dept: CARDIAC SURGERY | Facility: CLINIC | Age: 74
End: 2024-09-16
Payer: COMMERCIAL

## 2024-10-01 ENCOUNTER — TELEPHONE (OUTPATIENT)
Dept: OBSTETRICS AND GYNECOLOGY | Facility: CLINIC | Age: 74
End: 2024-10-01
Payer: COMMERCIAL

## 2024-10-01 ENCOUNTER — PATIENT MESSAGE (OUTPATIENT)
Dept: CARDIAC SURGERY | Facility: CLINIC | Age: 74
End: 2024-10-01
Payer: COMMERCIAL

## 2024-10-01 NOTE — TELEPHONE ENCOUNTER
Provider: Mary Guillaume MD     Caller: ROBI FELIX    Relationship to Patient: SELF        Phone Number: [phone]    Reason for Call: PT CALLED AND WOULD LIKE TO ASK WHAT RECOMMENDATION FROM THE PROVIDER WOULD BE REGARDING HAVING SURGERY FOR Vaginal and cervical prolapse

## 2024-10-28 ENCOUNTER — TELEPHONE (OUTPATIENT)
Dept: ENDOCRINOLOGY | Facility: CLINIC | Age: 74
End: 2024-10-28
Payer: MEDICARE

## 2024-10-28 ENCOUNTER — TELEPHONE (OUTPATIENT)
Dept: ENDOCRINOLOGY | Facility: CLINIC | Age: 74
End: 2024-10-28

## 2024-10-28 NOTE — TELEPHONE ENCOUNTER
"Patient states they received a call to let her know that her letter of medical necessity, said okay to mail this to her. If not possible please call her back.    Wants to make sure it says, \"No substitution\" wants to take as prescribed.   "

## 2024-10-28 NOTE — TELEPHONE ENCOUNTER
PT CALLED STATING SHE DROPPED OFF PT ASSISTANCE PAPERWORK ON MONDAY AT [facility]. SHE REQUESTED A CALL TO F/U.

## 2024-10-28 NOTE — TELEPHONE ENCOUNTER
LVM for Patient stating that her forms were filled out and ready for her to  from the [facility] office or if she would like us to mail them to her.

## 2024-10-29 ENCOUNTER — HOSPITAL ENCOUNTER (OUTPATIENT)
Dept: CARDIOLOGY | Facility: HOSPITAL | Age: 74
Discharge: HOME | End: 2024-10-29
Admitting: INTERNAL MEDICINE
Payer: MEDICARE

## 2024-10-29 VITALS
WEIGHT: 99 LBS | SYSTOLIC BLOOD PRESSURE: 135 MMHG | HEIGHT: 60 IN | BODY MASS INDEX: 19.44 KG/M2 | DIASTOLIC BLOOD PRESSURE: 67 MMHG

## 2024-10-29 DIAGNOSIS — I71.21 ANEURYSM OF ASCENDING AORTA WITHOUT RUPTURE: ICD-10-CM

## 2024-10-29 DIAGNOSIS — R06.02 SHORTNESS OF BREATH: ICD-10-CM

## 2024-10-29 DIAGNOSIS — I35.1 NONRHEUMATIC AORTIC VALVE INSUFFICIENCY: ICD-10-CM

## 2024-10-29 LAB
ASCENDING AORTA: 4.3 CM
BH CV ECHO MEAS - AI P1/2T: 283 MSEC
BH CV ECHO MEAS - AO MAX PG: 16 MMHG
BH CV ECHO MEAS - AO ROOT DIAM: 3.3 CM
BH CV ECHO MEAS - AO V2 MAX: 199.7 CM/SEC
BH CV ECHO MEAS - EF(MOD-BP): 56.6 %
BH CV ECHO MEAS - IVS/LVPW: 0.8 CM
BH CV ECHO MEAS - IVSD: 0.8 CM
BH CV ECHO MEAS - LA DIMENSION: 3.2 CM
BH CV ECHO MEAS - LAT PEAK E' VEL: 9.2 CM/SEC
BH CV ECHO MEAS - LV MAX PG: 10.1 MMHG
BH CV ECHO MEAS - LV MEAN PG: 4.2 MMHG
BH CV ECHO MEAS - LV V1 MAX: 158.6 CM/SEC
BH CV ECHO MEAS - LV V1 VTI: 31.1 CM
BH CV ECHO MEAS - LVIDD: 4.6 CM
BH CV ECHO MEAS - LVIDS: 2.7 CM
BH CV ECHO MEAS - LVOT DIAM: 2.1 CM
BH CV ECHO MEAS - LVPWD: 1 CM
BH CV ECHO MEAS - MED PEAK E' VEL: 7.1 CM/SEC
BH CV ECHO MEAS - MV MAX PG: 4.1 MMHG
BH CV ECHO MEAS - MV P1/2T: 90 MSEC
BH CV ECHO MEAS - PA ACC TIME: 0.2 SEC
BH CV ECHO MEAS - PA V2 MAX: 53.6 CM/SEC
BH CV ECHO MEAS - PI END-D VEL: 52.5 CM/SEC
BH CV ECHO MEAS - RAP SYSTOLE: 3 MMHG
BH CV ECHO MEAS - RVSP: 20 MMHG
BH CV ECHO MEAS - TAPSE (>1.6): 1.67 CM
BH CV ECHO MEAS - TR MAX PG: 17.1 MMHG
BH CV ECHO MEAS - TR MAX VEL: 206.3 CM/SEC
BH CV XLRA - RV BASE: 2.7 CM
BH CV XLRA - TDI S': 13.1 CM/SEC
LV EF 2D ECHO EST: 60 %

## 2024-10-29 PROCEDURE — 93306 TTE W/DOPPLER COMPLETE: CPT

## 2024-10-29 PROCEDURE — 93306 TTE W/DOPPLER COMPLETE: CPT | Performed by: INTERNAL MEDICINE

## 2024-10-31 ENCOUNTER — HOSPITAL ENCOUNTER (OUTPATIENT)
Dept: CT IMAGING | Facility: HOSPITAL | Age: 74
Discharge: HOME | End: 2024-10-31
Admitting: INTERNAL MEDICINE
Payer: MEDICARE

## 2024-10-31 PROCEDURE — 25510000001 IOPAMIDOL PER 1 ML: Performed by: INTERNAL MEDICINE

## 2024-10-31 PROCEDURE — 71275 CT ANGIOGRAPHY CHEST: CPT

## 2024-10-31 RX ORDER — IOPAMIDOL 755 MG/ML
100 INJECTION, SOLUTION INTRAVASCULAR
Status: COMPLETED | OUTPATIENT
Start: 2024-10-31 | End: 2024-10-31

## 2024-10-31 RX ADMIN — IOPAMIDOL 100 ML: 755 INJECTION, SOLUTION INTRAVENOUS at 14:41

## 2024-11-11 PROBLEM — R06.02 SHORTNESS OF BREATH: Status: RESOLVED | Noted: 2023-10-11 | Resolved: 2024-11-11

## 2024-11-11 NOTE — PROGRESS NOTES
[facility] Cardiothoracic Surgery Office Follow Up Note     Date of Encounter: 2024     Name: Fatmata Murdock  : 1950     Referred By: No ref. provider found  PCP: Provider, No Known    Chief Complaint:    Chief Complaint   Patient presents with    Aortic Aneurysm     6 month follow-up with CTA chest.        Subjective      History of Present Illness:    Fatmata Murdock is a 74 y.o. female non-smoker with history of hypogammaglobulinemia followed by hem Dr. Smith/Barry with monthly IgG infusions, thoracic intradural meningioma followed by NS , right subdural hematoma s/p bur hole 2019 at [facility], duodenal ulcer s/p vagotomy , mycobacterium avium of right wrist tenosynovitis s/p debridement with extended antibiotic coverage (zithromax and rifampin) per ID Dr. Chung, aortic regurgitation and TAA referred to Dr. Chirinos by 's cardiologist Dr Velasco. She has no family history of aneurysmal disease or personal history of hypertension.    At the time of initial consultation 2024, given her BSA of 1.4m2 and aortic size index of 3.2cm/m2 Dr Chirinos would consider surgical repair after release from ID. She presents today as 6 month follow-up with new imaging. She completed 9 months of coverage for her wrist but ultimately had to be taken of rifampin for elevated liver enzymes and intolerance with fatigue and nausea. Since discontinuing the antibiotics she is feeling slightly better but still has some GI complaints. She has had no recurrent right hand issues or fever, chills, body aches. She does endorse chest pressure at night with laying down after dinner. She previously has had no hypertension but was taken off her infusion for osteoporosis due to elevated readings.   She is anxious to return to running as she has been a runner for her whole life.     Review of Systems:  Review of Systems   Constitutional: Negative for chills, decreased appetite, diaphoresis, fever,  malaise/fatigue, night sweats, weight gain and weight loss.   HENT:  Negative for hoarse voice.    Eyes:  Negative for blurred vision, double vision and visual disturbance.   Cardiovascular:  Positive for dyspnea on exertion. Negative for chest pain, claudication, irregular heartbeat, leg swelling, near-syncope, orthopnea, palpitations, paroxysmal nocturnal dyspnea and syncope.        Pain down both arms   Cramping in feet and legs     Respiratory:  Positive for shortness of breath. Negative for cough, hemoptysis, sputum production and wheezing.    Hematologic/Lymphatic: Negative for adenopathy and bleeding problem. Does not bruise/bleed easily.   Skin:  Negative for color change, nail changes, poor wound healing and rash.   Musculoskeletal:  Positive for back pain and joint pain. Negative for falls and muscle cramps.   Gastrointestinal:  Positive for heartburn. Negative for abdominal pain and dysphagia.   Genitourinary:  Negative for flank pain.   Neurological:  Positive for dizziness, headaches (daily), light-headedness and numbness. Negative for brief paralysis, disturbances in coordination, focal weakness, loss of balance, paresthesias, sensory change, vertigo and weakness.   Psychiatric/Behavioral:  Positive for depression. Negative for suicidal ideas. The patient is nervous/anxious.    Allergic/Immunologic: Negative for persistent infections.       I have reviewed the following portions of the patient's history: problem list, current medications, allergies, past surgical history, past medical history, past social history, past family history, and ROS and confirm it's accurate.    Allergies:  Allergies   Allergen Reactions    Ceftriaxone Nausea And Vomiting, Hives and Other (See Comments)    Levofloxacin Swelling     Pruritus and vision change    Codeine Itching    Effexor Xr [Venlafaxine Hcl Er] Other (See Comments)     Restless legs, dermatitis     Hydrocodone Unknown (See Comments)    Penicillins GI  Intolerance    Sulfa Antibiotics GI Intolerance       Medications:      Current Outpatient Medications:     buPROPion SR (WELLBUTRIN SR) 200 MG 12 hr tablet, Take 1 tablet by mouth. 300mg a day, Disp: , Rfl:     cholecalciferol (VITAMIN D3) 1.25 MG (15725 UT) capsule, Take 1 capsule by mouth Every 14 (Fourteen) Days., Disp: , Rfl:     Cyanocobalamin (VITAMIN B-12 IJ), , Disp: , Rfl:     estradiol (VIVELLE-DOT) 0.1 MG/24HR patch, Place 1 patch on the skin as directed by provider 2 (Two) Times a Week., Disp: 24 patch, Rfl: 3    FOLIC ACID PO, Take  by mouth., Disp: , Rfl:     levothyroxine (SYNTHROID, LEVOTHROID) 112 MCG tablet, Take 1 tablet by mouth Daily., Disp: 90 tablet, Rfl: 1    Unable to find, 1 each Every 30 (Thirty) Days. Med Name: Immunoglobulin infusion, Disp: , Rfl:     History:   Past Medical History:   Diagnosis Date    Anemia May 1997    The anemia began due to ulcer.    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 Mtz / [facility]    Ovarian cyst     Pelvic pain     Routine gynecological examination     Skin cancer     right leg    Subdural hematoma     Thyroid condition     Vitamin B12 deficiency     Vitamin D deficiency        Past Surgical History:   Procedure Laterality Date    APPENDECTOMY      COLONOSCOPY      ESOPHAGUS SURGERY      HAND SURGERY Right 03/2020    HAND SURGERY Right     HYSTERECTOMY      OOPHORECTOMY      unilateral     OTHER SURGICAL HISTORY      SUBDURAL HEMATOMA REPAIR    SKIN CANCER EXCISION   08/01/2023    VAGOTOMY AND PYLOROPLASTY  1981       Social History     Socioeconomic History    Marital status: Single    Number of children: 1   Tobacco Use    Smoking status: Never    Smokeless tobacco: Never   Vaping Use    Vaping status: Never Used   Substance and Sexual Activity    Alcohol use: Never    Drug use: Never    Sexual activity: Not Currently     Partners: Male     Birth control/protection: Abstinence     Comment: Hysterectomy        Family History   Problem Relation Age of Onset    Depression Mother         Mom    Thyroid disease Mother     No Known Problems Father     Hypertension Sister         Taking medication    Arthritis Sister     Depression Sister     Anxiety disorder Sister     Hypertension Sister         Unknown    Thyroid disease Maternal Aunt     Anxiety disorder Daughter     Hypertension Sister     Breast cancer Neg Hx     Ovarian cancer Neg Hx     Uterine cancer Neg Hx     Colon cancer Neg Hx     Melanoma Neg Hx     Prostate cancer Neg Hx     Deep vein thrombosis Neg Hx     Pulmonary embolism Neg Hx     Coronary artery disease Neg Hx     Stroke Neg Hx     Osteoporosis Neg Hx     Diabetes Neg Hx        Objective   Physical Exam:  Vitals:    11/12/24 1353 11/12/24 1354   BP: 160/70 160/70   BP Location: Right arm Left arm   Patient Position: Sitting Sitting   Pulse: 65    Temp: 97.5 °F (36.4 °C)    SpO2: 98%    Weight: 45.9 kg (101 lb 3.2 oz)       Body mass index is 19.76 kg/m².    Physical Exam  Vitals and nursing note reviewed.   Constitutional:       Appearance: Normal appearance. She is well-developed.      Comments: Small stature   Neck:      Vascular: No carotid bruit.   Cardiovascular:      Rate and Rhythm: Normal rate and regular rhythm.      Pulses: Normal pulses.      Heart sounds: S1 normal and S2 normal. Murmur heard.   Pulmonary:      Effort: Pulmonary effort is normal.      Breath sounds: Normal breath sounds and air entry. No decreased breath sounds.   Abdominal:       Palpations: Abdomen is soft.      Comments: Linear scar   Musculoskeletal:         General: No swelling.      Right lower leg: No edema.      Left lower leg: No edema.   Skin:     General: Skin is warm and dry.      Capillary Refill: Capillary refill takes less than 2 seconds.      Findings: No bruising.   Neurological:      General: No focal deficit present.      Mental Status: She is alert and oriented to person, place, and time. Mental status is at baseline.      GCS: GCS eye subscore is 4. GCS verbal subscore is 5. GCS motor subscore is 6.      Motor: Motor function is intact.      Coordination: Coordination is intact.      Gait: Gait is intact.   Psychiatric:         Mood and Affect: Mood normal.         Speech: Speech normal.         Behavior: Behavior normal. Behavior is cooperative.         Cognition and Memory: Cognition normal.         Imaging/Labs:  CT Angiogram Chest (10/31/2024 14:40)   Stable borderline aneurysmal ectasia of the thoracic aorta including 4.0 cm greatest transverse dimension of the ascending thoracic aorta. The proximal descending thoracic aorta measures up to 3.1 cm. There is no evidence of dissection or other acute aortic pathology. There are no acute nonvascular findings.   Electronically Signed: Donavon Quesada MD     Adult Transthoracic Echo Complete W/ Cont if Necessary Per Protocol (10/29/2024 11:58)     Left ventricular systolic function is normal. Estimated left ventricular EF = 60%    Moderate aortic valve regurgitation is present.    Moderate dilation of the ascending aorta is present.    Aortic size index is 3.1 cm/m².    Compared to the October 2023 study the left ventricular size is similar, the aortic insufficiency remains moderate.  The aortic size index has increased from 2.8 to 3.1 cm/m².    CT Chest Without Contrast Diagnostic (02/13/2024 13:41)   1.  Lungs are essentially clear with no focal airspace disease or  centrilobular nodules as would be expected with atypical  "infectious etiology.  2.  No hilar or mediastinal lymphadenopathy.  3.  4.59 cm transverse diameter ascending thoracic aorta constitutes  aneurysmal dilatation.  4.  Calcified scar/fibrosis right upper lobe is stable and is along the  right minor fissure.  5.  Nonobstructing left kidney stone. No hydronephrosis.  6.  Mild cardiomegaly. Other nonacute findings as above.   This report was finalized on 2/13/2024 2:10 PM by Dr. Carter Cobos MD.    CT Chest With Contrast Diagnostic (03/15/2022 11:23)   1. Ectasia of the ascending aorta measuring 4 cm at the level of the  main pulmonary artery. Complete assessment of the aortic root and  ascending aorta partially limited due to cardiac motion.  2. No acute intrathoracic process.  3. Right paracentral calcified disc extrusion versus meningioma at T4-5  level, correlate for any associated clinical symptoms. If further  concern clinically, this could be further assessed with thoracic spine MRI.   This report was finalized on 3/15/2022 1:28 PM by Bear Andrade MD.    Assessment / Plan      Assessment / Plan:  Diagnoses and all orders for this visit:    1. Aneurysm of ascending aorta without rupture (Primary)    2. Nonrheumatic aortic valve insufficiency       Initial referral to Dr. Chirinos by pt's cardiologist Dr Velasco 4/2024  no family history of aneurysmal disease   No AAA screening - will obtain  2/2024 CT with 4.5cm ascending TAA and 10/2023 ECHO with moderate regurgitation  At the time of initial consultation, given her BSA of 1.4m2 and aortic size index of 3.2cm/m2 Dr Chirinos would consider surgical repair after release from ID.   6 month follow-up - has been off her antibiotics with no signs of reactivation and plans to monitor off antibiotics with 6-8 week follow-up. Per ID Dr Chung's last office note dated 9/23/2024 \"okay to proceed with aortic valve repair\"  10/2024 CT chest personally reviewed with stable 4.5cm fusiform ascending TAA   10/2024 TTE with " ongoing moderate regurgitation - AI 333msec  Discussed proceeding with thoracic aneurysm repair +/- aortic valve replacement   Risks, benefits, and alternatives were presented to the patient and through shared decision making, the patient elects to proceed with procedure.   Risks of surgery were discussed with the patient including: pain, bleeding, infection, blood clots, kidney damage, stroke, heart attack, or death.  Patient understands risks and agrees to proceed.  Dr Chirinos unavailable for office visit or input at this time. Will require case review prior to scheduling     Follow Up: pending   No follow-ups on file.   Or sooner for any further concerns or worsening sign and symptoms. If unable to reach us in the office please dial 911 or go to the nearest emergency department.      JOSHUA Hall  [facility] Cardiothoracic Surgery      Time Spent: I spent 64 minutes caring for Fatmata on this date of service. This time includes time spent by me in the following activities: preparing for the visit, reviewing tests, obtaining and/or reviewing a separately obtained history, performing a medically appropriate examination and/or evaluation, counseling and educating the patient/family/caregiver, ordering medications, tests, or procedures, referring and communicating with other health care professionals, documenting information in the medical record, independently interpreting results and communicating that information with the patient/family/caregiver, and care coordination.

## 2024-11-12 ENCOUNTER — OFFICE VISIT (OUTPATIENT)
Dept: CARDIAC SURGERY | Facility: CLINIC | Age: 74
End: 2024-11-12
Payer: MEDICARE

## 2024-11-12 VITALS
HEART RATE: 65 BPM | BODY MASS INDEX: 19.76 KG/M2 | DIASTOLIC BLOOD PRESSURE: 70 MMHG | WEIGHT: 101.2 LBS | OXYGEN SATURATION: 98 % | SYSTOLIC BLOOD PRESSURE: 160 MMHG | TEMPERATURE: 97.5 F

## 2024-11-12 DIAGNOSIS — I35.1 NONRHEUMATIC AORTIC VALVE INSUFFICIENCY: ICD-10-CM

## 2024-11-12 DIAGNOSIS — I71.21 ANEURYSM OF ASCENDING AORTA WITHOUT RUPTURE: Primary | ICD-10-CM

## 2024-11-12 PROCEDURE — 99215 OFFICE O/P EST HI 40 MIN: CPT | Performed by: NURSE PRACTITIONER

## 2024-11-13 ENCOUNTER — TELEPHONE (OUTPATIENT)
Dept: GASTROENTEROLOGY | Facility: CLINIC | Age: 74
End: 2024-11-13
Payer: MEDICARE

## 2024-11-13 DIAGNOSIS — I71.21 ANEURYSM OF ASCENDING AORTA WITHOUT RUPTURE: Primary | ICD-10-CM

## 2024-11-13 NOTE — TELEPHONE ENCOUNTER
PATIENT IS HAVING STOMACH PAIN. TAKING LOTS OF TUMS AND THAT HELPS SHORT TERM BUT PAIN COMES BACK. ALSO TRIED PROBIOTICS BUT THAT DOESN'T HELP WITH THE PAIN.     PAIN IS UNDER  RIGHT RIB CAGE, LOTS OF GAS AND BELCHING.     WOULD LIKE TO SPEAK TO SOMEONE CLINICAL    BEST AFTER 10AM  [phone]

## 2024-11-22 ENCOUNTER — HOSPITAL ENCOUNTER (OUTPATIENT)
Dept: ULTRASOUND IMAGING | Facility: HOSPITAL | Age: 74
Discharge: HOME | End: 2024-11-22
Payer: MEDICARE

## 2024-11-22 DIAGNOSIS — I71.21 ANEURYSM OF ASCENDING AORTA WITHOUT RUPTURE: ICD-10-CM

## 2024-11-22 PROCEDURE — 76706 US ABDL AORTA SCREEN AAA: CPT | Performed by: RADIOLOGY

## 2024-11-22 PROCEDURE — 76706 US ABDL AORTA SCREEN AAA: CPT

## 2024-11-25 ENCOUNTER — TELEPHONE (OUTPATIENT)
Dept: CARDIAC SURGERY | Facility: CLINIC | Age: 74
End: 2024-11-25
Payer: MEDICARE

## 2024-11-25 NOTE — TELEPHONE ENCOUNTER
Please see addendum on clinic note 11/12/2024 Birdie LEWIS- Dr. Chirinos does not recommend surgery at this time and recommends pt to return for annual surveillance.  I did discuss this with patient.  She will reach out if she has any changes or further questions

## 2024-12-05 ENCOUNTER — TELEPHONE (OUTPATIENT)
Dept: OBSTETRICS AND GYNECOLOGY | Facility: CLINIC | Age: 74
End: 2024-12-05
Payer: MEDICARE

## 2024-12-05 NOTE — TELEPHONE ENCOUNTER
Provider:  DR RECIO    Caller: ROBI FELIX     Phone Number:[phone]     Reason for Call: PATIENT WAS CALLING TO SEE WHAT THE NEXT STEPS SHOULD BE FOR HER BLADDER PROLAPSE//HER CARDIOLOGY AND INFECTION CONTROL DR HAVE RELEASED HER AND DONT NEED A FOLLOW UP FOR 1 YEAR//PLEASE FOLLOW UP

## 2024-12-05 NOTE — TELEPHONE ENCOUNTER
Patient of Dr. Guillaume; LOV 05/14/24.  Returned patient's call.   States she wants to discuss having a procedure for her bladder prolapse.   Appointment scheduled with Dr. Guillaume.

## 2024-12-09 NOTE — PROGRESS NOTES
[facility] Cardiology    Patient ID: Fatmata Murdock is a 74 y.o. female.  : 1950   Contact: [phone]    Encounter date: 2024    PCP: Miesha Lobato APRN      Chief complaint:   Chief Complaint   Patient presents with    Nonrheumatic aortic valve insufficiency       Problem List:  Aortic aneurysm  CT scan for coronary artery calcification 2020 revealed an enlargement of the ascending aorta up to 4.1 cm.  The coronary artery calcium score was 4.  Echocardiogram, 2021: EF 60%. Moderate dilation on ascending aorta. Aortic size index 2.64. Moderate AR.   Echocardiogram, 2022: EF 60%. Aortic root dilation 3.8 cm. Moderate AR.  Aortic size index 2.8.  CT scan of the chest with contrast 3/15/2022 revealed a 4 cm a sending aorta at the level of the main pulmonary artery.  (Also noted is a 12 x 9 x 5 mm calcified right paracentral structure at the T4-5 level which could be a calcified chronic disc excreted extrusion or meningioma.)  CT chest, 2024; Lungs are essentially clear with no focal airspace disease or centrilobular nodules as would be expected with atypical infectious etiology. No hilar or mediastinal lymphadenopathy. 4.59 cm transverse diameter ascending thoracic aorta constitutes aneurysmal dilatation. Calcified scar/fibrosis right upper lobe is stable and is along the right minor fissure. Non-obstructing left kidney stone. No hydronephrosis. Mild cardiomegaly. Other nonacute findings as above.  CTA chest, 10/31/2024; Stable borderline aneurysmal ectasia of the thoracic aorta including 4.0 cm greatest transverse dimension of the ascending thoracic aorta. The proximal descending thoracic aorta measures up to 3.1 cm. There is no evidence of dissection or other acute aortic pathology. There are no acute nonvascular findings.  US AAA, 2024; The proximal aorta measures 1.4 cm. The mid aorta measures 1.5 cm. The distal aorta measures 2.3 cm. No  evidence of an abdominal aneurysm.   Aortic regurgitation   Echocardiogram, 2/22/2022: EF 60%. Aortic root dilation 3.8 cm. Moderate AR.  Aortic size index 2.8.  Echocardiogram 4/19/2023: EF 55%, moderate AI, mild MR and TR, mild dilation of the ascending aorta is present.  Aortic size index 2.9 cm/m²  Echo, 10/11/2023: EF 55%. Moderate AR. Mild MR/TR. RVSP is 19 mmHg. The aortic size index is 2.8 cm/m².   Echocardiogram, 10/29/2024; EF 60%. Moderate AR. Moderate dilation of the ascending aorta is present. Aortic size index is 3.1 cm/m2. Compared to the October 2023 study the left ventricular size is similar, the aortic insufficiency remains moderate. The aortic size index has increased from 2.8 to 3.1 cm/m².   Family history of CAD  Osteoporosis  Dr. Guo  Thoracic intradural tumor  Followed by Dr. Evans  CT scan of the chest with contrast 3/15/2022 revealed a 4 cm a sending aorta at the level of the main pulmonary artery.  (Also noted is a 12 x 9 x 5 mm calcified right paracentral structure at the T4-5 level which could be a calcified chronic disc excreted extrusion or meningioma.  MRI thoracic spine, 12/08/2022; 14 x 10 x 10 mm extradural enhancing intradural extra medullary finding at the level of T4, favoring meningioma, versus possible schwannoma given mild adjacent involvement of the right neural foramen. The adjacent thoracic spinal cord is effaced and displaced posteriorly, otherwise without focal signal abnormality concerning for cord edema.   Hypothyroidism  Right subdural hematoma, s/p two shayy hole washout, 7/30/19, [facility]  Hypogammaglobulinemia  Followed by Dr. Guan  IgG infusions monthly  S/p vagotomy for duodenal ulcer    Allergies   Allergen Reactions    Ceftriaxone Nausea And Vomiting, Hives and Other (See Comments)    Levofloxacin Swelling     Pruritus and vision change    Codeine Itching    Effexor Xr [Venlafaxine Hcl Er] Other (See Comments)     Restless legs, dermatitis     Hydrocodone  Unknown (See Comments)    Penicillins GI Intolerance    Sulfa Antibiotics GI Intolerance       Current Medications:    Current Outpatient Medications:     buPROPion SR (WELLBUTRIN SR) 200 MG 12 hr tablet, Take 1 tablet by mouth. 300mg a day, Disp: , Rfl:     cholecalciferol (VITAMIN D3) 1.25 MG (58950 UT) capsule, Take 1 capsule by mouth Every 14 (Fourteen) Days., Disp: , Rfl:     Cyanocobalamin (VITAMIN B-12 IJ), , Disp: , Rfl:     estradiol (VIVELLE-DOT) 0.1 MG/24HR patch, Place 1 patch on the skin as directed by provider 2 (Two) Times a Week., Disp: 24 patch, Rfl: 3    FOLIC ACID PO, Take  by mouth., Disp: , Rfl:     levothyroxine (SYNTHROID, LEVOTHROID) 112 MCG tablet, Take 1 tablet by mouth Daily., Disp: 90 tablet, Rfl: 1    Unable to find, 1 each Every 30 (Thirty) Days. Med Name: Immunoglobulin infusion, Disp: , Rfl:     HPI    Fatmata Murdock is a 74 y.o. female who presents today for a follow up of aortic aneurysm, aortic valve insufficiency, and cardiac risk factors. Since last visit, Kae is having worsening dyspnea with exertional activity.  She has also had an episode where she had pain radiating down both arms.  This is not a usual occurrence for her.  Her blood pressure typically is in the 120s at home and in the past has been too low for beta-blockers.  She also has not wanted to take a beta-blocker due to the side effects of fatigue.  She is willing to try nebivolol.  Her right wrist appears to have healed.  She is supposed to see the surgeon in the next couple of weeks.  She is interested in a second opinion regarding her aortic valve.      The following portions of the patient's history were reviewed and updated as appropriate: allergies, current medications and problem list.    Pertinent positives as listed in the HPI.  All other systems reviewed are negative.         Vitals:    12/11/24 1432   BP: 150/70   BP Location: Left arm   Patient Position: Sitting   Pulse: 72   SpO2: 99%   Weight: 47.2  "kg (104 lb)   Height: 152.4 cm (60\")       Physical Exam:  General: Alert and oriented.  Neck: Jugular venous pressure is within normal limits. Carotids have normal upstrokes without bruits.   Cardiovascular: Heart has a nondisplaced focal PMI. Regular rate and rhythm. 3/6 diastolic murmur, no gallop or rub.  Lungs: Clear, no rales or wheezes. Equal expansion is noted.   Extremities: Show no edema.  Skin: Warm and dry.  Neurologic: Nonfocal.     Diagnostic Data (reviewed with patient):  Lab Results   Component Value Date    GLUCOSE 69 03/05/2024    BUN 14 03/05/2024    CREATININE 0.60 05/06/2024    EGFR 95.9 03/05/2024    BCR 23.7 03/05/2024     03/05/2024    K 4.3 03/05/2024     03/05/2024    CO2 26.0 03/05/2024    CALCIUM 8.6 03/05/2024    ALBUMIN 3.8 03/05/2024    ALKPHOS 57 03/05/2024    AST 23 03/05/2024    ALT 20 03/05/2024     Lab Results   Component Value Date    WBC 4.41 03/05/2024    RBC 4.30 03/05/2024    HGB 13.5 03/05/2024    HCT 41.1 03/05/2024    MCV 95.6 03/05/2024     03/05/2024      Lab Results   Component Value Date    TSH 0.038 (L) 09/06/2024        Advance Care Planning   ACP discussion was held with the patient during this visit. Patient does not have an advance directive, declines further assistance.       Procedures      Assessment:    ICD-10-CM ICD-9-CM   1. Nonrheumatic aortic valve insufficiency  I35.1 424.1   2. Thoracic aortic aneurysm without rupture, unspecified part  I71.20 441.2         Plan:  Referral to Dr. Segura for second opinion regarding her acing aortic aneurysm and aortic insufficiency  Begin nebivolol 2.5 mg daily.  Goal for blood pressure is 100 to 120 mmHg systolic.  Continue all other current medications.  F/up in 4 months, sooner if needed.      Blanche Velasco MD, FACC              "

## 2024-12-11 ENCOUNTER — OFFICE VISIT (OUTPATIENT)
Dept: CARDIOLOGY | Facility: CLINIC | Age: 74
End: 2024-12-11
Payer: MEDICARE

## 2024-12-11 VITALS
SYSTOLIC BLOOD PRESSURE: 150 MMHG | HEIGHT: 60 IN | OXYGEN SATURATION: 99 % | WEIGHT: 104 LBS | DIASTOLIC BLOOD PRESSURE: 70 MMHG | HEART RATE: 72 BPM | BODY MASS INDEX: 20.42 KG/M2

## 2024-12-11 DIAGNOSIS — I35.1 NONRHEUMATIC AORTIC VALVE INSUFFICIENCY: Primary | ICD-10-CM

## 2024-12-11 DIAGNOSIS — I71.20 THORACIC AORTIC ANEURYSM WITHOUT RUPTURE, UNSPECIFIED PART: ICD-10-CM

## 2024-12-11 PROCEDURE — 99214 OFFICE O/P EST MOD 30 MIN: CPT | Performed by: INTERNAL MEDICINE

## 2024-12-11 PROCEDURE — 1159F MED LIST DOCD IN RCRD: CPT | Performed by: INTERNAL MEDICINE

## 2024-12-11 PROCEDURE — 1160F RVW MEDS BY RX/DR IN RCRD: CPT | Performed by: INTERNAL MEDICINE

## 2024-12-11 RX ORDER — NEBIVOLOL 2.5 MG/1
2.5 TABLET ORAL DAILY
Qty: 90 TABLET | Refills: 3 | Status: SHIPPED | OUTPATIENT
Start: 2024-12-11

## 2024-12-16 ENCOUNTER — PATIENT MESSAGE (OUTPATIENT)
Dept: CARDIOLOGY | Facility: CLINIC | Age: 74
End: 2024-12-16
Payer: MEDICARE

## 2024-12-31 ENCOUNTER — TELEPHONE (OUTPATIENT)
Dept: CARDIOLOGY | Facility: CLINIC | Age: 74
End: 2024-12-31
Payer: MEDICARE

## 2024-12-31 NOTE — TELEPHONE ENCOUNTER
PT LEFT VOICEMAIL ASKING ABOUT CANCELLATIONS ON DR. DUNLAP WAIT LIST, LEFT VOICEMAIL FOR HER, ADVISED AT THIS TIME SHE DOES NOT HAVE A SOONER APPT BUT SHE IS STILL ON THE WAIT LIST AND WILL BE CALLED IF SOMEONE CANCELS.

## 2025-01-02 DIAGNOSIS — I71.21 ANEURYSM OF ASCENDING AORTA WITHOUT RUPTURE: Primary | ICD-10-CM

## 2025-01-02 DIAGNOSIS — R93.1 ABNORMAL FINDINGS ON DIAGNOSTIC IMAGING OF HEART AND CORONARY CIRCULATION: ICD-10-CM

## 2025-01-08 ENCOUNTER — TELEPHONE (OUTPATIENT)
Dept: CARDIOLOGY | Facility: CLINIC | Age: 75
End: 2025-01-08
Payer: MEDICARE

## 2025-01-08 NOTE — TELEPHONE ENCOUNTER
CALLED TO MOVE HER AUGUST 2025 APPT TO APRIL 2025. LVM ADVISING ABLE TO MOVE APPT UP TO CORRECT TIME FRAME AND CANCELED THE AUGUST APPT, LEFT MY PHONE NUMBER IF ANY QUESTIONS

## 2025-01-08 NOTE — TELEPHONE ENCOUNTER
Patient left a voice mail message.  She would like to know if she needs to continue the nebivolol prior to her procedure on 1/13/25.  Discussed with [facility] - patient to continue medication.  LM with patient as above.  She is encouraged to call with any questions or concerns.

## 2025-01-13 ENCOUNTER — HOSPITAL ENCOUNTER (OUTPATIENT)
Facility: HOSPITAL | Age: 75
Setting detail: HOSPITAL OUTPATIENT SURGERY
Discharge: HOME | End: 2025-01-13
Attending: INTERNAL MEDICINE | Admitting: INTERNAL MEDICINE
Payer: MEDICARE

## 2025-01-13 VITALS
TEMPERATURE: 97.4 F | BODY MASS INDEX: 20.77 KG/M2 | DIASTOLIC BLOOD PRESSURE: 71 MMHG | SYSTOLIC BLOOD PRESSURE: 129 MMHG | HEART RATE: 60 BPM | RESPIRATION RATE: 16 BRPM | HEIGHT: 60 IN | OXYGEN SATURATION: 96 % | WEIGHT: 105.8 LBS

## 2025-01-13 DIAGNOSIS — I71.21 ANEURYSM OF ASCENDING AORTA WITHOUT RUPTURE: ICD-10-CM

## 2025-01-13 DIAGNOSIS — R93.1 ABNORMAL FINDINGS ON DIAGNOSTIC IMAGING OF HEART AND CORONARY CIRCULATION: ICD-10-CM

## 2025-01-13 LAB
ANION GAP SERPL CALCULATED.3IONS-SCNC: 8 MMOL/L (ref 5–15)
BUN SERPL-MCNC: 21 MG/DL (ref 8–23)
BUN/CREAT SERPL: 37.5 (ref 7–25)
CALCIUM SPEC-SCNC: 9.8 MG/DL (ref 8.6–10.5)
CHLORIDE SERPL-SCNC: 101 MMOL/L (ref 98–107)
CHOLEST SERPL-MCNC: 166 MG/DL (ref 0–200)
CO2 SERPL-SCNC: 28 MMOL/L (ref 22–29)
CREAT SERPL-MCNC: 0.56 MG/DL (ref 0.57–1)
DEPRECATED RDW RBC AUTO: 45.2 FL (ref 37–54)
EGFRCR SERPLBLD CKD-EPI 2021: 96.5 ML/MIN/1.73
ERYTHROCYTE [DISTWIDTH] IN BLOOD BY AUTOMATED COUNT: 12.6 % (ref 12.3–15.4)
GLUCOSE SERPL-MCNC: 91 MG/DL (ref 65–99)
HBA1C MFR BLD: 4.8 % (ref 4.8–5.6)
HCT VFR BLD AUTO: 42.8 % (ref 34–46.6)
HDLC SERPL-MCNC: 85 MG/DL (ref 40–60)
HGB BLD-MCNC: 14.1 G/DL (ref 12–15.9)
LDLC SERPL CALC-MCNC: 69 MG/DL (ref 0–100)
LDLC/HDLC SERPL: 0.81 {RATIO}
MCH RBC QN AUTO: 32 PG (ref 26.6–33)
MCHC RBC AUTO-ENTMCNC: 32.9 G/DL (ref 31.5–35.7)
MCV RBC AUTO: 97.3 FL (ref 79–97)
PLATELET # BLD AUTO: 196 10*3/MM3 (ref 140–450)
PMV BLD AUTO: 11.7 FL (ref 6–12)
POTASSIUM SERPL-SCNC: 4.7 MMOL/L (ref 3.5–5.2)
RBC # BLD AUTO: 4.4 10*6/MM3 (ref 3.77–5.28)
SODIUM SERPL-SCNC: 137 MMOL/L (ref 136–145)
TRIGL SERPL-MCNC: 60 MG/DL (ref 0–150)
VLDLC SERPL-MCNC: 12 MG/DL (ref 5–40)
WBC NRBC COR # BLD AUTO: 8.72 10*3/MM3 (ref 3.4–10.8)

## 2025-01-13 PROCEDURE — C1769 GUIDE WIRE: HCPCS | Performed by: INTERNAL MEDICINE

## 2025-01-13 PROCEDURE — 25810000003 SODIUM CHLORIDE 0.9 % SOLUTION: Performed by: PHYSICIAN ASSISTANT

## 2025-01-13 PROCEDURE — 25010000002 ONDANSETRON PER 1 MG: Performed by: INTERNAL MEDICINE

## 2025-01-13 PROCEDURE — 93454 CORONARY ARTERY ANGIO S&I: CPT | Performed by: INTERNAL MEDICINE

## 2025-01-13 PROCEDURE — 25010000002 MORPHINE PER 10 MG: Performed by: INTERNAL MEDICINE

## 2025-01-13 PROCEDURE — C1894 INTRO/SHEATH, NON-LASER: HCPCS | Performed by: INTERNAL MEDICINE

## 2025-01-13 PROCEDURE — 85027 COMPLETE CBC AUTOMATED: CPT | Performed by: INTERNAL MEDICINE

## 2025-01-13 PROCEDURE — 80048 BASIC METABOLIC PNL TOTAL CA: CPT | Performed by: INTERNAL MEDICINE

## 2025-01-13 PROCEDURE — 80061 LIPID PANEL: CPT | Performed by: INTERNAL MEDICINE

## 2025-01-13 PROCEDURE — 83036 HEMOGLOBIN GLYCOSYLATED A1C: CPT | Performed by: INTERNAL MEDICINE

## 2025-01-13 PROCEDURE — 25810000003 SODIUM CHLORIDE 0.9 % SOLUTION: Performed by: INTERNAL MEDICINE

## 2025-01-13 PROCEDURE — 25010000002 MIDAZOLAM PER 1 MG: Performed by: INTERNAL MEDICINE

## 2025-01-13 PROCEDURE — 25010000002 LIDOCAINE PF 1% 1 % SOLUTION: Performed by: INTERNAL MEDICINE

## 2025-01-13 PROCEDURE — 25510000001 IOPAMIDOL PER 1 ML: Performed by: INTERNAL MEDICINE

## 2025-01-13 RX ORDER — MORPHINE SULFATE 2 MG/ML
INJECTION, SOLUTION INTRAMUSCULAR; INTRAVENOUS
Status: DISCONTINUED | OUTPATIENT
Start: 2025-01-13 | End: 2025-01-13 | Stop reason: HOSPADM

## 2025-01-13 RX ORDER — ALPRAZOLAM 0.25 MG/1
0.25 TABLET ORAL 3 TIMES DAILY PRN
Status: DISCONTINUED | OUTPATIENT
Start: 2025-01-13 | End: 2025-01-13 | Stop reason: HOSPADM

## 2025-01-13 RX ORDER — MIDAZOLAM HYDROCHLORIDE 1 MG/ML
INJECTION, SOLUTION INTRAMUSCULAR; INTRAVENOUS
Status: DISCONTINUED | OUTPATIENT
Start: 2025-01-13 | End: 2025-01-13 | Stop reason: HOSPADM

## 2025-01-13 RX ORDER — MORPHINE SULFATE 2 MG/ML
1 INJECTION, SOLUTION INTRAMUSCULAR; INTRAVENOUS EVERY 4 HOURS PRN
Status: DISCONTINUED | OUTPATIENT
Start: 2025-01-13 | End: 2025-01-13 | Stop reason: HOSPADM

## 2025-01-13 RX ORDER — SODIUM CHLORIDE 9 MG/ML
100 INJECTION, SOLUTION INTRAVENOUS CONTINUOUS
Status: ACTIVE | OUTPATIENT
Start: 2025-01-13 | End: 2025-01-13

## 2025-01-13 RX ORDER — ONDANSETRON 2 MG/ML
INJECTION INTRAMUSCULAR; INTRAVENOUS
Status: DISCONTINUED | OUTPATIENT
Start: 2025-01-13 | End: 2025-01-13 | Stop reason: HOSPADM

## 2025-01-13 RX ORDER — IOPAMIDOL 755 MG/ML
INJECTION, SOLUTION INTRAVASCULAR
Status: DISCONTINUED | OUTPATIENT
Start: 2025-01-13 | End: 2025-01-13 | Stop reason: HOSPADM

## 2025-01-13 RX ORDER — NALOXONE HCL 0.4 MG/ML
0.4 VIAL (ML) INJECTION
Status: DISCONTINUED | OUTPATIENT
Start: 2025-01-13 | End: 2025-01-13 | Stop reason: HOSPADM

## 2025-01-13 RX ORDER — LIDOCAINE HYDROCHLORIDE 10 MG/ML
INJECTION, SOLUTION EPIDURAL; INFILTRATION; INTRACAUDAL; PERINEURAL
Status: DISCONTINUED | OUTPATIENT
Start: 2025-01-13 | End: 2025-01-13 | Stop reason: HOSPADM

## 2025-01-13 RX ORDER — NITROGLYCERIN 0.4 MG/1
0.4 TABLET SUBLINGUAL
Status: DISCONTINUED | OUTPATIENT
Start: 2025-01-13 | End: 2025-01-13 | Stop reason: HOSPADM

## 2025-01-13 RX ORDER — ASPIRIN 81 MG/1
324 TABLET, CHEWABLE ORAL ONCE
Status: DISCONTINUED | OUTPATIENT
Start: 2025-01-13 | End: 2025-01-13 | Stop reason: HOSPADM

## 2025-01-13 RX ORDER — ASPIRIN 81 MG/1
81 TABLET ORAL DAILY
Status: DISCONTINUED | OUTPATIENT
Start: 2025-01-14 | End: 2025-01-13 | Stop reason: HOSPADM

## 2025-01-13 RX ORDER — HYDROCODONE BITARTRATE AND ACETAMINOPHEN 7.5; 325 MG/1; MG/1
1 TABLET ORAL EVERY 4 HOURS PRN
Status: DISCONTINUED | OUTPATIENT
Start: 2025-01-13 | End: 2025-01-13 | Stop reason: HOSPADM

## 2025-01-13 RX ORDER — ACETAMINOPHEN 325 MG/1
650 TABLET ORAL EVERY 4 HOURS PRN
Status: DISCONTINUED | OUTPATIENT
Start: 2025-01-13 | End: 2025-01-13 | Stop reason: HOSPADM

## 2025-01-13 RX ADMIN — SODIUM CHLORIDE 141.6 ML: 9 INJECTION, SOLUTION INTRAVENOUS at 11:40

## 2025-01-13 RX ADMIN — ACETAMINOPHEN 650 MG: 325 TABLET ORAL at 16:31

## 2025-01-13 RX ADMIN — SODIUM CHLORIDE 100 ML/HR: 9 INJECTION, SOLUTION INTRAVENOUS at 16:33

## 2025-01-13 NOTE — H&P
[facility] Cardiology, Preprocedure H&P  Date of Hospital Visit: 25  Encounter Provider: Nesha Pelaez PA-C    Place of Service: [facility]  Patient Name: Fatmata Murdock  :1950  MRN: [id number]     Primary Care Provider: Miesha Lobato APRN    Chief complaint: Aortic regurgitation    PROBLEM LIST  Aortic aneurysm  CT scan for coronary artery calcification 2020 revealed an enlargement of the ascending aorta up to 4.1 cm.  The coronary artery calcium score was 4.  Echocardiogram, 2021: EF 60%. Moderate dilation on ascending aorta. Aortic size index 2.64. Moderate AR.   Echocardiogram, 2022: EF 60%. Aortic root dilation 3.8 cm. Moderate AR.  Aortic size index 2.8.  CT scan of the chest with contrast 3/15/2022 revealed a 4 cm a sending aorta at the level of the main pulmonary artery.  (Also noted is a 12 x 9 x 5 mm calcified right paracentral structure at the T4-5 level which could be a calcified chronic disc excreted extrusion or meningioma.)  CT chest, 2024; Lungs are essentially clear with no focal airspace disease or centrilobular nodules as would be expected with atypical infectious etiology. No hilar or mediastinal lymphadenopathy. 4.59 cm transverse diameter ascending thoracic aorta constitutes aneurysmal dilatation. Calcified scar/fibrosis right upper lobe is stable and is along the right minor fissure. Non-obstructing left kidney stone. No hydronephrosis. Mild cardiomegaly. Other nonacute findings as above.  CTA chest, 10/31/2024; Stable borderline aneurysmal ectasia of the thoracic aorta including 4.0 cm greatest transverse dimension of the ascending thoracic aorta. The proximal descending thoracic aorta measures up to 3.1 cm. There is no evidence of dissection or other acute aortic pathology. There are no acute nonvascular findings.  US AAA, 2024; The proximal aorta measures 1.4 cm. The mid aorta measures 1.5 cm. The distal aorta  measures 2.3 cm. No evidence of an abdominal aneurysm.   Aortic regurgitation   Echocardiogram, 2/22/2022: EF 60%. Aortic root dilation 3.8 cm. Moderate AR.  Aortic size index 2.8.  Echocardiogram 4/19/2023: EF 55%, moderate AI, mild MR and TR, mild dilation of the ascending aorta is present.  Aortic size index 2.9 cm/m²  Echo, 10/11/2023: EF 55%. Moderate AR. Mild MR/TR. RVSP is 19 mmHg. The aortic size index is 2.8 cm/m².   Echocardiogram, 10/29/2024; EF 60%. Moderate AR. Moderate dilation of the ascending aorta is present. Aortic size index is 3.1 cm/m2. Compared to the October 2023 study the left ventricular size is similar, the aortic insufficiency remains moderate. The aortic size index has increased from 2.8 to 3.1 cm/m².   Family history of CAD  Osteoporosis  Dr. Guo  Thoracic intradural tumor  Followed by Dr. Evans  CT scan of the chest with contrast 3/15/2022 revealed a 4 cm a sending aorta at the level of the main pulmonary artery.  (Also noted is a 12 x 9 x 5 mm calcified right paracentral structure at the T4-5 level which could be a calcified chronic disc excreted extrusion or meningioma.  MRI thoracic spine, 12/08/2022; 14 x 10 x 10 mm extradural enhancing intradural extra medullary finding at the level of T4, favoring meningioma, versus possible schwannoma given mild adjacent involvement of the right neural foramen. The adjacent thoracic spinal cord is effaced and displaced posteriorly, otherwise without focal signal abnormality concerning for cord edema.   Hypothyroidism  Right subdural hematoma, s/p two shayy hole washout, 7/30/19, [facility]  Hypogammaglobulinemia  Followed by Dr. Guan  IgG infusions monthly  S/p vagotomy for duodenal ulcer    History of Present Illness:  Patient is a 74-year-old history of aortic regurgitation and ascending aortic aneurysm.  At her last office appointment she was referred to Dr. Segura to be evaluated given her aortic regurgitation and ascending aortic aneurysm  "for possible valve replacement and/or aneurysm repair.  Patient was seen here on 1/2 for evaluation.  She is tentatively scheduled for median sternotomy with aortic valve replacement and possible ascending aortic aneurysm repair.  She is here today for left heart catheterization in preparation of this surgery.  Patient has never had a heart catheterization.  She did take 2 Excedrin this morning for headache prior to arrival.  Each Excedrin has 250 mg of aspirin in it.  Patient also tells me that she has had a prior infection in her right thumb caused by a chicken.  She would prefer to not proceed with right radial approach given that.      I reviewed patient's past medical history, surgical history, family history and social history.    Current Outpatient Medications   Medication Instructions    buPROPion SR (WELLBUTRIN SR) 200 mg    Cyanocobalamin (VITAMIN B-12 IJ)     estradiol (VIVELLE-DOT) 0.1 MG/24HR patch 1 patch, Transdermal, 2 Times Weekly    FOLIC ACID PO Take  by mouth.    levothyroxine (SYNTHROID, LEVOTHROID) 112 mcg, Oral, Daily    nebivolol (BYSTOLIC) 2.5 mg, Oral, Daily    Unable to find 1 each, Every 30 Days          Scheduled Meds:[START ON 1/14/2025] aspirin, 81 mg, Oral, Daily  sodium chloride, 3 mL/kg, Intravenous, Once Over 1 Hour      Continuous Infusions: None      Review of Systems   negative except as outlined in the H&P above.           Objective:     Vitals:    01/13/25 1122 01/13/25 1123   BP: 142/63 145/68   BP Location: Right arm Left arm   Patient Position: Lying Lying   Pulse: 63 59   Resp: 16    Temp: 97.4 °F (36.3 °C)    TempSrc: Temporal    SpO2: 99% 99%   Weight: 48 kg (105 lb 12.8 oz)    Height: 152.4 cm (60\")        Body mass index is 20.66 kg/m².  Flowsheet Rows      Flowsheet Row First Filed Value   Admission Height 152.4 cm (60\") Documented at 01/13/2025 1122   Admission Weight 48 kg (105 lb 12.8 oz) Documented at 01/13/2025 1122          No intake or output data in the 24 " hours ending 01/13/25 1142    Physical Exam  General: Alert and oriented.  Neck: Jugular venous pressure is within normal limits. Carotids have normal upstrokes without bruits.   Cardiovascular: Regular rate and rhythm. 2/6 diastolic murmur, no gallop or rub.  Lungs: Clear, no rales or wheezes. Equal expansion is noted.   Extremities: Show no edema.  Skin: Warm and dry.  Neurologic: Nonfocal.     Lab Review:                CBC:      Lab 01/13/25  1127   WBC 8.72   HEMOGLOBIN 14.1   HEMATOCRIT 42.8   PLATELETS 196   MCV 97.3*     BMP:  Pending at the time of dictation.  Last creatinine checked 12/6/2024 is 0.59 with a GFR greater than 60.      Results for orders placed during the hospital encounter of 10/29/24    Adult Transthoracic Echo Complete W/ Cont if Necessary Per Protocol    Interpretation Summary    Left ventricular systolic function is normal. Estimated left ventricular EF = 60%    Moderate aortic valve regurgitation is present.    Moderate dilation of the ascending aorta is present.    Aortic size index is 3.1 cm/m².    Compared to the October 2023 study the left ventricular size is similar, the aortic insufficiency remains moderate.  The aortic size index has increased from 2.8 to 3.1 cm/m².               Assessment:   Aortic regurgitation  Ascending aortic aneurysm      Plan:   Proceed with left heart catheterization plus or minus catheter-based intervention by Dr. Velasco in preparation for bioprosthetic AVR and/or aortic aneurysm repair by Dr. Segura.  Patient request right femoral approach given previous infection in right thumb and wanting to reduce trauma to the general area of her hand and wrist.  We discussed left wrist and she is agreeable to that.  Risk and benefits discussed with the patient and she agrees to proceed.  Further recommendations to be made postprocedure by Dr. Velasco.      BENJAMIN Rodriguez MD, [facility]

## 2025-01-14 ENCOUNTER — DOCUMENTATION (OUTPATIENT)
Dept: CARDIAC REHAB | Facility: HOSPITAL | Age: 75
End: 2025-01-14
Payer: MEDICARE

## 2025-01-14 ENCOUNTER — TELEPHONE (OUTPATIENT)
Dept: CARDIOLOGY | Facility: CLINIC | Age: 75
End: 2025-01-14
Payer: MEDICARE

## 2025-01-14 NOTE — TELEPHONE ENCOUNTER
Patient left voice mail message.  She has questions about [facility] site care and her discharge paperwork.  She requests return call.  Spoke with patient.  Femoral [facility] site care given - leave tegaderm until tomorrow morning which at that time she can remove.  She can shower, but she is not to use any sprays, lotions or creams in that area.  Also she is not to rub/scrub and only wash with her hand and soap.  She is to rinse well.  She states she left her DC summary at the hospital, but does not have any questions at this time.  The patient is encouraged to call if she has any questions.  She verbalizes understanding.

## 2025-02-11 ENCOUNTER — READMISSION MANAGEMENT (OUTPATIENT)
Dept: CALL CENTER | Facility: HOSPITAL | Age: 75
End: 2025-02-11
Payer: MEDICARE

## 2025-02-11 NOTE — OUTREACH NOTE
Prep Survey      Flowsheet Row Responses   [facility] facility patient discharged from? Non-BH   Is LACE score < 7 ? Non-BH Discharge   Eligibility Not Eligible   What are the reasons patient is not eligible? Other  [PCP listed as specialist]   Does the patient have one of the following disease processes/diagnoses(primary or secondary)? Other   Prep survey completed? Yes            Indigo WILEY - Registered Nurse

## 2025-02-14 ENCOUNTER — TELEPHONE (OUTPATIENT)
Dept: CARDIOLOGY | Facility: CLINIC | Age: 75
End: 2025-02-14
Payer: MEDICARE

## 2025-02-14 NOTE — TELEPHONE ENCOUNTER
Spoke with patient.  She wants to know what [facility] wants her to do about her medications.  She is instructed to follow the /Dr. Segura discharge orders and verbalizes understanding.

## 2025-02-14 NOTE — TELEPHONE ENCOUNTER
Caller: Fatmata Murdock    Relationship: Self    Best call back number: [phone]    What is the best time to reach you: ANYTIME    Who are you requesting to speak with (clinical staff, provider,  specific staff member): ANYONE    What was the call regarding: PATIENT WOULD LIKE A CALL BACK TO DISCUSS MEDICATIONS THAT WERE PRESCRIBED AFTER HER PROCEDURE.

## 2025-02-17 ENCOUNTER — TELEPHONE (OUTPATIENT)
Dept: ENDOCRINOLOGY | Facility: CLINIC | Age: 75
End: 2025-02-17

## 2025-02-17 NOTE — TELEPHONE ENCOUNTER
Patient states she just had open heart surgery and was going through her medications. She wanted to confirm the dose of her levothyroxine.     Please call patient back at [phone]

## 2025-03-13 ENCOUNTER — TELEPHONE (OUTPATIENT)
Dept: CARDIOLOGY | Facility: CLINIC | Age: 75
End: 2025-03-13
Payer: MEDICARE

## 2025-03-13 NOTE — TELEPHONE ENCOUNTER
Spoke with patient.  She is instructed of [facility] thoughts on her visual events.  Also, that I have talked to Dr. Schneider this morning.  She is advised to follow up with him and to call Dr. Segura to make him aware.  She verbalizes understanding.

## 2025-03-13 NOTE — TELEPHONE ENCOUNTER
"Patient left voice mail message.  She states she has seen her retina specialist - Dr. Ruperto Schneider.  He told her she needed \"worked up\" by us for amaurosis events in her left eye.  She requests a return call.  Spoke with [facility].  She feels these are r/t patients CT surgery to repair an ascending aorta aneurysm and cross clamp during surgery.  Spoke with Dr. Schneider.  He states patient has had multiple visual events since surgery.  They are occurring once a week to ten days apart and lasting 30 seconds.  He states they are \"not actually\" amaurosis because they are too short.  Discussed with him patients recent surgery.  I will discuss with [facility] as well.  He states he is aware of her recent surgeries.  He states he just wanted [facility] to be aware.  I will discuss with [facility].  LM with patient as above.    "

## 2025-03-17 ENCOUNTER — TELEPHONE (OUTPATIENT)
Dept: NEUROSURGERY | Facility: CLINIC | Age: 75
End: 2025-03-17
Payer: MEDICARE

## 2025-03-17 NOTE — TELEPHONE ENCOUNTER
PATIENT - ROBI FELIX    CALLER: DANIELLA FELIPE/  [facility]    PROVIDER: DR. NORTON    REASON FOR CALL:     RECEIVED A CALL ABOUT GETTING THE PT IN - STATES IT MIGHT BE RELATED TO THE THORACIC TUMOR/MENINGIOMA, BUT PATIENT IS CURRENTLY BEING DIAGNOSED WITH A NEW SYMPTOM - DX: H53.129 - AND AN ECO & CAROTID DUPLEX HAVE BEEN ORDERED - THEY ARE CHECKING ON A REFERRAL JUST IN CASE WE NEED ONE, BUT WANTED ME TO FIND OUT FROM DR. NORTON IF WE COULD GET THE PT IN SOON.    CALL DANIELLA BACK TO [facility] - HIS DIRECT PHONE: [phone]

## 2025-03-18 ENCOUNTER — TRANSCRIBE ORDERS (OUTPATIENT)
Dept: ADMINISTRATIVE | Facility: HOSPITAL | Age: 75
End: 2025-03-18
Payer: MEDICARE

## 2025-03-18 DIAGNOSIS — H53.129 TRANSIENT VISUAL LOSS, UNSPECIFIED LATERALITY: ICD-10-CM

## 2025-03-18 DIAGNOSIS — Z98.890 S/P AORTIC ANEURYSM REPAIR: Primary | ICD-10-CM

## 2025-03-18 DIAGNOSIS — Z86.79 S/P AORTIC ANEURYSM REPAIR: Primary | ICD-10-CM

## 2025-03-18 DIAGNOSIS — H53.123 TRANSIENT VISUAL LOSS, BILATERAL: ICD-10-CM

## 2025-03-24 ENCOUNTER — HOSPITAL ENCOUNTER (OUTPATIENT)
Dept: CARDIOLOGY | Facility: HOSPITAL | Age: 75
Discharge: HOME | End: 2025-03-24
Admitting: THORACIC SURGERY (CARDIOTHORACIC VASCULAR SURGERY)
Payer: MEDICARE

## 2025-03-24 DIAGNOSIS — H53.129 TRANSIENT VISUAL LOSS, UNSPECIFIED LATERALITY: ICD-10-CM

## 2025-03-24 DIAGNOSIS — H53.123 TRANSIENT VISUAL LOSS, BILATERAL: ICD-10-CM

## 2025-03-24 DIAGNOSIS — Z98.890 S/P AORTIC ANEURYSM REPAIR: ICD-10-CM

## 2025-03-24 DIAGNOSIS — Z86.79 S/P AORTIC ANEURYSM REPAIR: ICD-10-CM

## 2025-03-24 PROCEDURE — 93306 TTE W/DOPPLER COMPLETE: CPT

## 2025-03-24 PROCEDURE — 93306 TTE W/DOPPLER COMPLETE: CPT | Performed by: INTERNAL MEDICINE

## 2025-03-25 LAB
AORTIC DIMENSIONLESS INDEX: 0.52 (DI)
AV MEAN PRESS GRAD SYS DOP V1V2: 9 MMHG
AV VMAX SYS DOP: 259 CM/SEC
BH CV ECHO MEAS - AO MAX PG: 26.8 MMHG
BH CV ECHO MEAS - AO ROOT AREA (BSA CORRECTED): 2.4 CM2
BH CV ECHO MEAS - AO ROOT DIAM: 3.4 CM
BH CV ECHO MEAS - AO V2 VTI: 35.3 CM
BH CV ECHO MEAS - AVA(I,D): 1.7 CM2
BH CV ECHO MEAS - EDV(CUBED): 68.9 ML
BH CV ECHO MEAS - EDV(MOD-SP2): 88.1 ML
BH CV ECHO MEAS - EDV(MOD-SP4): 80.6 ML
BH CV ECHO MEAS - EF(MOD-SP2): 65.5 %
BH CV ECHO MEAS - EF(MOD-SP4): 59.9 %
BH CV ECHO MEAS - ESV(CUBED): 14.7 ML
BH CV ECHO MEAS - ESV(MOD-SP2): 30.4 ML
BH CV ECHO MEAS - ESV(MOD-SP4): 32.3 ML
BH CV ECHO MEAS - FS: 40.3 %
BH CV ECHO MEAS - IVS/LVPW: 1 CM
BH CV ECHO MEAS - IVSD: 1 CM
BH CV ECHO MEAS - LA DIMENSION: 4.1 CM
BH CV ECHO MEAS - LAT PEAK E' VEL: 9.8 CM/SEC
BH CV ECHO MEAS - LV DIASTOLIC VOL/BSA (35-75): 56.8 CM2
BH CV ECHO MEAS - LV MASS(C)D: 151.3 GRAMS
BH CV ECHO MEAS - LV MAX PG: 5 MMHG
BH CV ECHO MEAS - LV MEAN PG: 3 MMHG
BH CV ECHO MEAS - LV SYSTOLIC VOL/BSA (12-30): 22.8 CM2
BH CV ECHO MEAS - LV V1 MAX: 112 CM/SEC
BH CV ECHO MEAS - LV V1 VTI: 18.7 CM
BH CV ECHO MEAS - LVIDD: 4.1 CM
BH CV ECHO MEAS - LVIDS: 2.45 CM
BH CV ECHO MEAS - LVOT AREA: 3.2 CM2
BH CV ECHO MEAS - LVOT DIAM: 2.01 CM
BH CV ECHO MEAS - LVPWD: 1 CM
BH CV ECHO MEAS - MED PEAK E' VEL: 4.6 CM/SEC
BH CV ECHO MEAS - MR MAX PG: 132.3 MMHG
BH CV ECHO MEAS - MR MAX VEL: 575 CM/SEC
BH CV ECHO MEAS - MR MEAN PG: 97.4 MMHG
BH CV ECHO MEAS - MR MEAN VEL: 467.1 CM/SEC
BH CV ECHO MEAS - MR VTI: 143.1 CM
BH CV ECHO MEAS - MV A MAX VEL: 64.3 CM/SEC
BH CV ECHO MEAS - MV DEC SLOPE: 372.4 CM/SEC2
BH CV ECHO MEAS - MV DEC TIME: 0.17 SEC
BH CV ECHO MEAS - MV E MAX VEL: 57.8 CM/SEC
BH CV ECHO MEAS - MV E/A: 0.9
BH CV ECHO MEAS - MV MAX PG: 2.39 MMHG
BH CV ECHO MEAS - MV MEAN PG: 1.47 MMHG
BH CV ECHO MEAS - MV P1/2T: 57.6 MSEC
BH CV ECHO MEAS - MV V2 VTI: 17.4 CM
BH CV ECHO MEAS - MVA(P1/2T): 3.8 CM2
BH CV ECHO MEAS - MVA(VTI): 3.4 CM2
BH CV ECHO MEAS - RAP SYSTOLE: 3 MMHG
BH CV ECHO MEAS - RVSP: 30 MMHG
BH CV ECHO MEAS - SV(LVOT): 59.6 ML
BH CV ECHO MEAS - SV(MOD-SP2): 57.7 ML
BH CV ECHO MEAS - SV(MOD-SP4): 48.3 ML
BH CV ECHO MEAS - SVI(LVOT): 42 ML/M2
BH CV ECHO MEAS - SVI(MOD-SP2): 40.6 ML/M2
BH CV ECHO MEAS - SVI(MOD-SP4): 34 ML/M2
BH CV ECHO MEAS - TR MAX PG: 26.9 MMHG
BH CV ECHO MEAS - TR MAX VEL: 259.1 CM/SEC
BH CV ECHO MEASUREMENTS AVERAGE E/E' RATIO: 8.03
BH CV XLRA - RV BASE: 2.9 CM
BH CV XLRA - RV LENGTH: 6 CM
BH CV XLRA - RV MID: 2.37 CM
BH CV XLRA - TDI S': 8.9 CM/SEC
LEFT ATRIUM VOLUME INDEX: 33.2 ML/M2
LV EF 2D ECHO EST: 60 %
LV EF BIPLANE MOD: 62.9 %

## 2025-04-02 ENCOUNTER — TELEPHONE (OUTPATIENT)
Dept: GASTROENTEROLOGY | Facility: CLINIC | Age: 75
End: 2025-04-02
Payer: MEDICARE

## 2025-04-02 NOTE — TELEPHONE ENCOUNTER
PATIENT CALLED AND STATES THAT SHE HAS BEEN HAVING SOME BOWEL ISSUES AND LOWER ABDOMINAL PAIN. THE PATIENT STATES THAT SHE DOES NOT KNOW IF THIS IS CAUSED BY HER OPEN HEART SURGERY THAT SHE JUST HAD OR WHAT.     PATIENT STATES THAT SHE ALSO HAD A LARGE GAS BUBBLE DOING HER SURGERY . THIS SURGERY WAS DONE AT  BY DR STORM.    THE PATIENT STATES THAT SHE WOULD LIKE TO GET A CALL BACK IN REGARDS TO THIS MATTER .

## 2025-04-16 ENCOUNTER — HOSPITAL ENCOUNTER (OUTPATIENT)
Dept: CARDIOLOGY | Facility: HOSPITAL | Age: 75
Discharge: HOME | End: 2025-04-16
Admitting: INTERNAL MEDICINE
Payer: MEDICARE

## 2025-04-16 ENCOUNTER — OFFICE VISIT (OUTPATIENT)
Dept: CARDIOLOGY | Facility: CLINIC | Age: 75
End: 2025-04-16
Payer: MEDICARE

## 2025-04-16 VITALS
HEIGHT: 60 IN | HEART RATE: 73 BPM | BODY MASS INDEX: 19.44 KG/M2 | WEIGHT: 99 LBS | SYSTOLIC BLOOD PRESSURE: 160 MMHG | OXYGEN SATURATION: 94 % | DIASTOLIC BLOOD PRESSURE: 100 MMHG

## 2025-04-16 DIAGNOSIS — G45.3 AMAUROSIS FUGAX OF LEFT EYE: ICD-10-CM

## 2025-04-16 DIAGNOSIS — I71.20 THORACIC AORTIC ANEURYSM WITHOUT RUPTURE, UNSPECIFIED PART: ICD-10-CM

## 2025-04-16 DIAGNOSIS — I35.1 NONRHEUMATIC AORTIC VALVE INSUFFICIENCY: Primary | ICD-10-CM

## 2025-04-16 DIAGNOSIS — I35.1 NONRHEUMATIC AORTIC VALVE INSUFFICIENCY: ICD-10-CM

## 2025-04-16 PROCEDURE — 93000 ELECTROCARDIOGRAM COMPLETE: CPT | Performed by: INTERNAL MEDICINE

## 2025-04-16 PROCEDURE — 1160F RVW MEDS BY RX/DR IN RCRD: CPT | Performed by: INTERNAL MEDICINE

## 2025-04-16 PROCEDURE — 99214 OFFICE O/P EST MOD 30 MIN: CPT | Performed by: INTERNAL MEDICINE

## 2025-04-16 PROCEDURE — 1159F MED LIST DOCD IN RCRD: CPT | Performed by: INTERNAL MEDICINE

## 2025-04-16 PROCEDURE — 93880 EXTRACRANIAL BILAT STUDY: CPT

## 2025-04-16 RX ORDER — ACETAMINOPHEN 325 MG/1
650 TABLET ORAL
COMMUNITY
Start: 2025-02-11

## 2025-04-16 RX ORDER — CLOPIDOGREL BISULFATE 75 MG/1
75 TABLET ORAL DAILY
Qty: 90 TABLET | Refills: 3 | Status: SHIPPED | OUTPATIENT
Start: 2025-04-16 | End: 2025-04-17 | Stop reason: SDUPTHER

## 2025-04-16 NOTE — H&P (VIEW-ONLY)
[facility]    Patient ID: Fatmata Murdock is a 74 y.o. female.  : 1950   Contact: [phone]    Encounter date: 2025    PCP: Miesha Lobato APRN      Chief complaint:   Chief Complaint   Patient presents with    Nonrheumatic aortic valve insufficiency    Dizziness       Problem List:  Aortic aneurysm  CT scan for coronary artery calcification 2020 revealed an enlargement of the ascending aorta up to 4.1 cm.  The coronary artery calcium score was 4.  Echocardiogram, 2021: EF 60%. Moderate dilation on ascending aorta. Aortic size index 2.64. Moderate AR.   Echocardiogram, 2022: EF 60%. Aortic root dilation 3.8 cm. Moderate AR.  Aortic size index 2.8.  CT scan of the chest with contrast 3/15/2022 revealed a 4 cm a sending aorta at the level of the main pulmonary artery.  (Also noted is a 12 x 9 x 5 mm calcified right paracentral structure at the T4-5 level which could be a calcified chronic disc excreted extrusion or meningioma.)  CT chest, 2024; Lungs are essentially clear with no focal airspace disease or centrilobular nodules as would be expected with atypical infectious etiology. No hilar or mediastinal lymphadenopathy. 4.59 cm transverse diameter ascending thoracic aorta constitutes aneurysmal dilatation. Calcified scar/fibrosis right upper lobe is stable and is along the right minor fissure. Non-obstructing left kidney stone. No hydronephrosis. Mild cardiomegaly. Other nonacute findings as above.  CTA chest, 10/31/2024; Stable borderline aneurysmal ectasia of the thoracic aorta including 4.0 cm greatest transverse dimension of the ascending thoracic aorta. The proximal descending thoracic aorta measures up to 3.1 cm. There is no evidence of dissection or other acute aortic pathology. There are no acute nonvascular findings.  US AAA, 2024; The proximal aorta measures 1.4 cm. The mid aorta measures 1.5 cm. The distal aorta  measures 2.3 cm. No evidence of an abdominal aneurysm.   Status post placement of a 26 mm dacron woven graft with preservation of the sinotubular junction and implantation of a 23 mm epic plus prosthesis in the aortic position by Dr. Theodore Segura 2/4/2025.  Aortic regurgitation   Echocardiogram, 2/22/2022: EF 60%. Aortic root dilation 3.8 cm. Moderate AR.  Aortic size index 2.8.  Echocardiogram 4/19/2023: EF 55%, moderate AI, mild MR and TR, mild dilation of the ascending aorta is present.  Aortic size index 2.9 cm/m²  Echo, 10/11/2023: EF 55%. Moderate AR. Mild MR/TR. RVSP is 19 mmHg. The aortic size index is 2.8 cm/m².   Echocardiogram, 10/29/2024; EF 60%. Moderate AR. Moderate dilation of the ascending aorta is present. Aortic size index is 3.1 cm/m2. Compared to the October 2023 study the left ventricular size is similar, the aortic insufficiency remains moderate. The aortic size index has increased from 2.8 to 3.1 cm/m².   Echo, 10/29/2024: EF 60%, moderate aortic valve regurgitation present. Moderate dilation of ascending aorta.   Echo, 03/24/2025: EF 60%, LV systolic function normal. LV borderline concentric hypertrophy. Aortic valve mean pressure 9 mmHg. Mild mitral valve regurgitation present. Mild tricuspid valve regurgitation present. RV systolic pressure from TR is 30 mmHg.   Status post placement of a 26 mm dacron woven graft with preservation of the sinotubular junction and implantation of a 23 mm epic plus prosthesis in the aortic position by Dr. Theodore Segura 2/4/2025.  Echocardiogram 3/24/2025: Mean aortic valve gradient 9 mmHg.  A trivial paravalvular leak is seen next to the anterior mitral valve leaflet.  Mild MR, mild TR.  LVEF 60%.  Family history of CAD  Osteoporosis  Dr. Guo  Thoracic intradural tumor  Followed by Dr. Evans  CT scan of the chest with contrast 3/15/2022 revealed a 4 cm a sending aorta at the level of the main pulmonary artery.  (Also noted is a 12 x 9 x 5 mm calcified  right paracentral structure at the T4-5 level which could be a calcified chronic disc excreted extrusion or meningioma.  MRI thoracic spine, 12/08/2022; 14 x 10 x 10 mm extradural enhancing intradural extra medullary finding at the level of T4, favoring meningioma, versus possible schwannoma given mild adjacent involvement of the right neural foramen. The adjacent thoracic spinal cord is effaced and displaced posteriorly, otherwise without focal signal abnormality concerning for cord edema.   Hypothyroidism  Right subdural hematoma, s/p two shayy hole washout, 7/30/19, [facility]  Hypogammaglobulinemia  Followed by Dr. Guan  IgG infusions monthly  S/p vagotomy for duodenal ulcer       Allergies   Allergen Reactions    Ceftriaxone Nausea And Vomiting, Hives and Other (See Comments)    Levofloxacin Swelling     Pruritus and vision change    Codeine Itching    Latex Provider Review Needed     latex    Effexor Xr [Venlafaxine Hcl Er] Other (See Comments)     Restless legs, dermatitis     Hydrocodone Unknown (See Comments)    Penicillins GI Intolerance    Rifampin Other (See Comments) and Unknown (See Comments)     Elevated liver enzymes    Serotonin Reuptake Inhibitors (Ssris) Unknown (See Comments)     Irritable and mean    Sulfa Antibiotics GI Intolerance       Current Medications:    Current Outpatient Medications:     acetaminophen (TYLENOL) 325 MG tablet, Take 2 tablets by mouth., Disp: , Rfl:     buPROPion SR (WELLBUTRIN SR) 200 MG 12 hr tablet, Take 1 tablet by mouth. 300mg a day, Disp: , Rfl:     Cyanocobalamin (VITAMIN B-12 IJ), , Disp: , Rfl:     estradiol (VIVELLE-DOT) 0.1 MG/24HR patch, Place 1 patch on the skin as directed by provider 2 (Two) Times a Week., Disp: 24 patch, Rfl: 3    Unable to find, 1 each Every 30 (Thirty) Days. Med Name: Immunoglobulin infusion, Disp: , Rfl:     clopidogrel (PLAVIX) 75 MG tablet, Take 1 tablet by mouth Daily., Disp: 90 tablet, Rfl: 3    HPI    Fatmata Murdock is a 74 y.o.  "female who presents today for a 4 MO follow up of nonrheumatic aortic valve insufficiency, and Thoracic aortic aneurism without rupture, unspecified part.  Since last visit, patient has been doing well overall from a cardiovascular standpoint. Patient maintains a stable activity level by lifting weights and aerobic exercise 3 times a week. She reports having an elevated blood pressure diastolic. She explains several events of loss of vision for a few seconds in her left eye. Her longest episode was stated to last a couple minutes and occurred while she was still in the hospital.  Her most recent event was on Saturday and lasted for 2 seconds.  Patient says she is always having headaches in the morning. Patient denies chest pain, shortness of breath, orthopnea, palpitations, edema, and syncope.       The following portions of the patient's history were reviewed and updated as appropriate: allergies, current medications and problem list.    Pertinent positives as listed in the HPI.  All other systems reviewed are negative.         Vitals:    04/16/25 1401 04/16/25 1428   BP: 140/88 160/100   BP Location: Left arm Left arm   Patient Position: Sitting    Pulse: 73    SpO2: 94%    Weight: 44.9 kg (99 lb)    Height: 152.4 cm (60\")        Physical Exam:  General: Alert and oriented.  Neck: Jugular venous pressure is within normal limits. Carotids have normal upstrokes without bruits.   Cardiovascular: Heart has a nondisplaced focal PMI. Regular rate and rhythm. No murmur, gallop or rub.  Lungs: Clear, no rales or wheezes. Equal expansion is noted.   Extremities: Show no edema.  Skin: Warm and dry.  Neurologic: Nonfocal.     Diagnostic Data (reviewed with patient):  Lab Results   Component Value Date    GLUCOSE 170 (H) 02/05/2025    BUN 21 01/13/2025    CREATININE 0.56 (L) 01/13/2025    BCR 37.5 (H) 01/13/2025     02/04/2025    K 4.1 02/05/2025     (H) 02/05/2025    CO2 28.0 01/13/2025    CALCIUM 9.8 01/13/2025 "    ALBUMIN 3.8 03/05/2024    ALKPHOS 57 03/05/2024    AST 23 03/05/2024    ALT 20 03/05/2024     Lab Results   Component Value Date    CHOL 166 01/13/2025    TRIG 60 01/13/2025    HDL 85 (H) 01/13/2025    LDL 69 01/13/2025      Lab Results   Component Value Date    WBC 6.64 03/06/2025    RBC 4.25 03/06/2025    HGB 13.2 03/06/2025    HCT 41.4 03/06/2025    MCV 97 03/06/2025     03/06/2025      Lab Results   Component Value Date    TSH 0.038 (L) 09/06/2024          ECG 12 Lead    Date/Time: 4/16/2025 2:26 PM  Performed by: Blanche Velasco MD    Authorized by: Blanche Velasco MD  Comparison: compared with previous ECG from 10/11/2023  Similar to previous ECG  Rhythm: sinus rhythm  Rate: normal  BPM: 68  Conduction: left anterior fascicular block    Clinical impression: abnormal EKG          Advance Care Planning   ACP discussion was held with the patient during this visit. Patient does not have an advance directive, declines further assistance.           Assessment:    ICD-10-CM ICD-9-CM   1. Nonrheumatic aortic valve insufficiency  I35.1 424.1   2. Amaurosis fugax of left eye  G45.3 362.34   3. Thoracic aortic aneurysm without rupture, status postrepair  I71.20 441.2         Plan:    Start on Plavix 75 mg daily for antiplatelet therapy.   Resume nebivolol 1.25 mg for hypertension.   Continue all other current medications.  Carotid duplex today.  If no significant findings are present on the carotid then we will proceed with transesophageal echocardiogram.  F/up in 6 months, sooner if needed.    Scribed for Blanche Velasco MD by Cristian Shafer. 4/16/2025 14:17 EDT    I Blanche Velasco MD personally performed the services described in this documentation as scribed by the above individual in my presence, and it is both accurate and complete.    Blacnhe Velasco MD, [facility]

## 2025-04-16 NOTE — PROGRESS NOTES
[facility]    Patient ID: Fatmata Murdock is a 74 y.o. female.  : 1950   Contact: [phone]    Encounter date: 2025    PCP: Miesha Lobato APRN      Chief complaint:   Chief Complaint   Patient presents with    Nonrheumatic aortic valve insufficiency    Dizziness       Problem List:  Aortic aneurysm  CT scan for coronary artery calcification 2020 revealed an enlargement of the ascending aorta up to 4.1 cm.  The coronary artery calcium score was 4.  Echocardiogram, 2021: EF 60%. Moderate dilation on ascending aorta. Aortic size index 2.64. Moderate AR.   Echocardiogram, 2022: EF 60%. Aortic root dilation 3.8 cm. Moderate AR.  Aortic size index 2.8.  CT scan of the chest with contrast 3/15/2022 revealed a 4 cm a sending aorta at the level of the main pulmonary artery.  (Also noted is a 12 x 9 x 5 mm calcified right paracentral structure at the T4-5 level which could be a calcified chronic disc excreted extrusion or meningioma.)  CT chest, 2024; Lungs are essentially clear with no focal airspace disease or centrilobular nodules as would be expected with atypical infectious etiology. No hilar or mediastinal lymphadenopathy. 4.59 cm transverse diameter ascending thoracic aorta constitutes aneurysmal dilatation. Calcified scar/fibrosis right upper lobe is stable and is along the right minor fissure. Non-obstructing left kidney stone. No hydronephrosis. Mild cardiomegaly. Other nonacute findings as above.  CTA chest, 10/31/2024; Stable borderline aneurysmal ectasia of the thoracic aorta including 4.0 cm greatest transverse dimension of the ascending thoracic aorta. The proximal descending thoracic aorta measures up to 3.1 cm. There is no evidence of dissection or other acute aortic pathology. There are no acute nonvascular findings.  US AAA, 2024; The proximal aorta measures 1.4 cm. The mid aorta measures 1.5 cm. The distal aorta  measures 2.3 cm. No evidence of an abdominal aneurysm.   Status post placement of a 26 mm dacron woven graft with preservation of the sinotubular junction and implantation of a 23 mm epic plus prosthesis in the aortic position by Dr. Theodore Segura 2/4/2025.  Aortic regurgitation   Echocardiogram, 2/22/2022: EF 60%. Aortic root dilation 3.8 cm. Moderate AR.  Aortic size index 2.8.  Echocardiogram 4/19/2023: EF 55%, moderate AI, mild MR and TR, mild dilation of the ascending aorta is present.  Aortic size index 2.9 cm/m²  Echo, 10/11/2023: EF 55%. Moderate AR. Mild MR/TR. RVSP is 19 mmHg. The aortic size index is 2.8 cm/m².   Echocardiogram, 10/29/2024; EF 60%. Moderate AR. Moderate dilation of the ascending aorta is present. Aortic size index is 3.1 cm/m2. Compared to the October 2023 study the left ventricular size is similar, the aortic insufficiency remains moderate. The aortic size index has increased from 2.8 to 3.1 cm/m².   Echo, 10/29/2024: EF 60%, moderate aortic valve regurgitation present. Moderate dilation of ascending aorta.   Echo, 03/24/2025: EF 60%, LV systolic function normal. LV borderline concentric hypertrophy. Aortic valve mean pressure 9 mmHg. Mild mitral valve regurgitation present. Mild tricuspid valve regurgitation present. RV systolic pressure from TR is 30 mmHg.   Status post placement of a 26 mm dacron woven graft with preservation of the sinotubular junction and implantation of a 23 mm epic plus prosthesis in the aortic position by Dr. Theodore Segura 2/4/2025.  Echocardiogram 3/24/2025: Mean aortic valve gradient 9 mmHg.  A trivial paravalvular leak is seen next to the anterior mitral valve leaflet.  Mild MR, mild TR.  LVEF 60%.  Family history of CAD  Osteoporosis  Dr. Guo  Thoracic intradural tumor  Followed by Dr. Evans  CT scan of the chest with contrast 3/15/2022 revealed a 4 cm a sending aorta at the level of the main pulmonary artery.  (Also noted is a 12 x 9 x 5 mm calcified  right paracentral structure at the T4-5 level which could be a calcified chronic disc excreted extrusion or meningioma.  MRI thoracic spine, 12/08/2022; 14 x 10 x 10 mm extradural enhancing intradural extra medullary finding at the level of T4, favoring meningioma, versus possible schwannoma given mild adjacent involvement of the right neural foramen. The adjacent thoracic spinal cord is effaced and displaced posteriorly, otherwise without focal signal abnormality concerning for cord edema.   Hypothyroidism  Right subdural hematoma, s/p two shayy hole washout, 7/30/19, [facility]  Hypogammaglobulinemia  Followed by Dr. Guan  IgG infusions monthly  S/p vagotomy for duodenal ulcer       Allergies   Allergen Reactions    Ceftriaxone Nausea And Vomiting, Hives and Other (See Comments)    Levofloxacin Swelling     Pruritus and vision change    Codeine Itching    Latex Provider Review Needed     latex    Effexor Xr [Venlafaxine Hcl Er] Other (See Comments)     Restless legs, dermatitis     Hydrocodone Unknown (See Comments)    Penicillins GI Intolerance    Rifampin Other (See Comments) and Unknown (See Comments)     Elevated liver enzymes    Serotonin Reuptake Inhibitors (Ssris) Unknown (See Comments)     Irritable and mean    Sulfa Antibiotics GI Intolerance       Current Medications:    Current Outpatient Medications:     acetaminophen (TYLENOL) 325 MG tablet, Take 2 tablets by mouth., Disp: , Rfl:     buPROPion SR (WELLBUTRIN SR) 200 MG 12 hr tablet, Take 1 tablet by mouth. 300mg a day, Disp: , Rfl:     Cyanocobalamin (VITAMIN B-12 IJ), , Disp: , Rfl:     estradiol (VIVELLE-DOT) 0.1 MG/24HR patch, Place 1 patch on the skin as directed by provider 2 (Two) Times a Week., Disp: 24 patch, Rfl: 3    Unable to find, 1 each Every 30 (Thirty) Days. Med Name: Immunoglobulin infusion, Disp: , Rfl:     clopidogrel (PLAVIX) 75 MG tablet, Take 1 tablet by mouth Daily., Disp: 90 tablet, Rfl: 3    HPI    Fatmata Murdock is a 74 y.o.  "female who presents today for a 4 MO follow up of nonrheumatic aortic valve insufficiency, and Thoracic aortic aneurism without rupture, unspecified part.  Since last visit, patient has been doing well overall from a cardiovascular standpoint. Patient maintains a stable activity level by lifting weights and aerobic exercise 3 times a week. She reports having an elevated blood pressure diastolic. She explains several events of loss of vision for a few seconds in her left eye. Her longest episode was stated to last a couple minutes and occurred while she was still in the hospital.  Her most recent event was on Saturday and lasted for 2 seconds.  Patient says she is always having headaches in the morning. Patient denies chest pain, shortness of breath, orthopnea, palpitations, edema, and syncope.       The following portions of the patient's history were reviewed and updated as appropriate: allergies, current medications and problem list.    Pertinent positives as listed in the HPI.  All other systems reviewed are negative.         Vitals:    04/16/25 1401 04/16/25 1428   BP: 140/88 160/100   BP Location: Left arm Left arm   Patient Position: Sitting    Pulse: 73    SpO2: 94%    Weight: 44.9 kg (99 lb)    Height: 152.4 cm (60\")        Physical Exam:  General: Alert and oriented.  Neck: Jugular venous pressure is within normal limits. Carotids have normal upstrokes without bruits.   Cardiovascular: Heart has a nondisplaced focal PMI. Regular rate and rhythm. No murmur, gallop or rub.  Lungs: Clear, no rales or wheezes. Equal expansion is noted.   Extremities: Show no edema.  Skin: Warm and dry.  Neurologic: Nonfocal.     Diagnostic Data (reviewed with patient):  Lab Results   Component Value Date    GLUCOSE 170 (H) 02/05/2025    BUN 21 01/13/2025    CREATININE 0.56 (L) 01/13/2025    BCR 37.5 (H) 01/13/2025     02/04/2025    K 4.1 02/05/2025     (H) 02/05/2025    CO2 28.0 01/13/2025    CALCIUM 9.8 01/13/2025 "    ALBUMIN 3.8 03/05/2024    ALKPHOS 57 03/05/2024    AST 23 03/05/2024    ALT 20 03/05/2024     Lab Results   Component Value Date    CHOL 166 01/13/2025    TRIG 60 01/13/2025    HDL 85 (H) 01/13/2025    LDL 69 01/13/2025      Lab Results   Component Value Date    WBC 6.64 03/06/2025    RBC 4.25 03/06/2025    HGB 13.2 03/06/2025    HCT 41.4 03/06/2025    MCV 97 03/06/2025     03/06/2025      Lab Results   Component Value Date    TSH 0.038 (L) 09/06/2024          ECG 12 Lead    Date/Time: 4/16/2025 2:26 PM  Performed by: Blanche Velasco MD    Authorized by: Blanche Velasco MD  Comparison: compared with previous ECG from 10/11/2023  Similar to previous ECG  Rhythm: sinus rhythm  Rate: normal  BPM: 68  Conduction: left anterior fascicular block    Clinical impression: abnormal EKG          Advance Care Planning   ACP discussion was held with the patient during this visit. Patient does not have an advance directive, declines further assistance.           Assessment:    ICD-10-CM ICD-9-CM   1. Nonrheumatic aortic valve insufficiency  I35.1 424.1   2. Amaurosis fugax of left eye  G45.3 362.34   3. Thoracic aortic aneurysm without rupture, status postrepair  I71.20 441.2         Plan:    Start on Plavix 75 mg daily for antiplatelet therapy.   Resume nebivolol 1.25 mg for hypertension.   Continue all other current medications.  Carotid duplex today.  If no significant findings are present on the carotid then we will proceed with transesophageal echocardiogram.  F/up in 6 months, sooner if needed.    Scribed for Blanche Velasco MD by Cristian Shafer. 4/16/2025 14:17 EDT    I Blanche Velsaco MD personally performed the services described in this documentation as scribed by the above individual in my presence, and it is both accurate and complete.    Blanche Velasco MD, [facility]

## 2025-04-17 ENCOUNTER — TELEPHONE (OUTPATIENT)
Dept: CARDIOLOGY | Facility: CLINIC | Age: 75
End: 2025-04-17
Payer: MEDICARE

## 2025-04-17 DIAGNOSIS — G45.3 AMAUROSIS FUGAX OF LEFT EYE: ICD-10-CM

## 2025-04-17 DIAGNOSIS — I71.20 THORACIC AORTIC ANEURYSM WITHOUT RUPTURE, UNSPECIFIED PART: ICD-10-CM

## 2025-04-17 DIAGNOSIS — I35.1 NONRHEUMATIC AORTIC VALVE INSUFFICIENCY: ICD-10-CM

## 2025-04-17 LAB
BH CV XLRA MEAS LEFT DIST CCA EDV: 17.2 CM/SEC
BH CV XLRA MEAS LEFT DIST CCA PSV: 41.4 CM/SEC
BH CV XLRA MEAS LEFT DIST ICA EDV: 23.1 CM/SEC
BH CV XLRA MEAS LEFT DIST ICA PSV: 57.9 CM/SEC
BH CV XLRA MEAS LEFT ICA/CCA RATIO: 1.21
BH CV XLRA MEAS LEFT MID CCA EDV: 18.3 CM/SEC
BH CV XLRA MEAS LEFT MID CCA PSV: 47.2 CM/SEC
BH CV XLRA MEAS LEFT MID ICA EDV: 23.7 CM/SEC
BH CV XLRA MEAS LEFT MID ICA PSV: 51.9 CM/SEC
BH CV XLRA MEAS LEFT PROX CCA EDV: 16.6 CM/SEC
BH CV XLRA MEAS LEFT PROX CCA PSV: 49.4 CM/SEC
BH CV XLRA MEAS LEFT PROX ECA EDV: 7.8 CM/SEC
BH CV XLRA MEAS LEFT PROX ECA PSV: 37 CM/SEC
BH CV XLRA MEAS LEFT PROX ICA EDV: 17.7 CM/SEC
BH CV XLRA MEAS LEFT PROX ICA PSV: 47.6 CM/SEC
BH CV XLRA MEAS LEFT PROX SCLA PSV: 83.3 CM/SEC
BH CV XLRA MEAS LEFT VERTEBRAL A EDV: 16.1 CM/SEC
BH CV XLRA MEAS LEFT VERTEBRAL A PSV: 39.6 CM/SEC
BH CV XLRA MEAS RIGHT DIST CCA EDV: 16.9 CM/SEC
BH CV XLRA MEAS RIGHT DIST CCA PSV: 45.4 CM/SEC
BH CV XLRA MEAS RIGHT DIST ICA EDV: 25.1 CM/SEC
BH CV XLRA MEAS RIGHT DIST ICA PSV: 60.6 CM/SEC
BH CV XLRA MEAS RIGHT ICA/CCA RATIO: 1.28
BH CV XLRA MEAS RIGHT MID CCA EDV: 20.3 CM/SEC
BH CV XLRA MEAS RIGHT MID CCA PSV: 61.9 CM/SEC
BH CV XLRA MEAS RIGHT MID ICA EDV: 36.3 CM/SEC
BH CV XLRA MEAS RIGHT MID ICA PSV: 78.8 CM/SEC
BH CV XLRA MEAS RIGHT PROX CCA EDV: 15.2 CM/SEC
BH CV XLRA MEAS RIGHT PROX CCA PSV: 55.8 CM/SEC
BH CV XLRA MEAS RIGHT PROX ECA EDV: 9.5 CM/SEC
BH CV XLRA MEAS RIGHT PROX ECA PSV: 48 CM/SEC
BH CV XLRA MEAS RIGHT PROX ICA EDV: 18.6 CM/SEC
BH CV XLRA MEAS RIGHT PROX ICA PSV: 40.2 CM/SEC
BH CV XLRA MEAS RIGHT PROX SCLA PSV: 89.5 CM/SEC
BH CV XLRA MEAS RIGHT VERTEBRAL A EDV: 18.6 CM/SEC
BH CV XLRA MEAS RIGHT VERTEBRAL A PSV: 42 CM/SEC

## 2025-04-17 RX ORDER — CLOPIDOGREL BISULFATE 75 MG/1
75 TABLET ORAL DAILY
Qty: 90 TABLET | Refills: 3 | Status: SHIPPED | OUTPATIENT
Start: 2025-04-17 | End: 2025-04-21 | Stop reason: SDUPTHER

## 2025-04-17 NOTE — TELEPHONE ENCOUNTER
Received voicemail from pt. She was requesting the results from the carotid ultrasound she completed yesterday.

## 2025-04-17 NOTE — TELEPHONE ENCOUNTER
Spoke to pt and informed her of results of carotid ultrasound. She verbalized understanding.       Nesha, since it was normal, do you want me to order the AGUILAR that Dr. Velasco discussed in her office note?

## 2025-04-17 NOTE — TELEPHONE ENCOUNTER
Message sent to Dr. Velasco to address about return to work after finishing cardiac rehab, and referral.

## 2025-04-18 DIAGNOSIS — E03.9 ACQUIRED HYPOTHYROIDISM: ICD-10-CM

## 2025-04-18 DIAGNOSIS — R79.82 CRP ELEVATED: Primary | ICD-10-CM

## 2025-04-18 NOTE — TELEPHONE ENCOUNTER
Spoke to pt and informed her that order is being placed for AGUILAR and she will be contacted to schedule. She verbalized understanding.

## 2025-04-21 ENCOUNTER — HOSPITAL ENCOUNTER (OUTPATIENT)
Dept: CARDIOLOGY | Facility: HOSPITAL | Age: 75
Discharge: HOME | End: 2025-04-21
Admitting: PHYSICIAN ASSISTANT
Payer: MEDICARE

## 2025-04-21 ENCOUNTER — TELEPHONE (OUTPATIENT)
Dept: CARDIOLOGY | Facility: CLINIC | Age: 75
End: 2025-04-21

## 2025-04-21 ENCOUNTER — PATIENT MESSAGE (OUTPATIENT)
Dept: CARDIOLOGY | Facility: CLINIC | Age: 75
End: 2025-04-21

## 2025-04-21 ENCOUNTER — TELEPHONE (OUTPATIENT)
Dept: CARDIOLOGY | Facility: CLINIC | Age: 75
End: 2025-04-21
Payer: MEDICARE

## 2025-04-21 VITALS
OXYGEN SATURATION: 96 % | BODY MASS INDEX: 19.44 KG/M2 | HEIGHT: 60 IN | SYSTOLIC BLOOD PRESSURE: 138 MMHG | DIASTOLIC BLOOD PRESSURE: 92 MMHG | RESPIRATION RATE: 19 BRPM | WEIGHT: 99 LBS | HEART RATE: 70 BPM

## 2025-04-21 DIAGNOSIS — I35.1 NONRHEUMATIC AORTIC VALVE INSUFFICIENCY: ICD-10-CM

## 2025-04-21 DIAGNOSIS — I71.20 THORACIC AORTIC ANEURYSM WITHOUT RUPTURE, UNSPECIFIED PART: ICD-10-CM

## 2025-04-21 DIAGNOSIS — I71.21 ANEURYSM OF ASCENDING AORTA WITHOUT RUPTURE: ICD-10-CM

## 2025-04-21 DIAGNOSIS — G45.3 AMAUROSIS FUGAX OF LEFT EYE: Primary | ICD-10-CM

## 2025-04-21 DIAGNOSIS — I48.0 PAROXYSMAL ATRIAL FIBRILLATION: ICD-10-CM

## 2025-04-21 DIAGNOSIS — G45.3 AMAUROSIS FUGAX OF LEFT EYE: ICD-10-CM

## 2025-04-21 LAB
AORTIC DIMENSIONLESS INDEX: 0.7 (DI)
AV MEAN PRESS GRAD SYS DOP V1V2: 4 MMHG
AV VMAX SYS DOP: 129 CM/SEC
BH CV ECHO MEAS - AO MAX PG: 6.7 MMHG
BH CV ECHO MEAS - AO V2 VTI: 24.8 CM
BH CV ECHO MEAS - AVA(I,D): 1.98 CM2
BH CV ECHO MEAS - LV MAX PG: 2.7 MMHG
BH CV ECHO MEAS - LV MEAN PG: 2 MMHG
BH CV ECHO MEAS - LV V1 MAX: 81.7 CM/SEC
BH CV ECHO MEAS - LV V1 VTI: 17.3 CM
BH CV ECHO MEAS - LVOT AREA: 2.8 CM2
BH CV ECHO MEAS - LVOT DIAM: 1.9 CM
BH CV ECHO MEAS - SV(LVOT): 49.1 ML
BH CV ECHO MEAS - SVI(LVOT): 35.4 ML/M2
BH CV ECHO SHUNT ASSESSMENT PERFORMED (HIDDEN SCRIPTING): 1
BH CV VAS BP RIGHT ARM: NORMAL MMHG
LV EF 2D ECHO EST: 55 %

## 2025-04-21 PROCEDURE — 99153 MOD SED SAME PHYS/QHP EA: CPT

## 2025-04-21 PROCEDURE — 25010000002 MIDAZOLAM PER 1 MG: Performed by: INTERNAL MEDICINE

## 2025-04-21 PROCEDURE — 93312 ECHO TRANSESOPHAGEAL: CPT

## 2025-04-21 PROCEDURE — 93325 DOPPLER ECHO COLOR FLOW MAPG: CPT

## 2025-04-21 PROCEDURE — 93325 DOPPLER ECHO COLOR FLOW MAPG: CPT | Performed by: INTERNAL MEDICINE

## 2025-04-21 PROCEDURE — 93320 DOPPLER ECHO COMPLETE: CPT

## 2025-04-21 PROCEDURE — 99152 MOD SED SAME PHYS/QHP 5/>YRS: CPT

## 2025-04-21 PROCEDURE — 93312 ECHO TRANSESOPHAGEAL: CPT | Performed by: INTERNAL MEDICINE

## 2025-04-21 PROCEDURE — 99152 MOD SED SAME PHYS/QHP 5/>YRS: CPT | Performed by: INTERNAL MEDICINE

## 2025-04-21 PROCEDURE — 93320 DOPPLER ECHO COMPLETE: CPT | Performed by: INTERNAL MEDICINE

## 2025-04-21 RX ORDER — MIDAZOLAM HYDROCHLORIDE 1 MG/ML
INJECTION, SOLUTION INTRAMUSCULAR; INTRAVENOUS
Status: COMPLETED | OUTPATIENT
Start: 2025-04-21 | End: 2025-04-21

## 2025-04-21 RX ORDER — NEBIVOLOL 2.5 MG/1
2.5 TABLET ORAL DAILY
COMMUNITY

## 2025-04-21 RX ORDER — CLOPIDOGREL BISULFATE 75 MG/1
75 TABLET ORAL DAILY
Qty: 90 TABLET | Refills: 3 | Status: SHIPPED | OUTPATIENT
Start: 2025-04-21 | End: 2025-04-21

## 2025-04-21 RX ORDER — ASPIRIN 325 MG
325 TABLET ORAL DAILY
COMMUNITY

## 2025-04-21 RX ORDER — ETOMIDATE 2 MG/ML
INJECTION INTRAVENOUS
Status: COMPLETED | OUTPATIENT
Start: 2025-04-21 | End: 2025-04-21

## 2025-04-21 RX ADMIN — ETOMIDATE 5 MG: 20 INJECTION, SOLUTION INTRAVENOUS at 09:12

## 2025-04-21 RX ADMIN — ETOMIDATE 5 MG: 20 INJECTION, SOLUTION INTRAVENOUS at 09:16

## 2025-04-21 RX ADMIN — ETOMIDATE 5 MG: 20 INJECTION, SOLUTION INTRAVENOUS at 09:21

## 2025-04-21 RX ADMIN — MIDAZOLAM 2 MG: 1 INJECTION INTRAMUSCULAR; INTRAVENOUS at 09:14

## 2025-04-21 RX ADMIN — ETOMIDATE 3 MG: 20 INJECTION, SOLUTION INTRAVENOUS at 09:30

## 2025-04-21 RX ADMIN — MIDAZOLAM 2 MG: 1 INJECTION INTRAMUSCULAR; INTRAVENOUS at 09:12

## 2025-04-21 NOTE — INTERVAL H&P NOTE
Carotid duplex 4/16/2025:  Right internal carotid artery demonstrates normal flow without evidence of hemodynamically significant stenosis.    Antegrade right vertebral flow.    Left internal carotid artery demonstrates normal flow without evidence of hemodynamically significant stenosis.    Antegrade left vertebral flow.    No carotid atherosclerosis seen.       H&P reviewed. The patient was examined and there are no changes to the H&P.      Transesophageal echocardiogram procedure, risks, and complications discussed with the patient and she is agreeable to proceed.  She had an episode of amaurosis fugax of the left eye.  Patient says that she has had vomiting with fentanyl in the past. She has also had a vagotomy in the past. She is still attending cardiac rehab 3 times a week after AVR.    Electronically signed by JOSHUA Mai, 04/21/25, 8:40 AM EDT.     Blanche Velasco MD, FACC       Medical Necessity Clause: The lesion was treated medically due to the following: Detail Level: Detailed Post-Care Instructions: WCI given Render Note In Bullet Format When Appropriate: No Include Z78.9 (Other Specified Conditions Influencing Health Status) As An Associated Diagnosis?: Yes Duration Of Freeze Thaw-Cycle (Seconds): 5-10 Medical Necessity Information: It is in your best interest to select a reason for this procedure from the list below. All of these items fulfill various CMS LCD requirements except the new and changing color options. Consent: Verbal consent obtained Duration Of Freeze Thaw-Cycle (Seconds): 0 Post-Care Instructions: The treatment site will redden, and this will be followed quickly by swelling and blistering. The burning sensation usually subsides promptly, but the patient may experience tenderness as long as 3 days. The patient may take Aspirin, Ibuprofen or Tylenol if needed for discomfort. The patient need not limit activities except for strenuous exercise such as gym classes when the growths are on the feet. Keep the area clean, you may wash the area with soap and water. Bandaging is not necessary, but may be done. You may also puncture a large blister to relieve pressure. Some growths will not be eliminated by one treatment, and will require additional treatment. Consent: Patient's verbal consent is given. Discussed possibility of redness, swelling, blistering and pain. Discussed possibility of hyper and hypopigmentation. Patient is aware treatment failure is also a possibility. Advised return to clinic if not resolved in a month.

## 2025-04-21 NOTE — TELEPHONE ENCOUNTER
Per [facility] - patients carotid was normal.  She will need a AGUILAR and 30 day monitor.  Orders placed as requested.  Also, please send letter to release patient to work.  Spoke with patient.  She is instructed of above and verbalize understanding.

## 2025-04-24 ENCOUNTER — OFFICE VISIT (OUTPATIENT)
Dept: NEUROSURGERY | Facility: CLINIC | Age: 75
End: 2025-04-24
Payer: MEDICARE

## 2025-04-24 VITALS — BODY MASS INDEX: 19.79 KG/M2 | TEMPERATURE: 97.3 F | WEIGHT: 100.8 LBS | HEIGHT: 60 IN

## 2025-04-24 DIAGNOSIS — G45.3 AMAUROSIS FUGAX OF LEFT EYE: Primary | ICD-10-CM

## 2025-04-24 NOTE — PROGRESS NOTES
Name: Fatmata Murdock    : 1950     MRN: [id number]     Primary Care Provider: Miesha Lobato APRN    Chief Complaint  Follow-up      History of Present Illness:  Fatmata Murdock is a 75 y.o. female is well-known to the neurosurgery office as Dr. Evans has been following her thoracic meningioma.  She comes in today for new symptoms.  She states in February of this year, she had open heart surgery for aortic valve replacement.  She states while in the hospital postoperatively, she states she had a 5-minute episode in which the vision in her left eye went completely white.  She was seen by a retina specialist who states there is nothing wrong with her retina.  She follows with Dr. Velasco, her cardiologist who believe this could be related to her surgery.  The patient states she continues to have episodes in which her left sided vision will go dark that occurs for a couple seconds.  This happens 1-2 times a week.  She has had a carotid duplex which showed no evidence of hemodynamically significant stenosis.  She also had a AGUILAR which showed no findings that would correlate with her symptoms.  She recently had a Holter monitor placed today.  She denies any other neurological symptoms when her vision changes.  Denies any unilateral weakness, facial droop, confusion, severe headaches.  She was started on Plavix by her cardiologist however did not tolerate side effects.  She states she now takes Excedrin daily and her cardiologist is aware of this.    PMHX  Allergies:  Allergies   Allergen Reactions    Ceftriaxone Nausea And Vomiting, Hives and Other (See Comments)    Levofloxacin Swelling     Pruritus and vision change    Clopidogrel Bisulfate Myalgia    Codeine Itching    Latex Provider Review Needed     latex    Effexor Xr [Venlafaxine Hcl Er] Other (See Comments)     Restless legs, dermatitis     Hydrocodone Unknown (See Comments)    Penicillins GI Intolerance    Rifampin Other (See Comments) and  Unknown (See Comments)     Elevated liver enzymes    Serotonin Reuptake Inhibitors (Ssris) Unknown (See Comments)     Irritable and mean    Sulfa Antibiotics GI Intolerance     Medications    Current Outpatient Medications:     acetaminophen (TYLENOL) 325 MG tablet, Take 2 tablets by mouth., Disp: , Rfl:     aspirin-acetaminophen-caffeine (EXCEDRIN MIGRAINE) 250-250-65 MG per tablet, Take 1 tablet by mouth Every 6 (Six) Hours As Needed for Headache., Disp: , Rfl:     buPROPion SR (WELLBUTRIN SR) 200 MG 12 hr tablet, Take 1 tablet by mouth. 300mg a day, Disp: , Rfl:     Cyanocobalamin (VITAMIN B-12 IJ), , Disp: , Rfl:     estradiol (VIVELLE-DOT) 0.1 MG/24HR patch, Place 1 patch on the skin as directed by provider 2 (Two) Times a Week., Disp: 24 patch, Rfl: 3    nebivolol (BYSTOLIC) 2.5 MG tablet, Take 1 tablet by mouth Daily., Disp: , Rfl:     Unable to find, 1 each Every 30 (Thirty) Days. Med Name: Immunoglobulin infusion, Disp: , Rfl:     aspirin 325 MG tablet, Take 1 tablet by mouth Daily. (Patient not taking: Reported on 4/24/2025), Disp: , Rfl:   Past Medical History:  Past Medical History:   Diagnosis Date    Anemia May 1997    The anemia began due to ulcer.    Aneurysm 4/2022    Anxiety     Aortic regurgitation 10/06/2023    Arthritis June 2005    Brain concussion 2/2023    Cancer     Cervical disc disorder 2020    Spine    Depression     Endometriosis     Fibroid     Fibroids     LEIOMYOMA OF UTERUS    GERD (gastroesophageal reflux disease) 2021    Occasionally    H/O gastric ulcer     Heart valve disease 2021     diagnosed regeration from the aerotic valve    Hypothyroidism     Iron deficiency     Leukopenia     Migraine     MRSA (methicillin resistant Staphylococcus aureus) September 2014    No comments    MRSA infection     Osteopenia     Osteoporosis 2015    Dr. Mtz / [facility]    Ovarian cyst     Pelvic pain     Routine gynecological examination     Skin cancer     right leg    Subdural  hematoma     Thyroid condition     Vitamin B12 deficiency     Vitamin D deficiency      Past Surgical History:  Past Surgical History:   Procedure Laterality Date    ABDOMINAL SURGERY  1987    AORTIC VALVE REPAIR/REPLACEMENT  2-4-2025    APPENDECTOMY      ARTERIAL ANEURYSM REPAIR  2-4-2025    CARDIAC CATHETERIZATION N/A 01/13/2025    Procedure: Left Heart Cath;  Surgeon: Blanche Velasco MD;  Location: [facility] CATH INVASIVE LOCATION;  Service: Cardiology;  Laterality: N/A;    CARDIAC CATHETERIZATION  1/13/2025    COLONOSCOPY      ESOPHAGUS SURGERY      HAND SURGERY Right 03/2020    HAND SURGERY Right     HYSTERECTOMY      OOPHORECTOMY      unilateral     OTHER SURGICAL HISTORY      SUBDURAL HEMATOMA REPAIR    SKIN CANCER EXCISION  08/01/2023    UPPER GASTROINTESTINAL ENDOSCOPY  2018    VAGOTOMY AND PYLOROPLASTY  1981     Social Hx:  Social History     Tobacco Use    Smoking status: Never    Smokeless tobacco: Never   Vaping Use    Vaping status: Never Used   Substance Use Topics    Alcohol use: Never    Drug use: Never     Family Hx:  Family History   Problem Relation Age of Onset    Depression Mother         Mom    Thyroid disease Mother     No Known Problems Father     Hypertension Sister         Taking medication    Arthritis Sister     Depression Sister     Anxiety disorder Sister     Hypertension Sister         Unknown    Thyroid disease Maternal Aunt     Anxiety disorder Daughter     Hypertension Sister     Anxiety disorder Sister     Thyroid disease Maternal Aunt     Breast cancer Neg Hx     Ovarian cancer Neg Hx     Uterine cancer Neg Hx     Colon cancer Neg Hx     Melanoma Neg Hx     Prostate cancer Neg Hx     Deep vein thrombosis Neg Hx     Pulmonary embolism Neg Hx     Coronary artery disease Neg Hx     Stroke Neg Hx     Osteoporosis Neg Hx     Diabetes Neg Hx      Review of Systems:        Review of Systems   Constitutional:  Negative for activity change, appetite change, chills, diaphoresis,  fatigue, fever and unexpected weight change.   HENT:  Negative for congestion, dental problem, drooling, ear discharge, ear pain, facial swelling, hearing loss, mouth sores, nosebleeds, postnasal drip, rhinorrhea, sinus pressure, sinus pain, sneezing, sore throat, tinnitus, trouble swallowing and voice change.    Eyes:  Negative for photophobia, pain, discharge, redness, itching and visual disturbance.   Respiratory:  Negative for apnea, cough, choking, chest tightness, shortness of breath, wheezing and stridor.    Cardiovascular:  Negative for chest pain, palpitations and leg swelling.   Gastrointestinal:  Negative for abdominal distention, abdominal pain, anal bleeding, blood in stool, constipation, diarrhea, nausea, rectal pain and vomiting.   Endocrine: Negative for cold intolerance, heat intolerance, polydipsia, polyphagia and polyuria.   Genitourinary:  Negative for decreased urine volume, difficulty urinating, dyspareunia, dysuria, enuresis, flank pain, frequency, genital sores, hematuria, menstrual problem, pelvic pain, urgency, vaginal bleeding, vaginal discharge and vaginal pain.   Musculoskeletal:  Negative for arthralgias, back pain, gait problem, joint swelling, myalgias, neck pain and neck stiffness.   Skin:  Negative for color change, pallor, rash and wound.   Allergic/Immunologic: Negative for environmental allergies, food allergies and immunocompromised state.   Neurological:  Positive for headaches. Negative for dizziness, tremors, seizures, syncope, facial asymmetry, speech difficulty, weakness, light-headedness and numbness.   Hematological:  Negative for adenopathy. Does not bruise/bleed easily.   Psychiatric/Behavioral:  Negative for agitation, behavioral problems, confusion, decreased concentration, dysphoric mood, hallucinations, self-injury, sleep disturbance and suicidal ideas. The patient is not nervous/anxious and is not hyperactive.           Vital Signs: Temp 97.3 °F (36.3 °C)  "(Infrared)   Ht 152.4 cm (60\")   Wt 45.7 kg (100 lb 12.8 oz)   BMI 19.69 kg/m²      Physical Exam  Awake, alert and oriented x 3  Speech f/c  Pupils 3 mm rx bilaterally  EOM intact bilaterally  Face symmetric bilaterally  5/5 in all 4 extremities  Gait normal  No pronator drift    Social History    Tobacco Use      Smoking status: Never      Smokeless tobacco: Never       Tobacco Use: Low Risk  (4/24/2025)    Patient History     Smoking Tobacco Use: Never     Smokeless Tobacco Use: Never     Passive Exposure: Not on file         STEADI Fall Risk Assessment has not been completed.      Diagnostic Studies: (All imaging is independently reviewed unless stated otherwise.)  Carotid duplex shows no evidence of hemodynamically significant stenosis      Assessment/Plan    Diagnoses and all orders for this visit:    1. Amaurosis fugax of left eye (Primary)  -     CT Angiogram Neck; Future         This is a 75-year-old female who presents for evaluation of amaurosis fugax in her left eye since February of this year.  She states it started after she had open heart surgery for aortic valve replacement.  She was referred here for further evaluation.  She is neurologically intact on exam with no evidence of focal deficit.  Carotid duplex shows no evidence of hemodynamically significant stenosis.  I reviewed case with Dr. Evans who recommends CTA of the neck for further evaluation.  We will have her follow-up in the office after imaging is completed.  recommend to continue following up with cardiologist for further workup. Patient encouraged to contact us if she has any changes in her condition or any concerns.    Any copied data from previous notes included in the (1) HPI, (2) PE, (3) MDM and/or Assessment and Plan has been reviewed and accurate as of 04/24/25.    Anayeli Stoner PA-C  04/24/25    "

## 2025-05-05 ENCOUNTER — TELEPHONE (OUTPATIENT)
Dept: CARDIOLOGY | Facility: CLINIC | Age: 75
End: 2025-05-05
Payer: MEDICARE

## 2025-05-05 NOTE — TELEPHONE ENCOUNTER
Patient left voice mail message.  She wants to know if she should continue the nebivolol.    LM for patient that she does need to continue nebivolol.  She is encouraged to call with any questions or concerns.

## 2025-05-08 ENCOUNTER — HOSPITAL ENCOUNTER (OUTPATIENT)
Dept: CT IMAGING | Facility: HOSPITAL | Age: 75
Discharge: HOME | End: 2025-05-08
Payer: MEDICARE

## 2025-05-08 ENCOUNTER — OFFICE VISIT (OUTPATIENT)
Dept: NEUROSURGERY | Facility: CLINIC | Age: 75
End: 2025-05-08
Payer: MEDICARE

## 2025-05-08 ENCOUNTER — TELEPHONE (OUTPATIENT)
Dept: CARDIOLOGY | Facility: CLINIC | Age: 75
End: 2025-05-08
Payer: MEDICARE

## 2025-05-08 VITALS — HEIGHT: 60 IN | TEMPERATURE: 97.7 F | WEIGHT: 101 LBS | BODY MASS INDEX: 19.83 KG/M2

## 2025-05-08 DIAGNOSIS — G45.3 AMAUROSIS FUGAX OF LEFT EYE: ICD-10-CM

## 2025-05-08 DIAGNOSIS — G45.3 AMAUROSIS FUGAX OF LEFT EYE: Primary | ICD-10-CM

## 2025-05-08 PROCEDURE — 70498 CT ANGIOGRAPHY NECK: CPT

## 2025-05-08 PROCEDURE — 25510000001 IOPAMIDOL PER 1 ML

## 2025-05-08 RX ORDER — IOPAMIDOL 755 MG/ML
100 INJECTION, SOLUTION INTRAVASCULAR
Status: COMPLETED | OUTPATIENT
Start: 2025-05-08 | End: 2025-05-08

## 2025-05-08 RX ORDER — IOPAMIDOL 755 MG/ML
100 INJECTION, SOLUTION INTRAVASCULAR
Status: DISCONTINUED | OUTPATIENT
Start: 2025-05-08 | End: 2025-05-08 | Stop reason: SDUPTHER

## 2025-05-08 RX ADMIN — IOPAMIDOL 75 ML: 755 INJECTION, SOLUTION INTRAVENOUS at 10:59

## 2025-05-08 NOTE — PROGRESS NOTES
Name: Fatmata Murdock    : 1950     MRN: [id number]     Primary Care Provider: Miesha Lobato APRN    Chief Complaint  Amaurosis fugax of left eye       History of Present Illness:  Fatmata Murdock is a 75 y.o. female who presents to the clinic today for intermittent episodes of vision changes in her left eye.  This developed after open heart surgery for aortic valve replacement in February of this year. The patient states she continues to have episodes in which her left sided vision will go dark that occurs for a couple seconds.  This happens 1-2 times a week.  She has had a carotid duplex which showed no evidence of hemodynamically significant stenosis.  She also had a AGUILAR which showed no findings that would correlate with her symptoms.  She denies any other neurological symptoms when her vision changes.  She brings CT of her neck to review    PMHX  Allergies:  Allergies   Allergen Reactions    Ceftriaxone Nausea And Vomiting, Hives and Other (See Comments)    Levofloxacin Swelling     Pruritus and vision change    Clopidogrel Bisulfate Myalgia    Codeine Itching    Latex Provider Review Needed     latex    Effexor Xr [Venlafaxine Hcl Er] Other (See Comments)     Restless legs, dermatitis     Hydrocodone Unknown (See Comments)    Penicillins GI Intolerance    Rifampin Other (See Comments) and Unknown (See Comments)     Elevated liver enzymes    Serotonin Reuptake Inhibitors (Ssris) Unknown (See Comments)     Irritable and mean    Sulfa Antibiotics GI Intolerance     Medications    Current Outpatient Medications:     acetaminophen (TYLENOL) 325 MG tablet, Take 2 tablets by mouth., Disp: , Rfl:     aspirin-acetaminophen-caffeine (EXCEDRIN MIGRAINE) 250-250-65 MG per tablet, Take 1 tablet by mouth Every 6 (Six) Hours As Needed for Headache., Disp: , Rfl:     buPROPion SR (WELLBUTRIN SR) 200 MG 12 hr tablet, Take 1 tablet by mouth. 300mg a day, Disp: , Rfl:     Cyanocobalamin (VITAMIN B-12 IJ), , Disp: ,  Rfl:     estradiol (VIVELLE-DOT) 0.1 MG/24HR patch, Place 1 patch on the skin as directed by provider 2 (Two) Times a Week., Disp: 24 patch, Rfl: 3    nebivolol (BYSTOLIC) 2.5 MG tablet, Take 0.5 tablets by mouth Daily., Disp: , Rfl:     Unable to find, 1 each Every 30 (Thirty) Days. Med Name: Immunoglobulin infusion, Disp: , Rfl:     aspirin 325 MG tablet, Take 1 tablet by mouth Daily. (Patient not taking: Reported on 5/8/2025), Disp: , Rfl:   No current facility-administered medications for this visit.  Past Medical History:  Past Medical History:   Diagnosis Date    Anemia May 1997    The anemia began due to ulcer.    Aneurysm 4/2022    Anxiety     Aortic regurgitation 10/06/2023    Arthritis June 2005    Brain concussion 2/2023    Cancer     Cervical disc disorder 2020    Spine    Depression     Endometriosis     Fibroid     Fibroids     LEIOMYOMA OF UTERUS    GERD (gastroesophageal reflux disease) 2021    Occasionally    H/O gastric ulcer     Heart valve disease 2021     diagnosed regeration from the aerotic valve    Hypothyroidism     Iron deficiency     Leukopenia     Migraine     MRSA (methicillin resistant Staphylococcus aureus) September 2014    No comments    MRSA infection     Osteopenia     Osteoporosis 2015    Dr. Mtz / [facility]    Ovarian cyst     Pelvic pain     Routine gynecological examination     Skin cancer     right leg    Subdural hematoma     Thyroid condition     Vitamin B12 deficiency     Vitamin D deficiency      Past Surgical History:  Past Surgical History:   Procedure Laterality Date    ABDOMINAL SURGERY  1987    AORTIC VALVE REPAIR/REPLACEMENT  2-4-2025    APPENDECTOMY      ARTERIAL ANEURYSM REPAIR  2-4-2025    CARDIAC CATHETERIZATION N/A 01/13/2025    Procedure: Left Heart Cath;  Surgeon: Blanche Velasco MD;  Location: [facility] CATH INVASIVE LOCATION;  Service: Cardiology;  Laterality: N/A;    CARDIAC CATHETERIZATION  1/13/2025    COLONOSCOPY      ESOPHAGUS SURGERY       HAND SURGERY Right 03/2020    HAND SURGERY Right     HYSTERECTOMY      OOPHORECTOMY      unilateral     OTHER SURGICAL HISTORY      SUBDURAL HEMATOMA REPAIR    SKIN CANCER EXCISION  08/01/2023    UPPER GASTROINTESTINAL ENDOSCOPY  2018    VAGOTOMY AND PYLOROPLASTY  1981     Social Hx:  Social History     Tobacco Use    Smoking status: Never    Smokeless tobacco: Never   Vaping Use    Vaping status: Never Used   Substance Use Topics    Alcohol use: Never    Drug use: Never     Family Hx:  Family History   Problem Relation Age of Onset    Depression Mother         Mom    Thyroid disease Mother     No Known Problems Father     Hypertension Sister         Taking medication    Arthritis Sister     Depression Sister     Anxiety disorder Sister     Hypertension Sister         Unknown    Thyroid disease Maternal Aunt     Anxiety disorder Daughter     Hypertension Sister     Anxiety disorder Sister     Thyroid disease Maternal Aunt     Breast cancer Neg Hx     Ovarian cancer Neg Hx     Uterine cancer Neg Hx     Colon cancer Neg Hx     Melanoma Neg Hx     Prostate cancer Neg Hx     Deep vein thrombosis Neg Hx     Pulmonary embolism Neg Hx     Coronary artery disease Neg Hx     Stroke Neg Hx     Osteoporosis Neg Hx     Diabetes Neg Hx      Review of Systems:        Review of Systems   Constitutional:  Negative for activity change, appetite change, chills, diaphoresis, fatigue, fever and unexpected weight change.   HENT:  Negative for congestion, dental problem, drooling, ear discharge, ear pain, facial swelling, hearing loss, mouth sores, nosebleeds, postnasal drip, rhinorrhea, sinus pressure, sinus pain, sneezing, sore throat, tinnitus, trouble swallowing and voice change.    Eyes:  Negative for photophobia, pain, discharge, redness, itching and visual disturbance.   Respiratory:  Negative for apnea, cough, choking, chest tightness, shortness of breath, wheezing and stridor.    Cardiovascular:  Negative for chest pain,  "palpitations and leg swelling.   Gastrointestinal:  Negative for abdominal distention, abdominal pain, anal bleeding, blood in stool, constipation, diarrhea, nausea, rectal pain and vomiting.   Endocrine: Negative for cold intolerance, heat intolerance, polydipsia, polyphagia and polyuria.   Genitourinary:  Negative for decreased urine volume, difficulty urinating, dyspareunia, dysuria, enuresis, flank pain, frequency, genital sores, hematuria, menstrual problem, pelvic pain, urgency, vaginal bleeding, vaginal discharge and vaginal pain.   Musculoskeletal:  Negative for arthralgias, back pain, gait problem, joint swelling, myalgias, neck pain and neck stiffness.   Skin:  Negative for color change, pallor, rash and wound.   Allergic/Immunologic: Negative for environmental allergies, food allergies and immunocompromised state.   Neurological:  Positive for dizziness, light-headedness and headaches. Negative for tremors, seizures, syncope, facial asymmetry, speech difficulty, weakness and numbness.   Hematological:  Negative for adenopathy. Does not bruise/bleed easily.   Psychiatric/Behavioral:  Negative for agitation, behavioral problems, confusion, decreased concentration, dysphoric mood, hallucinations, self-injury, sleep disturbance and suicidal ideas. The patient is not nervous/anxious and is not hyperactive.           Vital Signs: Temp 97.7 °F (36.5 °C) (Infrared)   Ht 152.4 cm (60\")   Wt 45.8 kg (101 lb)   BMI 19.73 kg/m²      Physical Exam  Awake, alert and oriented x 3  Speech f/c  Pupils 3 mm rx bilaterally  EOM intact bilaterally  Face symmetric bilaterally  5/5 in all 4 extremities  Gait normal  No pronator drift      Social History    Tobacco Use      Smoking status: Never      Smokeless tobacco: Never       Tobacco Use: Low Risk  (5/8/2025)    Patient History     Smoking Tobacco Use: Never     Smokeless Tobacco Use: Never     Passive Exposure: Not on file           STEADI Fall Risk Assessment was " completed, and patient is at LOW risk for falls.Assessment completed on:5/8/2025      Diagnostic Studies: (All imaging is independently reviewed unless stated otherwise.)  CT of the neck shows no evidence of stenosis within her carotid arteries      Assessment/Plan    Diagnoses and all orders for this visit:    1. Amaurosis fugax of left eye (Primary)         This is a 75-year-old female who presents for evaluation of intermittent left eye vision changes since February of this year after open heart surgery for aortic valve replacement.  She was referred here for further evaluation.  She is neurologically intact on exam with no focal deficit.  Carotid duplex shows no evidence of hemodynamically significant stenosis.  CTA of the neck was obtained today and reviewed with Dr. Evans which shows no evidence of significant plaque within her internal carotid arteries.  As such, there is no role for further neurointerventional treatment.  Would defer back to her referring provider.  Recommend she continue following up with her cardiologist. Patient encouraged to contact us if she has any changes in her condition or any concerns.    Any copied data from previous notes included in the (1) HPI, (2) PE, (3) MDM and/or Assessment and Plan has been reviewed and accurate as of 05/08/25.    Anayeli Stoner PA-C  05/08/25

## 2025-05-08 NOTE — TELEPHONE ENCOUNTER
Patient called to let us know she had her CT of her neck done and wondered what the next steps were. Patient aware that Dr. Velasco will review this scan and we will call back with any suggestions. Patient verbalizes understanding.

## 2025-05-12 ENCOUNTER — OFFICE VISIT (OUTPATIENT)
Age: 75
End: 2025-05-12
Payer: MEDICARE

## 2025-05-12 VITALS
BODY MASS INDEX: 19.83 KG/M2 | DIASTOLIC BLOOD PRESSURE: 78 MMHG | HEART RATE: 67 BPM | HEIGHT: 60 IN | WEIGHT: 101 LBS | SYSTOLIC BLOOD PRESSURE: 134 MMHG | OXYGEN SATURATION: 98 %

## 2025-05-12 DIAGNOSIS — E03.9 ACQUIRED HYPOTHYROIDISM: Primary | ICD-10-CM

## 2025-05-12 LAB — TSH SERPL DL<=0.05 MIU/L-ACNC: 0.55 UIU/ML (ref 0.27–4.2)

## 2025-05-12 PROCEDURE — 36415 COLL VENOUS BLD VENIPUNCTURE: CPT | Performed by: INTERNAL MEDICINE

## 2025-05-12 PROCEDURE — 84443 ASSAY THYROID STIM HORMONE: CPT | Performed by: INTERNAL MEDICINE

## 2025-05-12 PROCEDURE — 99213 OFFICE O/P EST LOW 20 MIN: CPT | Performed by: INTERNAL MEDICINE

## 2025-05-12 PROCEDURE — 1159F MED LIST DOCD IN RCRD: CPT | Performed by: INTERNAL MEDICINE

## 2025-05-12 PROCEDURE — 1160F RVW MEDS BY RX/DR IN RCRD: CPT | Performed by: INTERNAL MEDICINE

## 2025-05-12 RX ORDER — LEVOTHYROXINE SODIUM 112 UG/1
112 TABLET ORAL
COMMUNITY

## 2025-05-12 NOTE — PROGRESS NOTES
"     Office Note      Date: 2025  Patient Name: Fatmata Murdock  MRN: [id number]  : 1950    Chief Complaint   Patient presents with    Hypothyroidism       History of Present Illness:   Fatmata Murdock is a 75 y.o. female who presents for Hypothyroidism  .   Current rx: synthroid 112/ she gets it through patient assistance from the company     Changes in history: had open heart surgery in February.   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       The following portions of the patient's history were reviewed and updated as appropriate: allergies, current medications, past family history, past medical history, past social history, past surgical history, and problem list.    Objective     Visit Vitals  /78 (BP Location: Left arm, Patient Position: Sitting, Cuff Size: Adult)   Pulse 67   Ht 152.4 cm (60\")   Wt 45.8 kg (101 lb)   SpO2 98%   BMI 19.73 kg/m²           Physical Exam:  Physical Exam  Vitals reviewed.   Constitutional:       Appearance: Normal appearance.   Neck:      Comments: No palpable thyroid remnant  Lymphadenopathy:      Cervical: No cervical adenopathy.   Neurological:      Mental Status: She is alert.   Psychiatric:         Mood and Affect: Mood normal.         Behavior: Behavior normal.         Thought Content: Thought content normal.         Judgment: Judgment normal.         Assessment / Plan      Assessment & Plan:  Problem List Items Addressed This Visit       Acquired hypothyroidism - Primary    Current Assessment & Plan   Clinically euthyroid. Thyroid levels ordered. Medication to be adjusted accordingly.          Relevant Medications    nebivolol (BYSTOLIC) 2.5 MG tablet    levothyroxine (SYNTHROID, LEVOTHROID) 112 MCG tablet    Other Relevant Orders    TSH        Electronically signed by : Hudson Maynard MD "   05/12/2025

## 2025-05-16 ENCOUNTER — TELEPHONE (OUTPATIENT)
Dept: CARDIOLOGY | Facility: CLINIC | Age: 75
End: 2025-05-16
Payer: MEDICARE

## 2025-05-16 NOTE — TELEPHONE ENCOUNTER
Patient left a voice mail concerning recent imaging of her neck.  She requested a return call.  LM for patient to return call.

## 2025-05-16 NOTE — TELEPHONE ENCOUNTER
Spoke with patient.  Images and report of CT angiogram of neck - everything is normal.  She is instructed to keep all follow up appointments and all medications as ordered.  Patient asks if it is OK for her to go back to work.  Letter sent on 4/21/25.  She states they never received it.  [facility]  [facility]   for them to call back with fax number.

## 2025-05-19 ENCOUNTER — TELEPHONE (OUTPATIENT)
Dept: NEUROSURGERY | Facility: CLINIC | Age: 75
End: 2025-05-19
Payer: MEDICARE

## 2025-05-19 NOTE — TELEPHONE ENCOUNTER
Caller: Fatmata Murdock    Relationship: Self    Best call back number: [phone]    What is the best time to reach you: ANYTIME     Who are you requesting to speak with (clinical staff, provider,  specific staff member): JERALD ARRIAZA    Do you know the name of the person who called: NA    What was the call regarding: PATIENT CALLED AND STATES THAT SHE HAS QUESTIONS ABOUT SOME LOCATIONS ON HER CTA OF THE NECK -SHE IS REQUESTING A CALL BACK ASAP TO SPEAK WITH JERALD ARRIAZA-THANK YOU    Is it okay if the provider responds through MyChart: NO

## 2025-05-27 ENCOUNTER — TRANSCRIBE ORDERS (OUTPATIENT)
Dept: ADMINISTRATIVE | Facility: HOSPITAL | Age: 75
End: 2025-05-27
Payer: MEDICARE

## 2025-05-27 DIAGNOSIS — Z12.31 VISIT FOR SCREENING MAMMOGRAM: Primary | ICD-10-CM

## 2025-05-27 NOTE — TELEPHONE ENCOUNTER
Spoke to pt - I did request her last ID note.    I was able to contact her and arrange CTA to be performed later this month and appt in clinic to review these results with our provider thereafter.    Patient is aware of these appts and no further questions at this time   That inpatient services furnished since the previous certification or recertification were, and continue to be required: for treatment that could reasonably be expected to improve the patient's condition; or, for diagnostic study;    That the hospital records show that the services furnished were: intensive treatment services; admission and related services necessary for diagnostic study or equivalent services;    That the patient continues to need, on a daily basis active inpatient treatment furnished directly by or requiring the supervision of inpatient psychiatric facility personnel. That inpatient services furnished since the previous certification or recertification were, and continue to be required: for treatment that could reasonably be expected to improve the patient's condition; or, for diagnostic study;    That the hospital records show that the services furnished were: intensive treatment services; admission and related services necessary for diagnostic study or equivalent services;    That the patient continues to need, on a daily basis active inpatient treatment furnished directly by or requiring the supervision of inpatient psychiatric facility personnel. That inpatient services furnished since the previous certification or recertification were, and continue to be required: for treatment that could reasonably be expected to improve the patient's condition; or, for diagnostic study;    That the hospital records show that the services furnished were: intensive treatment services; admission and related services necessary for diagnostic study or equivalent services;    That the patient continues to need, on a daily basis active inpatient treatment furnished directly by or requiring the supervision of inpatient psychiatric facility personnel. That inpatient services furnished since the previous certification or recertification were, and continue to be required: for treatment that could reasonably be expected to improve the patient's condition; or, for diagnostic study;    That the hospital records show that the services furnished were: intensive treatment services; admission and related services necessary for diagnostic study or equivalent services;    That the patient continues to need, on a daily basis active inpatient treatment furnished directly by or requiring the supervision of inpatient psychiatric facility personnel.

## 2025-06-05 ENCOUNTER — TELEPHONE (OUTPATIENT)
Dept: CARDIOLOGY | Facility: CLINIC | Age: 75
End: 2025-06-05
Payer: MEDICARE

## 2025-06-05 NOTE — TELEPHONE ENCOUNTER
Caller: Fatmata Murdock    Relationship to patient: Self    Best call back number: [phone]     Chief complaint:  BP FLUCTUATION     Type of visit:  FOLLOW UP     Requested date:  ASAP     Additional notes: PT IS WANTING TO SEE DR DUNLAP DUE TO HER BP FLUCTUATION.  PT WOULD RATHER DISCUSS THIS OVER MYCHART.     PT IS NEEDING A LIPID PANEL FOR CARDIAC REHAB.

## 2025-06-06 NOTE — TELEPHONE ENCOUNTER
"Spoke with patient.  She states her blood pressure is \"all over the place\".  She is very concerned about this.  All medications reviewed and patient is compliant.  She is currently driving to her infusion for her immune system.  She is encouraged to talk to her infusion doctor and to Dr. Segura.  She verbalizes understanding.  "

## 2025-06-26 ENCOUNTER — TELEPHONE (OUTPATIENT)
Dept: OBSTETRICS AND GYNECOLOGY | Facility: CLINIC | Age: 75
End: 2025-06-26
Payer: MEDICARE

## 2025-06-26 NOTE — TELEPHONE ENCOUNTER
Caller: Fatmata Murdock    Relationship: Self    Best call back number:   Telephone Information:   Mobile [phone]       What is the best time to reach you: ANY    Who are you requesting to speak with (clinical staff, provider,  specific staff member): CLINICAL        What was the call regarding: PT STATES SHE HAD OPEN HEART SURGERY IN FEB BREAST/CHEST IS STILL VERY TENDER AND SORE, STATES CHEST IS WIRED SHUT, UNSURE IF SHE WILL BE ABLE TO WITHSTAND A MAMMO, WANTS A CALLBACK TO DISCUSS

## 2025-06-26 NOTE — TELEPHONE ENCOUNTER
Called patient she was concerned about having a mammogram with post open heart surgery explained to patient that it was not priority and she has appt TC in 2 weeks she can discuss then as well as bladder prolapse. CHETNA.

## 2025-07-09 ENCOUNTER — TELEPHONE (OUTPATIENT)
Dept: OBSTETRICS AND GYNECOLOGY | Facility: CLINIC | Age: 75
End: 2025-07-09
Payer: MEDICARE

## 2025-07-09 NOTE — TELEPHONE ENCOUNTER
Refill request for Vivelle-dot. Aundrea pt. Last seen for pessary follow up on 7/23/24. She has called multiple times requesting appointments to discuss bladder prolapse surgery, that she has canceled. Message sent to Aundrea.

## 2025-07-09 NOTE — TELEPHONE ENCOUNTER
Pt called in to request refill for medication  estradiol (VIVELLE-DOT) 0.1 MG/24HR patch (07/25/2024)     Please send to pharmacy    [facility]   [address]  [place], [place]  [phone]

## 2025-07-10 DIAGNOSIS — Z79.890 HORMONE REPLACEMENT THERAPY (HRT): ICD-10-CM

## 2025-07-10 DIAGNOSIS — Z79.890 ENCOUNTER FOR MONITORING POSTMENOPAUSAL ESTROGEN REPLACEMENT THERAPY: ICD-10-CM

## 2025-07-10 DIAGNOSIS — Z51.81 ENCOUNTER FOR MONITORING POSTMENOPAUSAL ESTROGEN REPLACEMENT THERAPY: ICD-10-CM

## 2025-07-10 RX ORDER — ESTRADIOL 0.1 MG/D
1 FILM, EXTENDED RELEASE TRANSDERMAL 2 TIMES WEEKLY
Qty: 4 PATCH | Refills: 0 | Status: SHIPPED | OUTPATIENT
Start: 2025-07-10

## 2025-07-11 NOTE — TELEPHONE ENCOUNTER
Patient just had open heart surgery.  Need to please keep appt to discuss risks/benefits of continuing HRT.

## 2025-07-11 NOTE — TELEPHONE ENCOUNTER
Refill request for Vivelle-dot. Guillaume pt. Last seen for pessary follow up on 7/23/24. She has called multiple times requesting appointments to discuss bladder prolapse surgery, that she has canceled. Per Dr. Guillaume, due to pt having hx of open heart surgery, she needs be seen @ appointment to discuss risks of HRT. Pt has appointment scheduled next week and verbalized understanding.

## 2025-07-15 ENCOUNTER — TRANSCRIBE ORDERS (OUTPATIENT)
Dept: LAB | Facility: HOSPITAL | Age: 75
End: 2025-07-15
Payer: MEDICARE

## 2025-07-15 ENCOUNTER — OFFICE VISIT (OUTPATIENT)
Dept: OBSTETRICS AND GYNECOLOGY | Facility: CLINIC | Age: 75
End: 2025-07-15
Payer: MEDICARE

## 2025-07-15 ENCOUNTER — TELEPHONE (OUTPATIENT)
Dept: GASTROENTEROLOGY | Facility: CLINIC | Age: 75
End: 2025-07-15
Payer: MEDICARE

## 2025-07-15 ENCOUNTER — LAB (OUTPATIENT)
Dept: LAB | Facility: HOSPITAL | Age: 75
End: 2025-07-15
Payer: MEDICARE

## 2025-07-15 VITALS — DIASTOLIC BLOOD PRESSURE: 82 MMHG | BODY MASS INDEX: 19.14 KG/M2 | WEIGHT: 98 LBS | SYSTOLIC BLOOD PRESSURE: 124 MMHG

## 2025-07-15 DIAGNOSIS — Z78.0 MENOPAUSE: Primary | ICD-10-CM

## 2025-07-15 DIAGNOSIS — B99.9 DILATED CARDIOMYOPATHY SECONDARY TO INFECTION: Primary | ICD-10-CM

## 2025-07-15 DIAGNOSIS — B99.9 DILATED CARDIOMYOPATHY SECONDARY TO INFECTION: ICD-10-CM

## 2025-07-15 DIAGNOSIS — R92.8 OTHER ABNORMAL AND INCONCLUSIVE FINDINGS ON DIAGNOSTIC IMAGING OF BREAST: ICD-10-CM

## 2025-07-15 DIAGNOSIS — I43 DILATED CARDIOMYOPATHY SECONDARY TO INFECTION: ICD-10-CM

## 2025-07-15 DIAGNOSIS — D80.1 NONFAMILIAL HYPOGAMMAGLOBULINEMIA: ICD-10-CM

## 2025-07-15 DIAGNOSIS — N81.10 CYSTOCELE WITH RECTOCELE: ICD-10-CM

## 2025-07-15 DIAGNOSIS — Z86.14 PERSONAL HISTORY OF METHICILLIN RESISTANT STAPHYLOCOCCUS AUREUS: ICD-10-CM

## 2025-07-15 DIAGNOSIS — I43 DILATED CARDIOMYOPATHY SECONDARY TO INFECTION: Primary | ICD-10-CM

## 2025-07-15 DIAGNOSIS — N81.6 CYSTOCELE WITH RECTOCELE: ICD-10-CM

## 2025-07-15 LAB
ALBUMIN SERPL-MCNC: 3.9 G/DL (ref 3.5–5.2)
ALBUMIN/GLOB SERPL: 1.3 G/DL
ALP SERPL-CCNC: 65 U/L (ref 39–117)
ALT SERPL W P-5'-P-CCNC: 15 U/L (ref 1–33)
ANION GAP SERPL CALCULATED.3IONS-SCNC: 8.2 MMOL/L (ref 5–15)
AST SERPL-CCNC: 18 U/L (ref 1–32)
BASOPHILS # BLD AUTO: 0.04 10*3/MM3 (ref 0–0.2)
BASOPHILS NFR BLD AUTO: 0.7 % (ref 0–1.5)
BILIRUB SERPL-MCNC: 0.3 MG/DL (ref 0–1.2)
BUN SERPL-MCNC: 13.9 MG/DL (ref 8–23)
BUN/CREAT SERPL: 27.3 (ref 7–25)
CALCIUM SPEC-SCNC: 9 MG/DL (ref 8.6–10.5)
CHLORIDE SERPL-SCNC: 102 MMOL/L (ref 98–107)
CO2 SERPL-SCNC: 25.8 MMOL/L (ref 22–29)
CREAT SERPL-MCNC: 0.51 MG/DL (ref 0.57–1)
CRP SERPL-MCNC: 0.51 MG/DL (ref 0–0.5)
DEPRECATED RDW RBC AUTO: 50.6 FL (ref 37–54)
EGFRCR SERPLBLD CKD-EPI 2021: 98.1 ML/MIN/1.73
EOSINOPHIL # BLD AUTO: 0.09 10*3/MM3 (ref 0–0.4)
EOSINOPHIL NFR BLD AUTO: 1.7 % (ref 0.3–6.2)
ERYTHROCYTE [DISTWIDTH] IN BLOOD BY AUTOMATED COUNT: 13.8 % (ref 12.3–15.4)
ERYTHROCYTE [SEDIMENTATION RATE] IN BLOOD: 26 MM/HR (ref 0–30)
GLOBULIN UR ELPH-MCNC: 2.9 GM/DL
GLUCOSE SERPL-MCNC: 86 MG/DL (ref 65–99)
HCT VFR BLD AUTO: 45.2 % (ref 34–46.6)
HGB BLD-MCNC: 14.3 G/DL (ref 12–15.9)
IMM GRANULOCYTES # BLD AUTO: 0.02 10*3/MM3 (ref 0–0.05)
IMM GRANULOCYTES NFR BLD AUTO: 0.4 % (ref 0–0.5)
LYMPHOCYTES # BLD AUTO: 1.37 10*3/MM3 (ref 0.7–3.1)
LYMPHOCYTES NFR BLD AUTO: 25.5 % (ref 19.6–45.3)
MCH RBC QN AUTO: 31.1 PG (ref 26.6–33)
MCHC RBC AUTO-ENTMCNC: 31.6 G/DL (ref 31.5–35.7)
MCV RBC AUTO: 98.3 FL (ref 79–97)
MONOCYTES # BLD AUTO: 0.36 10*3/MM3 (ref 0.1–0.9)
MONOCYTES NFR BLD AUTO: 6.7 % (ref 5–12)
NEUTROPHILS NFR BLD AUTO: 3.49 10*3/MM3 (ref 1.7–7)
NEUTROPHILS NFR BLD AUTO: 65 % (ref 42.7–76)
NRBC BLD AUTO-RTO: 0 /100 WBC (ref 0–0.2)
PLATELET # BLD AUTO: 201 10*3/MM3 (ref 140–450)
PMV BLD AUTO: 12.1 FL (ref 6–12)
POTASSIUM SERPL-SCNC: 3.9 MMOL/L (ref 3.5–5.2)
PROT SERPL-MCNC: 6.8 G/DL (ref 6–8.5)
RBC # BLD AUTO: 4.6 10*6/MM3 (ref 3.77–5.28)
SODIUM SERPL-SCNC: 136 MMOL/L (ref 136–145)
WBC NRBC COR # BLD AUTO: 5.37 10*3/MM3 (ref 3.4–10.8)

## 2025-07-15 PROCEDURE — 85025 COMPLETE CBC W/AUTO DIFF WBC: CPT

## 2025-07-15 PROCEDURE — 80053 COMPREHEN METABOLIC PANEL: CPT

## 2025-07-15 PROCEDURE — 36415 COLL VENOUS BLD VENIPUNCTURE: CPT

## 2025-07-15 PROCEDURE — 85652 RBC SED RATE AUTOMATED: CPT

## 2025-07-15 PROCEDURE — 86140 C-REACTIVE PROTEIN: CPT

## 2025-07-15 RX ORDER — FEZOLINETANT 45 MG/1
45 TABLET, FILM COATED ORAL EVERY 24 HOURS
Qty: 30 TABLET | Refills: 11 | Status: SHIPPED | OUTPATIENT
Start: 2025-07-15

## 2025-07-15 NOTE — TELEPHONE ENCOUNTER
Dr Iraheta,    I spoke with Ms Murdock this morning. Patient received colonoscopy recall letter. Patient just had open heart surgery. She isn't in good health right now to have colonoscopy. I explained to her that colonoscopy is elective surgery and the cardiologist will let her know when she's able to have Colonoscopy procedure. Patient voiced understanding.

## 2025-07-15 NOTE — PROGRESS NOTES
Chief Complaint   Patient presents with    Gynecologic Exam       Subjective   HPI  Fatmata Murdock is a 75 y.o. female, . Her last LMP was No LMP recorded. Patient has had a hysterectomy.. who presents for follow up on HRT.  Pt was last seen on 24 for a pessary check. The patient has symptomatic prolapse that is poorly controlled with pessary. It was discussed that the patient's upcoming aneurysm surgery was far more critical than prolapse surgery. She was given referral to pelvic floor therapy and was to return after her surgery with Dr. Chirinos. Pt had an appt schedule to discuss prolapse on 24 that pt canceled. She called again wanting to schedule appointment to discuss prolapse and appointment was scheduled for 24. Pt also canceled that appointment. Pt sent refill request last week for vivelle-dot patch. Dr. Guillaume denied request due to risks related to recently undergoing open heart surgery. Dr. Guillaume stated that pt needs to be seen for appointment to discuss. Pt states that she cannot have the prolapse surgery but still wants to get her HRT. She also states that she is unable to have mammograms and would like an ultrasound order.     Is using tampons instead of pessary now.  Doesn't feel like she could keep pessary in place when she tried it.    Additional OB/GYN History       Last Completed Pap Smear    This patient has no relevant Health Maintenance data.           Last Completed Mammogram            Upcoming       MAMMOGRAM (Every 2 Years) Next due on 2025  Order placed for Mammo Screening Digital Tomosynthesis Bilateral With CAD by Ronna Murdock RegSched Rep    2024  Mammo Screening Digital Tomosynthesis Bilateral With CAD    2023  Mammo Screening Digital Tomosynthesis Bilateral With CAD    2022  Mammo Diagnostic Left With CAD    2021  Mammo Screening Digital Tomosynthesis Bilateral With CAD     Only the first 5 history  entries have been loaded, but more history exists.                          OB History          14    Para   1    Term   1            AB   13    Living             SAB   11    IAB   2    Ectopic        Molar        Multiple        Live Births                      Current Outpatient Medications:     estradiol (VIVELLE-DOT) 0.1 MG/24HR patch, Place 1 patch on the skin as directed by provider 2 (Two) Times a Week., Disp: 4 patch, Rfl: 0    aspirin-acetaminophen-caffeine (EXCEDRIN MIGRAINE) 250-250-65 MG per tablet, Take 1 tablet by mouth Every 6 (Six) Hours As Needed for Headache., Disp: , Rfl:     buPROPion SR (WELLBUTRIN SR) 200 MG 12 hr tablet, Take 1 tablet by mouth. 300mg a day, Disp: , Rfl:     Cyanocobalamin (VITAMIN B-12 IJ), , Disp: , Rfl:     Fezolinetant (Veozah) 45 MG tablet, Take 1 tablet by mouth Daily., Disp: 30 tablet, Rfl: 11    levothyroxine (SYNTHROID, LEVOTHROID) 112 MCG tablet, Take 1 tablet by mouth Every Morning., Disp: , Rfl:     nebivolol (BYSTOLIC) 2.5 MG tablet, Take 0.5 tablets by mouth Daily., Disp: , Rfl:     Unable to find, 1 each Every 30 (Thirty) Days. Med Name: Immunoglobulin infusion, Disp: , Rfl:      Past Medical History:   Diagnosis Date    Anemia May 1997    The anemia began due to ulcer.    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 Mtz / [facility]    Ovarian cyst     Pelvic pain      Routine gynecological examination     Skin cancer     right leg    Subdural hematoma     Thyroid condition     Vitamin B12 deficiency     Vitamin D deficiency         Past Surgical History:   Procedure Laterality Date    ABDOMINAL SURGERY  1987    AORTIC VALVE REPAIR/REPLACEMENT  2-4-2025    APPENDECTOMY      ARTERIAL ANEURYSM REPAIR  2-4-2025    CARDIAC CATHETERIZATION N/A 01/13/2025    Procedure: Left Heart Cath;  Surgeon: Blanche Velasco MD;  Location: [facility] CATH INVASIVE LOCATION;  Service: Cardiology;  Laterality: N/A;    CARDIAC CATHETERIZATION  1/13/2025    COLONOSCOPY      ESOPHAGUS SURGERY      HAND SURGERY Right 03/2020    HAND SURGERY Right     HYSTERECTOMY      OOPHORECTOMY      unilateral     OTHER SURGICAL HISTORY      SUBDURAL HEMATOMA REPAIR    SKIN CANCER EXCISION  08/01/2023    UPPER GASTROINTESTINAL ENDOSCOPY  2018    VAGOTOMY AND PYLOROPLASTY  1981       The additional following portions of the patient's history were reviewed and updated as appropriate: allergies, current medications, past family history, past medical history, past social history, past surgical history, and problem list.    Review of Systems   Constitutional: Negative.    HENT: Negative.     Eyes: Negative.    Respiratory: Negative.     Cardiovascular:         Hx of open heart surgery   Gastrointestinal: Negative.    Endocrine: Negative.    Genitourinary:         Pelvic prolapse   Musculoskeletal: Negative.    Skin: Negative.    Allergic/Immunologic: Negative.    Neurological: Negative.    Hematological: Negative.    Psychiatric/Behavioral: Negative.         I have reviewed and agree with the HPI, ROS, and historical information as entered above. Mary Guillaume MD      Objective   /82   Wt 44.5 kg (98 lb)   Breastfeeding No   BMI 19.14 kg/m²     Physical Exam    Physical Exam:  General:  well developed; well nourished  no acute distress  mentation appropriate   Abdomen: soft, non-tender; no masses  no  umbilical or inguinal hernias are present   Pelvis: Not performed.        Assessment & Plan     Assessment     Problem List Items Addressed This Visit       Cystocele with rectocele    Menopause - Primary    Relevant Orders    Comprehensive Metabolic Panel    Comprehensive Metabolic Panel    Comprehensive Metabolic Panel    Comprehensive Metabolic Panel     Other Visit Diagnoses         Other abnormal and inconclusive findings on diagnostic imaging of breast        Relevant Orders    US Breast Bilateral Complete            Plan     Discussed risks/benefits of HRT.  Shows report of normal lipid panel.  Discussed better safety profile with Veozah and it should work as well.  If not, or if unaffordable, can go back to [facility].    Declines exam today  Return in about 1 year (around 7/15/2026), or if symptoms worsen or fail to improve.        Mary Guillaume MD  07/15/2025

## 2025-08-08 ENCOUNTER — TELEPHONE (OUTPATIENT)
Dept: OBSTETRICS AND GYNECOLOGY | Facility: CLINIC | Age: 75
End: 2025-08-08
Payer: MEDICARE

## 2025-08-11 ENCOUNTER — TELEPHONE (OUTPATIENT)
Dept: OBSTETRICS AND GYNECOLOGY | Facility: CLINIC | Age: 75
End: 2025-08-11
Payer: MEDICARE

## 2025-08-12 ENCOUNTER — TELEPHONE (OUTPATIENT)
Dept: OBSTETRICS AND GYNECOLOGY | Facility: CLINIC | Age: 75
End: 2025-08-12
Payer: MEDICARE

## 2025-08-12 DIAGNOSIS — Z51.81 ENCOUNTER FOR MONITORING POSTMENOPAUSAL ESTROGEN REPLACEMENT THERAPY: ICD-10-CM

## 2025-08-12 DIAGNOSIS — Z79.890 ENCOUNTER FOR MONITORING POSTMENOPAUSAL ESTROGEN REPLACEMENT THERAPY: ICD-10-CM

## 2025-08-12 DIAGNOSIS — Z79.890 HORMONE REPLACEMENT THERAPY (HRT): ICD-10-CM

## 2025-08-12 DIAGNOSIS — N81.4 VAGINAL AND CERVICAL PROLAPSE: Primary | ICD-10-CM

## 2025-08-12 RX ORDER — ESTRADIOL 0.1 MG/D
1 FILM, EXTENDED RELEASE TRANSDERMAL 2 TIMES WEEKLY
Qty: 12 PATCH | Refills: 3 | Status: SHIPPED | OUTPATIENT
Start: 2025-08-14

## 2025-08-14 ENCOUNTER — TELEPHONE (OUTPATIENT)
Dept: OBSTETRICS AND GYNECOLOGY | Facility: CLINIC | Age: 75
End: 2025-08-14
Payer: MEDICARE

## 2025-08-16 DIAGNOSIS — R92.1 MAMMOGRAPHIC CALCIFICATION FOUND ON DIAGNOSTIC IMAGING OF BREAST: ICD-10-CM

## 2025-08-16 DIAGNOSIS — R92.2 INCONCLUSIVE MAMMOGRAM: ICD-10-CM

## 2025-08-16 DIAGNOSIS — Z12.31 BREAST CANCER SCREENING BY MAMMOGRAM: Primary | ICD-10-CM

## 2025-08-16 DIAGNOSIS — Z12.39 ENCOUNTER FOR BREAST CANCER SCREENING USING NON-MAMMOGRAM MODALITY: Primary | ICD-10-CM

## 2025-08-25 ENCOUNTER — TELEPHONE (OUTPATIENT)
Dept: OBSTETRICS AND GYNECOLOGY | Facility: CLINIC | Age: 75
End: 2025-08-25
Payer: MEDICARE

## 2025-08-28 ENCOUNTER — TELEPHONE (OUTPATIENT)
Dept: OBSTETRICS AND GYNECOLOGY | Facility: CLINIC | Age: 75
End: 2025-08-28
Payer: MEDICARE

## 2025-08-29 ENCOUNTER — HOSPITAL ENCOUNTER (OUTPATIENT)
Dept: INFUSION THERAPY | Facility: HOSPITAL | Age: 75
Discharge: HOME | End: 2025-08-29
Payer: MEDICARE

## 2025-08-29 VITALS
DIASTOLIC BLOOD PRESSURE: 75 MMHG | HEART RATE: 60 BPM | TEMPERATURE: 98.1 F | RESPIRATION RATE: 17 BRPM | OXYGEN SATURATION: 98 % | SYSTOLIC BLOOD PRESSURE: 120 MMHG

## 2025-08-29 DIAGNOSIS — S21.101A STERNAL WOUND INFECTION: Primary | ICD-10-CM

## 2025-08-29 DIAGNOSIS — L08.9 STERNAL WOUND INFECTION: Primary | ICD-10-CM

## 2025-08-29 PROCEDURE — 96365 THER/PROPH/DIAG IV INF INIT: CPT

## 2025-08-29 PROCEDURE — 25010000002 DAPTOMYCIN PER 1 MG: Performed by: INTERNAL MEDICINE

## 2025-08-29 RX ADMIN — DAPTOMYCIN 250 MG: 500 INJECTION, POWDER, LYOPHILIZED, FOR SOLUTION INTRAVENOUS at 11:39

## 2025-08-30 ENCOUNTER — HOSPITAL ENCOUNTER (OUTPATIENT)
Dept: INFUSION THERAPY | Facility: HOSPITAL | Age: 75
Discharge: HOME | End: 2025-08-30
Payer: MEDICARE

## 2025-08-30 DIAGNOSIS — S21.101A STERNAL WOUND INFECTION: Primary | ICD-10-CM

## 2025-08-30 DIAGNOSIS — L08.9 STERNAL WOUND INFECTION: Primary | ICD-10-CM

## 2025-08-30 PROCEDURE — 96365 THER/PROPH/DIAG IV INF INIT: CPT

## 2025-08-30 PROCEDURE — 25010000002 DAPTOMYCIN PER 1 MG: Performed by: INTERNAL MEDICINE

## 2025-08-30 RX ADMIN — DAPTOMYCIN 250 MG: 500 INJECTION, POWDER, LYOPHILIZED, FOR SOLUTION INTRAVENOUS at 09:30

## (undated) DEVICE — PK CATH CARD 10

## (undated) DEVICE — CATH DIAG EXPO M/ PK 6FR FL4/FR4 PIG 3PK

## (undated) DEVICE — PINNACLE INTRODUCER SHEATH: Brand: PINNACLE

## (undated) DEVICE — GW PERIPH VASC ADX J/TP SS .035 150CM 3MM

## (undated) DEVICE — CATH DIAG EXPO .056 FL3.5 6F 100CM

## (undated) DEVICE — ADULT, W/LG. BACK PAD, RADIOTRANSPARENT ELEMENT AND LEAD WIRE COMPATIBLE W/: Brand: DEFIBRILLATION ELECTRODES

## (undated) DEVICE — NDL PERC 1PRT THNWALL W/BASEPLT 18G 7CM

## (undated) DEVICE — LHK- LEFT HEART KIT BAPTIST: Brand: MEDLINE INDUSTRIES, INC.